# Patient Record
Sex: MALE | Race: WHITE | NOT HISPANIC OR LATINO | Employment: OTHER | URBAN - METROPOLITAN AREA
[De-identification: names, ages, dates, MRNs, and addresses within clinical notes are randomized per-mention and may not be internally consistent; named-entity substitution may affect disease eponyms.]

---

## 2017-01-03 ENCOUNTER — APPOINTMENT (OUTPATIENT)
Dept: PHYSICAL THERAPY | Age: 57
End: 2017-01-03
Payer: COMMERCIAL

## 2017-01-03 PROCEDURE — 97110 THERAPEUTIC EXERCISES: CPT

## 2017-01-03 PROCEDURE — 97140 MANUAL THERAPY 1/> REGIONS: CPT

## 2017-01-05 ENCOUNTER — APPOINTMENT (OUTPATIENT)
Dept: PHYSICAL THERAPY | Age: 57
End: 2017-01-05
Payer: COMMERCIAL

## 2017-01-06 ENCOUNTER — APPOINTMENT (OUTPATIENT)
Dept: PHYSICAL THERAPY | Age: 57
End: 2017-01-06
Payer: COMMERCIAL

## 2017-01-06 PROCEDURE — 97140 MANUAL THERAPY 1/> REGIONS: CPT

## 2017-01-06 PROCEDURE — 97110 THERAPEUTIC EXERCISES: CPT

## 2017-01-09 ENCOUNTER — APPOINTMENT (OUTPATIENT)
Dept: PHYSICAL THERAPY | Age: 57
End: 2017-01-09
Payer: COMMERCIAL

## 2017-01-10 ENCOUNTER — APPOINTMENT (OUTPATIENT)
Dept: PHYSICAL THERAPY | Age: 57
End: 2017-01-10
Payer: COMMERCIAL

## 2017-01-10 PROCEDURE — 97140 MANUAL THERAPY 1/> REGIONS: CPT

## 2017-01-10 PROCEDURE — 97110 THERAPEUTIC EXERCISES: CPT

## 2017-01-11 ENCOUNTER — APPOINTMENT (OUTPATIENT)
Dept: PHYSICAL THERAPY | Age: 57
End: 2017-01-11
Payer: COMMERCIAL

## 2017-01-13 ENCOUNTER — APPOINTMENT (OUTPATIENT)
Dept: PHYSICAL THERAPY | Age: 57
End: 2017-01-13
Payer: COMMERCIAL

## 2017-01-13 PROCEDURE — 97110 THERAPEUTIC EXERCISES: CPT

## 2017-01-13 PROCEDURE — 97140 MANUAL THERAPY 1/> REGIONS: CPT

## 2017-01-16 ENCOUNTER — APPOINTMENT (OUTPATIENT)
Dept: PHYSICAL THERAPY | Age: 57
End: 2017-01-16
Payer: COMMERCIAL

## 2017-01-17 ENCOUNTER — APPOINTMENT (OUTPATIENT)
Dept: PHYSICAL THERAPY | Age: 57
End: 2017-01-17
Payer: COMMERCIAL

## 2017-01-17 PROCEDURE — 97110 THERAPEUTIC EXERCISES: CPT

## 2017-01-17 PROCEDURE — 97140 MANUAL THERAPY 1/> REGIONS: CPT

## 2017-01-18 ENCOUNTER — APPOINTMENT (OUTPATIENT)
Dept: PHYSICAL THERAPY | Age: 57
End: 2017-01-18
Payer: COMMERCIAL

## 2017-01-20 ENCOUNTER — APPOINTMENT (OUTPATIENT)
Dept: PHYSICAL THERAPY | Age: 57
End: 2017-01-20
Payer: COMMERCIAL

## 2017-01-20 PROCEDURE — 97110 THERAPEUTIC EXERCISES: CPT

## 2017-01-20 PROCEDURE — 97140 MANUAL THERAPY 1/> REGIONS: CPT

## 2017-01-24 ENCOUNTER — GENERIC CONVERSION - ENCOUNTER (OUTPATIENT)
Dept: OTHER | Facility: OTHER | Age: 57
End: 2017-01-24

## 2017-01-24 ENCOUNTER — APPOINTMENT (OUTPATIENT)
Dept: PHYSICAL THERAPY | Age: 57
End: 2017-01-24
Payer: COMMERCIAL

## 2017-01-24 PROCEDURE — 97140 MANUAL THERAPY 1/> REGIONS: CPT

## 2017-01-24 PROCEDURE — 97110 THERAPEUTIC EXERCISES: CPT

## 2017-01-27 ENCOUNTER — APPOINTMENT (OUTPATIENT)
Dept: PHYSICAL THERAPY | Age: 57
End: 2017-01-27
Payer: COMMERCIAL

## 2017-01-27 PROCEDURE — 97110 THERAPEUTIC EXERCISES: CPT

## 2017-01-27 PROCEDURE — 97140 MANUAL THERAPY 1/> REGIONS: CPT

## 2017-01-31 ENCOUNTER — APPOINTMENT (OUTPATIENT)
Dept: PHYSICAL THERAPY | Age: 57
End: 2017-01-31
Payer: COMMERCIAL

## 2017-01-31 ENCOUNTER — ALLSCRIPTS OFFICE VISIT (OUTPATIENT)
Dept: OTHER | Facility: OTHER | Age: 57
End: 2017-01-31

## 2017-01-31 PROCEDURE — 97140 MANUAL THERAPY 1/> REGIONS: CPT

## 2017-01-31 PROCEDURE — 97110 THERAPEUTIC EXERCISES: CPT

## 2017-02-03 ENCOUNTER — APPOINTMENT (OUTPATIENT)
Dept: PHYSICAL THERAPY | Age: 57
End: 2017-02-03
Payer: COMMERCIAL

## 2017-02-03 PROCEDURE — 97110 THERAPEUTIC EXERCISES: CPT

## 2017-02-03 PROCEDURE — 97140 MANUAL THERAPY 1/> REGIONS: CPT

## 2017-02-06 ENCOUNTER — APPOINTMENT (OUTPATIENT)
Dept: PHYSICAL THERAPY | Age: 57
End: 2017-02-06
Payer: COMMERCIAL

## 2017-02-07 ENCOUNTER — APPOINTMENT (OUTPATIENT)
Dept: PHYSICAL THERAPY | Age: 57
End: 2017-02-07
Payer: COMMERCIAL

## 2017-02-10 ENCOUNTER — APPOINTMENT (OUTPATIENT)
Dept: PHYSICAL THERAPY | Age: 57
End: 2017-02-10
Payer: COMMERCIAL

## 2017-02-10 PROCEDURE — 97140 MANUAL THERAPY 1/> REGIONS: CPT

## 2017-02-10 PROCEDURE — 97110 THERAPEUTIC EXERCISES: CPT

## 2017-02-14 ENCOUNTER — APPOINTMENT (OUTPATIENT)
Dept: PHYSICAL THERAPY | Age: 57
End: 2017-02-14
Payer: COMMERCIAL

## 2017-02-16 ENCOUNTER — GENERIC CONVERSION - ENCOUNTER (OUTPATIENT)
Dept: OTHER | Facility: OTHER | Age: 57
End: 2017-02-16

## 2017-02-17 ENCOUNTER — APPOINTMENT (OUTPATIENT)
Dept: PHYSICAL THERAPY | Age: 57
End: 2017-02-17
Payer: COMMERCIAL

## 2017-02-17 PROCEDURE — 97140 MANUAL THERAPY 1/> REGIONS: CPT

## 2017-02-17 PROCEDURE — 97110 THERAPEUTIC EXERCISES: CPT

## 2017-02-21 ENCOUNTER — APPOINTMENT (OUTPATIENT)
Dept: PHYSICAL THERAPY | Age: 57
End: 2017-02-21
Payer: COMMERCIAL

## 2017-02-21 PROCEDURE — 97110 THERAPEUTIC EXERCISES: CPT

## 2017-02-21 PROCEDURE — 97140 MANUAL THERAPY 1/> REGIONS: CPT

## 2017-02-24 ENCOUNTER — APPOINTMENT (OUTPATIENT)
Dept: PHYSICAL THERAPY | Age: 57
End: 2017-02-24
Payer: COMMERCIAL

## 2017-02-24 PROCEDURE — 97140 MANUAL THERAPY 1/> REGIONS: CPT

## 2017-02-24 PROCEDURE — 97110 THERAPEUTIC EXERCISES: CPT

## 2017-02-27 ENCOUNTER — APPOINTMENT (OUTPATIENT)
Dept: PHYSICAL THERAPY | Age: 57
End: 2017-02-27
Payer: COMMERCIAL

## 2017-03-01 ENCOUNTER — GENERIC CONVERSION - ENCOUNTER (OUTPATIENT)
Dept: OTHER | Facility: OTHER | Age: 57
End: 2017-03-01

## 2017-03-01 ENCOUNTER — APPOINTMENT (OUTPATIENT)
Dept: PHYSICAL THERAPY | Age: 57
End: 2017-03-01
Payer: COMMERCIAL

## 2017-03-01 PROCEDURE — 97140 MANUAL THERAPY 1/> REGIONS: CPT

## 2017-03-01 PROCEDURE — 97110 THERAPEUTIC EXERCISES: CPT

## 2017-03-15 ENCOUNTER — HOSPITAL ENCOUNTER (OUTPATIENT)
Dept: RADIOLOGY | Facility: HOSPITAL | Age: 57
Discharge: HOME | End: 2017-03-15
Attending: ORTHOPAEDIC SURGERY
Payer: COMMERCIAL

## 2017-03-15 ENCOUNTER — ALLSCRIPTS OFFICE VISIT (OUTPATIENT)
Dept: OTHER | Facility: OTHER | Age: 57
End: 2017-03-15

## 2017-03-15 DIAGNOSIS — Z47.1 AFTERCARE FOLLOWING JOINT REPLACEMENT SURGERY: ICD-10-CM

## 2017-03-15 PROCEDURE — 73560 X-RAY EXAM OF KNEE 1 OR 2: CPT

## 2017-10-26 ENCOUNTER — HOSPITAL ENCOUNTER (EMERGENCY)
Facility: HOSPITAL | Age: 57
Discharge: HOME | End: 2017-10-26
Attending: EMERGENCY MEDICINE
Payer: COMMERCIAL

## 2017-10-26 ENCOUNTER — APPOINTMENT (EMERGENCY)
Dept: RADIOLOGY | Facility: HOSPITAL | Age: 57
End: 2017-10-26
Payer: COMMERCIAL

## 2017-10-26 VITALS
OXYGEN SATURATION: 98 % | WEIGHT: 249.3 LBS | SYSTOLIC BLOOD PRESSURE: 143 MMHG | HEART RATE: 69 BPM | DIASTOLIC BLOOD PRESSURE: 74 MMHG | BODY MASS INDEX: 33.81 KG/M2 | TEMPERATURE: 97.7 F | RESPIRATION RATE: 16 BRPM

## 2017-10-26 DIAGNOSIS — K21.9 GERD (GASTROESOPHAGEAL REFLUX DISEASE): Primary | ICD-10-CM

## 2017-10-26 LAB
ANION GAP SERPL CALCULATED.3IONS-SCNC: 10 MMOL/L (ref 4–13)
APTT PPP: 25 SECONDS (ref 23–35)
ATRIAL RATE: 66 BPM
BASOPHILS # BLD AUTO: 0.08 THOUSANDS/ΜL (ref 0–0.1)
BASOPHILS NFR BLD AUTO: 1 % (ref 0–1)
BUN SERPL-MCNC: 18 MG/DL (ref 5–25)
CALCIUM SERPL-MCNC: 8.8 MG/DL (ref 8.3–10.1)
CHLORIDE SERPL-SCNC: 102 MMOL/L (ref 100–108)
CO2 SERPL-SCNC: 26 MMOL/L (ref 21–32)
CREAT SERPL-MCNC: 0.83 MG/DL (ref 0.6–1.3)
EOSINOPHIL # BLD AUTO: 0.16 THOUSAND/ΜL (ref 0–0.61)
EOSINOPHIL NFR BLD AUTO: 2 % (ref 0–6)
ERYTHROCYTE [DISTWIDTH] IN BLOOD BY AUTOMATED COUNT: 13.2 % (ref 11.6–15.1)
GFR SERPL CREATININE-BSD FRML MDRD: 98 ML/MIN/1.73SQ M
GLUCOSE SERPL-MCNC: 118 MG/DL (ref 65–140)
HCT VFR BLD AUTO: 43.4 % (ref 36.5–49.3)
HGB BLD-MCNC: 15.4 G/DL (ref 12–17)
INR PPP: 0.9 (ref 0.86–1.16)
LYMPHOCYTES # BLD AUTO: 1.55 THOUSANDS/ΜL (ref 0.6–4.47)
LYMPHOCYTES NFR BLD AUTO: 21 % (ref 14–44)
MCH RBC QN AUTO: 31.3 PG (ref 26.8–34.3)
MCHC RBC AUTO-ENTMCNC: 35.5 G/DL (ref 31.4–37.4)
MCV RBC AUTO: 88 FL (ref 82–98)
MONOCYTES # BLD AUTO: 0.77 THOUSAND/ΜL (ref 0.17–1.22)
MONOCYTES NFR BLD AUTO: 10 % (ref 4–12)
NEUTROPHILS # BLD AUTO: 4.96 THOUSANDS/ΜL (ref 1.85–7.62)
NEUTS SEG NFR BLD AUTO: 66 % (ref 43–75)
P AXIS: 64 DEGREES
PLATELET # BLD AUTO: 214 THOUSANDS/UL (ref 149–390)
PMV BLD AUTO: 10.8 FL (ref 8.9–12.7)
POTASSIUM SERPL-SCNC: 3.5 MMOL/L (ref 3.5–5.3)
PR INTERVAL: 174 MS
PROTHROMBIN TIME: 12.4 SECONDS (ref 12.1–14.4)
QRS AXIS: 18 DEGREES
QRSD INTERVAL: 96 MS
QT INTERVAL: 402 MS
QTC INTERVAL: 421 MS
RBC # BLD AUTO: 4.92 MILLION/UL (ref 3.88–5.62)
SODIUM SERPL-SCNC: 138 MMOL/L (ref 136–145)
T WAVE AXIS: 57 DEGREES
TROPONIN I SERPL-MCNC: <0.02 NG/ML
VENTRICULAR RATE: 66 BPM
WBC # BLD AUTO: 7.52 THOUSAND/UL (ref 4.31–10.16)

## 2017-10-26 PROCEDURE — 85610 PROTHROMBIN TIME: CPT | Performed by: EMERGENCY MEDICINE

## 2017-10-26 PROCEDURE — 99285 EMERGENCY DEPT VISIT HI MDM: CPT

## 2017-10-26 PROCEDURE — 80048 BASIC METABOLIC PNL TOTAL CA: CPT | Performed by: EMERGENCY MEDICINE

## 2017-10-26 PROCEDURE — 71020 HB CHEST X-RAY 2VW FRONTAL&LATL: CPT

## 2017-10-26 PROCEDURE — 84484 ASSAY OF TROPONIN QUANT: CPT | Performed by: EMERGENCY MEDICINE

## 2017-10-26 PROCEDURE — 85730 THROMBOPLASTIN TIME PARTIAL: CPT | Performed by: EMERGENCY MEDICINE

## 2017-10-26 PROCEDURE — 85025 COMPLETE CBC W/AUTO DIFF WBC: CPT | Performed by: EMERGENCY MEDICINE

## 2017-10-26 PROCEDURE — 93005 ELECTROCARDIOGRAM TRACING: CPT | Performed by: EMERGENCY MEDICINE

## 2017-10-26 PROCEDURE — 93005 ELECTROCARDIOGRAM TRACING: CPT

## 2017-10-26 PROCEDURE — 36415 COLL VENOUS BLD VENIPUNCTURE: CPT | Performed by: EMERGENCY MEDICINE

## 2017-10-26 RX ORDER — MAGNESIUM HYDROXIDE/ALUMINUM HYDROXICE/SIMETHICONE 120; 1200; 1200 MG/30ML; MG/30ML; MG/30ML
30 SUSPENSION ORAL ONCE
Status: COMPLETED | OUTPATIENT
Start: 2017-10-26 | End: 2017-10-26

## 2017-10-26 RX ORDER — INDOMETHACIN 25 MG/1
25 CAPSULE ORAL AS NEEDED
COMMUNITY
Start: 2014-04-23 | End: 2019-10-03

## 2017-10-26 RX ADMIN — LIDOCAINE HYDROCHLORIDE 15 ML: 20 SOLUTION ORAL; TOPICAL at 10:52

## 2017-10-26 RX ADMIN — ALUMINUM HYDROXIDE, MAGNESIUM HYDROXIDE, AND SIMETHICONE 30 ML: 200; 200; 20 SUSPENSION ORAL at 10:52

## 2017-10-26 NOTE — ED PROVIDER NOTES
History  Chief Complaint   Patient presents with    Chest Pain     intermittent CP since monday evening, described as burning; does not radiate, not reproducible; not dizzy, no SOB; no other symptoms noted @this time     Patient referred to the emergency department by his primary care physician for evaluation of chest discomfort that he describes as a burning sensation consistent with his acid reflux for which she takes Prilosec  He states he has had this discomfort for the past 3 or 4 days  He denies sob jaw arm or neck pain  He denies back or belly pain  He denies fever chills cough nausea vomiting diarrhea  He denies rectal bleeding or melena  Prior to Admission Medications   Prescriptions Last Dose Informant Patient Reported? Taking? Multiple Vitamin (MULTIVITAMIN) capsule   Yes Yes   Sig: Take 1 capsule by mouth daily  acetaminophen (TYLENOL) 325 mg tablet   No Yes   Sig: Take 2 tablets by mouth every 6 (six) hours as needed for mild pain   amLODIPine (NORVASC) 5 mg tablet   Yes Yes   Sig: Take 5 mg by mouth daily  ascorbic acid (VITAMIN C) 500 mg tablet   Yes Yes   Sig: Take 500 mg by mouth 2 (two) times a day  b complex vitamins capsule   Yes Yes   Sig: Take 1 capsule by mouth daily  folic acid (FOLVITE) 1 mg tablet   Yes Yes   Sig: Take 1 mg by mouth daily  gabapentin (NEURONTIN) 100 mg capsule   Yes Yes   Sig: Take 100 mg by mouth daily in the early morning   indomethacin (INDOCIN) 25 mg capsule   Yes Yes   Sig: Take 25 mg by mouth as needed   omeprazole (PriLOSEC) 20 mg delayed release capsule   Yes Yes   Sig: Take 20 mg by mouth daily   pantoprazole (PROTONIX) 40 mg tablet   No No   Sig: Take 1 tablet by mouth daily in the early morning for 30 days   triamterene-hydrochlorothiazide (DYAZIDE) 37 5-25 mg per capsule   Yes Yes   Sig: Take 1 capsule by mouth every morning        Facility-Administered Medications: None       Past Medical History:   Diagnosis Date    Anxiety     Arthritis     Chronic GERD     Hypertension     Sleep apnea        Past Surgical History:   Procedure Laterality Date    BACK SURGERY      ELBOW SURGERY      JOINT REPLACEMENT      KNEE ARTHROSCOPY Left     [place] TOTAL HIP ARTHROPLASTY Right 1/12/2016    Procedure: ARTHROPLASTY HIP TOTAL ANTERIOR;  Surgeon: Lillie Benoit DO;  [facility]: [facility];  Service: Orthopedics    [place] TOTAL KNEE ARTHROPLASTY Left 12/12/2016    Procedure: ARTHROPLASTY KNEE TOTAL;  Surgeon: Darryl Graff MD;  Location: [facility];  Service: Orthopedics       Family History   Problem Relation Age of Onset    Hypertension Mother      I have reviewed and agree with the history as documented  Social History   Substance Use Topics    Smoking status: Light Tobacco Smoker    Smokeless tobacco: Never Used      Comment: social smoker    Alcohol use Yes      Comment: occasional        Review of Systems   Constitutional: Negative  Negative for activity change, appetite change, chills, diaphoresis, fatigue and fever  HENT: Negative  Negative for congestion, ear pain, rhinorrhea, sore throat, trouble swallowing and voice change  Eyes: Negative for photophobia and visual disturbance  Respiratory: Negative  Negative for cough, chest tightness, shortness of breath, wheezing and stridor  Cardiovascular: Positive for chest pain  Negative for palpitations and leg swelling  Gastrointestinal: Negative  Negative for abdominal pain, diarrhea, nausea and vomiting  Endocrine: Negative  Genitourinary: Negative  Negative for dysuria, hematuria and urgency  Musculoskeletal: Negative  Negative for back pain, neck pain and neck stiffness  Skin: Negative  Negative for rash and wound  Allergic/Immunologic: Negative  Neurological: Negative  Negative for dizziness, tremors, seizures, syncope, facial asymmetry, speech difficulty, weakness, light-headedness, numbness and headaches  Hematological: Negative    Does not bruise/bleed easily  Psychiatric/Behavioral: Negative  Physical Exam  ED Triage Vitals   Temperature Pulse Respirations Blood Pressure SpO2   10/26/17 0942 10/26/17 0930 10/26/17 1139 10/26/17 0930 10/26/17 0930   97 7 °F (36 5 °C) 69 16 163/85 96 %      Temp Source Heart Rate Source Patient Position - Orthostatic VS BP Location FiO2 (%)   10/26/17 0942 10/26/17 0930 10/26/17 0930 10/26/17 0930 --   Oral Monitor Lying Right arm       Pain Score       10/26/17 1139       No Pain           Orthostatic Vital Signs  Vitals:    10/26/17 0930 10/26/17 1130 10/26/17 1139   BP: 163/85 143/82 143/74   Pulse: 69 64 69   Patient Position - Orthostatic VS: Lying         Physical Exam   Constitutional: He is oriented to person, place, and time  He appears well-developed and well-nourished  HENT:   Head: Normocephalic and atraumatic  Right Ear: External ear normal    Left Ear: External ear normal    Mouth/Throat: Oropharynx is clear and moist    Eyes: Conjunctivae and EOM are normal  Pupils are equal, round, and reactive to light  Neck: Normal range of motion  Neck supple  Cardiovascular: Normal rate, regular rhythm, normal heart sounds and intact distal pulses  Pulmonary/Chest: Effort normal and breath sounds normal  No respiratory distress  He has no wheezes  He has no rales  He exhibits no tenderness  Abdominal: Soft  Bowel sounds are normal  He exhibits no distension and no mass  There is no tenderness  There is no rebound and no guarding  No hernia  No peritoneal signs   Musculoskeletal: Normal range of motion  He exhibits no edema, tenderness or deformity  Neurological: He is alert and oriented to person, place, and time  He has normal reflexes  He displays normal reflexes  No cranial nerve deficit or sensory deficit  He exhibits normal muscle tone  Coordination normal    Skin: Skin is warm and dry  No rash noted  No erythema  No pallor  Psychiatric: He has a normal mood and affect   His behavior is normal  Judgment and thought content normal    Nursing note and vitals reviewed  ED Medications  Medications   aluminum-magnesium hydroxide-simethicone (MYLANTA) 200-200-20 mg/5 mL oral suspension 30 mL (30 mL Oral Given 10/26/17 1052)   lidocaine viscous (XYLOCAINE) 2 % mucosal solution 15 mL (15 mL Swish & Spit Given 10/26/17 1052)       Diagnostic Studies  Results Reviewed     Procedure Component Value Units Date/Time    Troponin I [[id number]]  (Normal) Collected:  10/26/17 1034    Lab Status:  Final result Specimen:  Blood from Arm, Left Updated:  10/26/17 1106     Troponin I <0 02 ng/mL     Narrative:         [employer] Chemistry analyzer 99% cutoff is > 0 04 ng/mL in network labs    o cTnI 99% cutoff is useful only when applied to patients in the clinical setting of myocardial ischemia  o cTnI 99% cutoff should be interpreted in the context of clinical history, ECG findings and possibly cardiac imaging to establish correct diagnosis  o cTnI 99% cutoff may be suggestive but clearly not indicative of a coronary event without the clinical setting of myocardial ischemia  Protime-INR [[id number]]  (Normal) Collected:  10/26/17 0954    Lab Status:  Final result Specimen:  Blood from Arm, Left Updated:  10/26/17 1013     Protime 12 4 seconds      INR 0 90    APTT [[id number]]  (Normal) Collected:  10/26/17 0954    Lab Status:  Final result Specimen:  Blood from Arm, Left Updated:  10/26/17 1013     PTT 25 seconds     Narrative:          Therapeutic Heparin Range = 60-90 seconds    Basic metabolic panel [[id number]] Collected:  10/26/17 0954    Lab Status:  Final result Specimen:  Blood from Arm, Left Updated:  10/26/17 1011     Sodium 138 mmol/L      Potassium 3 5 mmol/L      Chloride 102 mmol/L      CO2 26 mmol/L      Anion Gap 10 mmol/L      BUN 18 mg/dL      Creatinine 0 83 mg/dL      Glucose 118 mg/dL      Calcium 8 8 mg/dL      eGFR 98 ml/min/1 73sq m     Narrative:         National Kidney Disease Education Program recommendations are as follows:  GFR calculation is accurate only with a steady state creatinine  Chronic Kidney disease less than 60 ml/min/1 73 sq  meters  Kidney failure less than 15 ml/min/1 73 sq  meters  CBC and differential [[id number]]  (Normal) Collected:  10/26/17 0954    Lab Status:  Final result Specimen:  Blood from Arm, Left Updated:  10/26/17 1002     WBC 7 52 Thousand/uL      RBC 4 92 Million/uL      Hemoglobin 15 4 g/dL      Hematocrit 43 4 %      MCV 88 fL      MCH 31 3 pg      MCHC 35 5 g/dL      RDW 13 2 %      MPV 10 8 fL      Platelets 510 Thousands/uL      Neutrophils Relative 66 %      Lymphocytes Relative 21 %      Monocytes Relative 10 %      Eosinophils Relative 2 %      Basophils Relative 1 %      Neutrophils Absolute 4 96 Thousands/µL      Lymphocytes Absolute 1 55 Thousands/µL      Monocytes Absolute 0 77 Thousand/µL      Eosinophils Absolute 0 16 Thousand/µL      Basophils Absolute 0 08 Thousands/µL                  XR chest 2 views   Final Result by Tyler Tamez DO (10/26 1002)      No active pulmonary disease  Workstation performed: [id number]                    Procedures  ECG 12 Lead Documentation  Date/Time: 10/26/2017 9:56 AM  Performed by: Tim Trivedi  Authorized by: Tim Trivedi     ECG reviewed by me, the ED Provider: yes    Patient location:  ED  Previous ECG:     Previous ECG:  Compared to current    Similarity:  No change  Interpretation:     Interpretation: abnormal    Rate:     ECG rate:  66    ECG rate assessment: normal    Rhythm:     Rhythm: sinus rhythm    Ectopy:     Ectopy: none    QRS:     QRS axis:  Normal    QRS intervals:  Normal  Conduction:     Conduction: normal    ST segments:     ST segments:  Non-specific  T waves:     T waves: non-specific               Phone Contacts  ED Phone Contact    ED Course  ED Course as of Oct 26 1147   u Oct 26, 2017   1146 Patient with goodResponse to GI cocktail    Suspect this to be a reflux presentation  Advised patient to continue Prilosec and at times Maalox Mylanta to his regimen  Discussed signs and symptoms that would require return to the emergency department  MDM  CritCare Time    Disposition  Final diagnoses:   GERD (gastroesophageal reflux disease)     Time reflects when diagnosis was documented in both MDM as applicable and the Disposition within this note     Time User Action Codes Description Comment    10/26/2017 11:47 AM Rodrigo Green 56 [Y92 39] Injury occurred in gymnasium     10/26/2017 11:47 AM Arvind Green 70 [Y92 39] Injury occurred in gymnasium     10/26/2017 11:47 AM Oneyda Green Pro Add [K21 9] GERD (gastroesophageal reflux disease)       ED Disposition     ED Disposition Condition Comment    Discharge  Pleas Dates discharge to home/self care  Condition at discharge: Stable        Follow-up Information     Follow up With Specialties Details Why [facility] physician  Schedule an appointment as soon as possible for a visit          Patient's Medications   Discharge Prescriptions    No medications on file     No discharge procedures on file      ED Provider  Electronically Signed by           Andrea Tolbert MD  10/26/17 2524

## 2017-10-26 NOTE — DISCHARGE INSTRUCTIONS
Gastroesophageal Reflux Disease   WHAT YOU NEED TO KNOW:   What is gastroesophageal reflux disease? Gastroesophageal reflux occurs when acid and food in the stomach back up into the esophagus  Gastroesophageal reflux disease (GERD) is reflux that occurs more than twice a week for a few weeks  It usually causes heartburn and other symptoms  GERD can cause other health problems over time if it is not treated  What causes GERD? GERD often occurs when the lower muscle (sphincter) of the esophagus does not close properly  The sphincter normally opens to let food into the stomach  It then closes to keep food and stomach acid in the stomach  If the sphincter does not close properly, stomach acid and food back up (reflux) into the esophagus  The following may increase your risk for GERD:  · Certain foods such as spicy foods, chocolate, foods that contain caffeine, peppermint, and fried foods    · Hiatal hernia    · Certain medicines such as calcium channel blockers (used to treat high blood pressure), allergy medicines, sedatives, or antidepressants    · Pregnancy or obesity    · Lying down after a meal    · Drinking alcohol or smoking  What are the signs and symptoms of GERD? Heartburn is the most common symptom of GERD  You may feel burning pain in your chest or below the breast bone  This usually occurs after meals and spreads to your neck, jaw, or shoulder  The pain gets better when you change positions  You may also have any of the following:  · Bitter or acid taste in your mouth    · Dry cough    · Trouble swallowing or pain with swallowing    · Hoarseness or sore throat    · Frequent burping or hiccups    · Feeling of fullness soon after you start eating  How is GERD diagnosed? Your healthcare provider will ask about your symptoms and when they started  Tell him or her about other medical conditions you have, your eating habits, and your activities   You may also need any of the following:  · Esophageal pH monitoring  is used to place a small probe inside your esophagus and stomach to check the amount of acid  · An endoscopy  is a procedure used to look at the inside of your esophagus and stomach  An endoscope is a bendable tube with a light and camera on the end  Your healthcare provider may remove a small sample of tissue and send it to a lab for tests  · Upper GI x-rays  are done to take pictures of your stomach and intestines (bowel)  You may be given a chalky liquid to drink before the pictures are taken  This liquid helps your stomach and intestines show up better on the x-rays  · Esophageal manometry  is a test that shows how your esophagus pushes food and fluid to your stomach  It also shows the pressures in your esophagus and stomach  It may show a hiatal hernia  How is GERD treated? · Medicines  are used to decrease stomach acid  Medicine may also be used to help your lower esophageal sphincter and stomach contract (tighten) more  · Surgery  is done to wrap the upper part of the stomach around the esophageal sphincter  This will strengthen the sphincter and prevent reflux  How can I help manage GERD? · Do not have foods or drinks that may increase heartburn  These include chocolate, peppermint, fried or fatty foods, drinks that contain caffeine, or carbonated drinks (soda)  Other foods include spicy foods, onions, tomatoes, and tomato-based foods  Do not have foods or drinks that can irritate your esophagus, such as citrus fruits, juices, and alcohol  · Do not eat large meals  When you eat a lot of food at one time, your stomach needs more acid to digest it  Eat 6 small meals each day instead of 3 large ones, and eat slowly  Do not eat meals 2 to 3 hours before bedtime  · Elevate the head of your bed  Place 6-inch blocks under the head of your bed frame  You may also use more than one pillow under your head and shoulders while you sleep  · Maintain a healthy weight    If you are overweight, weight loss may help relieve symptoms of GERD  · Do not smoke  Smoking weakens the lower esophageal sphincter and increases the risk of GERD  Ask your healthcare provider for information if you currently smoke and need help to quit  E-cigarettes or smokeless tobacco still contain nicotine  Talk to your healthcare provider before you use these products  · Do not wear clothing that is tight around your waist   Tight clothing can put pressure on your stomach and cause or worsen GERD symptoms  When should I seek immediate care? · You feel full and cannot burp or vomit  · You have severe chest pain and sudden trouble breathing  · Your bowel movements are black, bloody, or tarry-looking  · Your vomit looks like coffee grounds or has blood in it  When should I contact my healthcare provider? · You vomit large amounts, or you vomit often  · You have trouble breathing after you vomit  · You have trouble swallowing, or pain with swallowing  · You are losing weight without trying  · Your symptoms get worse or do not improve with treatment  · You have questions or concerns about your condition or care  CARE AGREEMENT:   You have the right to help plan your care  Learn about your health condition and how it may be treated  Discuss treatment options with your caregivers to decide what care you want to receive  You always have the right to refuse treatment  The above information is an  only  It is not intended as medical advice for individual conditions or treatments  Talk to your doctor, nurse or pharmacist before following any medical regimen to see if it is safe and effective for you  © 2017 [employer] Information is for End User's use only and may not be sold, redistributed or otherwise used for commercial purposes   All illustrations and images included in CareNotes® are the copyrighted property of [employer] M [employer]  or [employer]

## 2018-01-10 NOTE — PROGRESS NOTES
Assessment    1  Aftercare following surgery of the musculoskeletal system (V58 78) (Z64 48)    Plan  Aftercare following surgery of the musculoskeletal system    · Follow-up visit in 5 weeks Evaluation and Treatment  Follow-up  Status: Complete  Done:  [id number]    Discussion/Summary    Patient seen by Dr Abbott Friday as well as myself today  The area looks well  We'll see him back in 4-5 weeks for repeat exam, no need for x-rays at that time  Chief Complaint    1  Hip Pain    Post-Op  HPI: Hanh Burrows presents to the office today for his second postoperative visit after undergoing a right anterior total hip arthroplasty  He is here for staple removal  He reports that his pain symptoms are improving  He is now walking with a walker and attending outpatient therapy  Review of Systems    Constitutional: No fever or chills, feels well, no tiredness, no recent weight loss or weight gain  Eyes: No complaints of red eyes, no eyesight problems  ENT: no complaints of loss of hearing, no nosebleeds, no sore throat  Cardiovascular: No complaints of chest pain, no palpitations, no leg claudication or lower extremity edema  Respiratory: No complaints of shortness of breath, no wheezing, no cough  Gastrointestinal: No complaints of abdominal pain, no constipation, no nausea or vomiting, no diarrhea or bloody stools  Genitourinary: No complaints of dysuria or incontinence, no hesitancy, no nocturia  Musculoskeletal: as noted in HPI  Integumentary: No complaints of skin rash or lesion, no itching or dry skin, no skin wounds  Neurological: No complaints of headache, no confusion, no numbness or tingling, no dizziness  Psychiatric: No suicidal thoughts, no anxiety, no depression  Endocrine: No muscle weakness, no frequent urination, no excessive thirst, no feelings of weakness  ROS reviewed  Active Problems    1  Achilles tendinitis of left lower extremity (219 36) (T73 58)   2   Aftercare following surgery of the musculoskeletal system (V58 78) (Z47 89)   3  Arthralgia Of The Left Pelvis/Hip/Femur (719 45)   4  Foraminal stenosis of lumbar region (724 02) (M99 83)   5  Foraminal stenosis of lumbosacral region (724 02) (M99 83)   6  Greater trochanteric bursitis of right hip (726 5) (M70 61)   7  Hip arthritis (716 95) (M19 90)   8  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)   9  Limb pain (729 5) (M79 609)   10  Lumbar disc herniation with radiculopathy (722 10) (M51 16)   11  Lumbar spondylosis (721 3) (M47 816)   12  Primary osteoarthritis of left knee (715 16) (M17 12)   13  SI (sacroiliac) joint dysfunction (724 6) (M53 3)    Social History    · Never A Smoker  The social history was reviewed and updated today  Current Meds   1  ALPRAZolam 0 25 MG Oral Tablet; Therapy: 69FNB7577 to (Evaluate:22Nov2014) Recorded   2  Clotrimazole-Betamethasone 1-0 05 % External Cream;   Therapy: 92YND1765 to (Evaluate:13Nov2014) Recorded   3  Cyclobenzaprine HCl - 10 MG Oral Tablet; Therapy: 96SDU1555 to (Evaluate:21Nov2014) Recorded   4  Gabapentin 100 MG Oral Capsule; TAKE 1 CAPSULE 3 TIMES DAILY; Therapy: 74VSQ7017 to ([id number])  Requested for: 04FSG1539; Last   Rx:03Nov2015 Ordered   5  Gabapentin 300 MG Oral Capsule; TAKE 1 CAPSULE 3 TIMES DAILY; Therapy: 04QXM6460 to (Evaluate:18Jan2014); Last Rx:98Hiy2339 Ordered   6  Indomethacin 25 MG Oral Capsule; take 1 capsule 2 to 3 times daily with food; Therapy: 62CJA9509 to (Evaluate:18Feb2016)  Requested for: 28DUH9406; Last   Rx:19Jan2016 Ordered   7  Levofloxacin 500 MG Oral Tablet; Therapy: 39RCQ6242 to (Evaluate:53Ktd6007) Recorded   8  Meloxicam 15 MG Oral Tablet; TAKE 1 TABLET DAILY WITH FOOD; Therapy: 27RYO7031 to (Tess Laird)  Requested for: 92Xel2380; Last   Rx:36Tjs4303 Ordered   9  Meloxicam 15 MG Oral Tablet; Take 1 tablet daily; Therapy: 58ROP8028 to (Evaluate:19Jan2016)  Requested for: 05WPG2389; Last   Rx:21Oct2015 Ordered   10  Methocarbamol 500 MG Oral Tablet; TAKE 1 TABLET Every 8 hours PRN muscle    spasms; Therapy: 24PTK8892 to (Jennifer Swain)  Requested for: 02ENK6418; Last    Rx:10Eks4528 Ordered   11  Naproxen Sodium 550 MG Oral Tablet; Therapy: 11MTM8914 to (Luis Bathrupal) Recorded   12  Norvasc TABS Recorded   13  Omeprazole 40 MG Oral Capsule Delayed Release; TAKE 1 CAPSULE DAILY; Therapy: 44NZP0805 to (Evaluate:27Pza1076)  Requested for: 81XPV5811; Last    Rx:03Nov2015 Ordered   14  Penicillin V Potassium 500 MG Oral Tablet; Therapy: 16YYV1180 to (Evaluate:07Nov2014) Recorded   15  PredniSONE 10 MG Oral Tablet; Therapy: 45YBG5316 to (Evaluate:16Nov2014) Recorded   16  Tobramycin-Dexamethasone 0 3-0 1 % Ophthalmic Suspension; Therapy: 08QOD0958 to (Evaluate:21Nov2014) Recorded   17  TraMADol HCl - 50 MG Oral Tablet; TAKE 1 TABLET EVERY 6 HOURS AS NEEDED FOR    PAIN;    Therapy: 63HMN7459 to (Evaluate:16Nov2014); Last Rx:06Nov2014 Ordered   18  TraMADol HCl - 50 MG Oral Tablet; TAKE 1 TO 2 TABLETS EVERY 6 HOURS AS    NEEDED FOR PAIN;    Therapy: 83SMN1559 to (Evaluate:03Txx6284)  Requested for: 46MGD5795; Last    Rx:97Scb5184 Ordered   19  TraMADol HCl - 50 MG Oral Tablet; TAKE 1 TO 2 TABLETS EVERY 6 HOURS AS    NEEDED FOR PAIN;    Therapy: 42FZQ9759 to (Evaluate:21Oct2015)  Requested for: 96GHZ2423; Last    Rx:09Oct2015 Ordered   20  TraMADol HCl - 50 MG Oral Tablet; TAKE 1 TO 2 TABLETS EVERY 6 HOURS AS    NEEDED FOR PAIN;    Therapy: 57CGF1825 to (Evaluate:08Jan2016); Last Rx:28Rln8516 Ordered   21  Triamterene-HCTZ 37 5-25 MG Oral Tablet; Therapy: 82Pxv0391 to (Evaluate:98Ppu1545) Recorded    The medication list was reviewed and updated today  Allergies    1   No Known Food Allergies    Vitals   Recorded: 68BKG8002 09:34AM   Heart Rate 96   Systolic 274   Diastolic 84   Weight 848 lb      Physical Exam   Upon examination of the right hip does demonstrate surgical incision is well-healing, the patient does have some swelling but the compartments are soft  No pain with range of motion        Future Appointments    Date/Time Provider Specialty Site   02/29/2016 08:30 AM Shannon Meyers DO Orthopedic Surgery [facility]     Signatures   Electronically signed by : Rissa Zhang, [facility]; Jan 25 2016 10:43AM EST                       (Author)    Electronically signed by : Chico Rivera DO; Jan 25 2016 10:54AM EST                       (Author)

## 2018-01-13 VITALS — DIASTOLIC BLOOD PRESSURE: 91 MMHG | HEART RATE: 86 BPM | SYSTOLIC BLOOD PRESSURE: 129 MMHG

## 2018-01-13 VITALS
WEIGHT: 250 LBS | SYSTOLIC BLOOD PRESSURE: 138 MMHG | BODY MASS INDEX: 33.91 KG/M2 | HEART RATE: 90 BPM | DIASTOLIC BLOOD PRESSURE: 80 MMHG

## 2018-01-25 ENCOUNTER — TELEPHONE (OUTPATIENT)
Dept: OBGYN CLINIC | Facility: HOSPITAL | Age: 58
End: 2018-01-25

## 2018-01-25 ENCOUNTER — DOCUMENTATION (OUTPATIENT)
Dept: OBGYN CLINIC | Facility: HOSPITAL | Age: 58
End: 2018-01-25

## 2018-01-25 NOTE — TELEPHONE ENCOUNTER
Pt  Called wanting to know if he has any restrictions, he would like to know if he can Ski  Please advise

## 2018-01-25 NOTE — TELEPHONE ENCOUNTER
He may ski,      He except the same risk is anybody a gets on the slope    You may inform the patient

## 2019-05-08 RX ORDER — PREDNISONE 10 MG/1
TABLET ORAL
COMMUNITY
Start: 2014-11-01 | End: 2019-10-03

## 2019-05-08 RX ORDER — PENICILLIN V POTASSIUM 500 MG/1
TABLET ORAL
COMMUNITY
Start: 2014-07-15 | End: 2019-10-03

## 2019-05-08 RX ORDER — TOBRAMYCIN AND DEXAMETHASONE 3; 1 MG/ML; MG/ML
SUSPENSION/ DROPS OPHTHALMIC
COMMUNITY
Start: 2014-10-10 | End: 2019-10-03

## 2019-05-08 RX ORDER — OMEPRAZOLE 40 MG/1
1 CAPSULE, DELAYED RELEASE ORAL
COMMUNITY
Start: 2014-10-10

## 2019-05-08 RX ORDER — CYCLOBENZAPRINE HCL 10 MG
TABLET ORAL
COMMUNITY
Start: 2014-10-31 | End: 2019-10-03

## 2019-05-08 RX ORDER — MULTIVIT-MIN/IRON/FOLIC ACID/K 18-600-40
CAPSULE ORAL
COMMUNITY
End: 2019-10-03

## 2019-05-08 RX ORDER — CLOTRIMAZOLE AND BETAMETHASONE DIPROPIONATE 10; .64 MG/G; MG/G
CREAM TOPICAL AS NEEDED
Status: ON HOLD | COMMUNITY
Start: 2014-10-01 | End: 2020-06-01 | Stop reason: ALTCHOICE

## 2019-05-08 RX ORDER — FERROUS SULFATE 325(65) MG
TABLET ORAL
COMMUNITY
End: 2019-10-03

## 2019-05-08 RX ORDER — ACETAMINOPHEN 325 MG/1
TABLET ORAL
COMMUNITY
End: 2019-05-09

## 2019-05-08 RX ORDER — GABAPENTIN 300 MG/1
1 CAPSULE ORAL 3 TIMES DAILY
COMMUNITY
Start: 2013-12-04 | End: 2019-10-03

## 2019-05-08 RX ORDER — DIAZEPAM 5 MG/1
1 TABLET ORAL EVERY 8 HOURS PRN
COMMUNITY
Start: 2016-02-15 | End: 2019-10-03

## 2019-05-08 RX ORDER — LEVOFLOXACIN 500 MG/1
TABLET, FILM COATED ORAL
COMMUNITY
Start: 2014-08-06 | End: 2019-10-03

## 2019-05-08 RX ORDER — ALPRAZOLAM 0.25 MG/1
TABLET ORAL AS NEEDED
COMMUNITY
Start: 2014-05-08

## 2019-05-09 ENCOUNTER — OFFICE VISIT (OUTPATIENT)
Dept: OBGYN CLINIC | Facility: HOSPITAL | Age: 59
End: 2019-05-09
Payer: COMMERCIAL

## 2019-05-09 ENCOUNTER — HOSPITAL ENCOUNTER (OUTPATIENT)
Dept: RADIOLOGY | Facility: HOSPITAL | Age: 59
Discharge: HOME | End: 2019-05-09
Attending: ORTHOPAEDIC SURGERY
Payer: COMMERCIAL

## 2019-05-09 VITALS
SYSTOLIC BLOOD PRESSURE: 165 MMHG | BODY MASS INDEX: 34.27 KG/M2 | HEART RATE: 89 BPM | WEIGHT: 253 LBS | HEIGHT: 72 IN | DIASTOLIC BLOOD PRESSURE: 87 MMHG

## 2019-05-09 DIAGNOSIS — M79.604 LEG PAIN, ANTERIOR, RIGHT: ICD-10-CM

## 2019-05-09 DIAGNOSIS — M70.61 TROCHANTERIC BURSITIS OF RIGHT HIP: ICD-10-CM

## 2019-05-09 DIAGNOSIS — M70.62 TROCHANTERIC BURSITIS OF LEFT HIP: ICD-10-CM

## 2019-05-09 DIAGNOSIS — Z98.890 HISTORY OF LUMBAR SURGERY: ICD-10-CM

## 2019-05-09 DIAGNOSIS — M25.561 RIGHT KNEE PAIN, UNSPECIFIED CHRONICITY: ICD-10-CM

## 2019-05-09 DIAGNOSIS — M25.561 RIGHT KNEE PAIN, UNSPECIFIED CHRONICITY: Primary | ICD-10-CM

## 2019-05-09 PROCEDURE — 20610 DRAIN/INJ JOINT/BURSA W/O US: CPT | Performed by: PHYSICIAN ASSISTANT

## 2019-05-09 PROCEDURE — 73562 X-RAY EXAM OF KNEE 3: CPT

## 2019-05-09 PROCEDURE — 99213 OFFICE O/P EST LOW 20 MIN: CPT | Performed by: ORTHOPAEDIC SURGERY

## 2019-05-09 RX ORDER — BETAMETHASONE SODIUM PHOSPHATE AND BETAMETHASONE ACETATE 3; 3 MG/ML; MG/ML
6 INJECTION, SUSPENSION INTRA-ARTICULAR; INTRALESIONAL; INTRAMUSCULAR; SOFT TISSUE
Status: COMPLETED | OUTPATIENT
Start: 2019-05-09 | End: 2019-05-09

## 2019-05-09 RX ORDER — LIDOCAINE HYDROCHLORIDE 10 MG/ML
2 INJECTION, SOLUTION INFILTRATION; PERINEURAL
Status: COMPLETED | OUTPATIENT
Start: 2019-05-09 | End: 2019-05-09

## 2019-05-09 RX ORDER — BETAMETHASONE SODIUM PHOSPHATE AND BETAMETHASONE ACETATE 3; 3 MG/ML; MG/ML
12 INJECTION, SUSPENSION INTRA-ARTICULAR; INTRALESIONAL; INTRAMUSCULAR; SOFT TISSUE
Status: COMPLETED | OUTPATIENT
Start: 2019-05-09 | End: 2019-05-09

## 2019-05-09 RX ORDER — BUPIVACAINE HYDROCHLORIDE 2.5 MG/ML
2 INJECTION, SOLUTION INFILTRATION; PERINEURAL
Status: COMPLETED | OUTPATIENT
Start: 2019-05-09 | End: 2019-05-09

## 2019-05-09 RX ADMIN — LIDOCAINE HYDROCHLORIDE 2 ML: 10 INJECTION, SOLUTION INFILTRATION; PERINEURAL at 15:04

## 2019-05-09 RX ADMIN — LIDOCAINE HYDROCHLORIDE 2 ML: 10 INJECTION, SOLUTION INFILTRATION; PERINEURAL at 15:03

## 2019-05-09 RX ADMIN — BETAMETHASONE SODIUM PHOSPHATE AND BETAMETHASONE ACETATE 6 MG: 3; 3 INJECTION, SUSPENSION INTRA-ARTICULAR; INTRALESIONAL; INTRAMUSCULAR; SOFT TISSUE at 15:03

## 2019-05-09 RX ADMIN — BETAMETHASONE SODIUM PHOSPHATE AND BETAMETHASONE ACETATE 6 MG: 3; 3 INJECTION, SUSPENSION INTRA-ARTICULAR; INTRALESIONAL; INTRAMUSCULAR; SOFT TISSUE at 15:04

## 2019-05-09 RX ADMIN — BUPIVACAINE HYDROCHLORIDE 2 ML: 2.5 INJECTION, SOLUTION INFILTRATION; PERINEURAL at 15:04

## 2019-05-09 RX ADMIN — BETAMETHASONE SODIUM PHOSPHATE AND BETAMETHASONE ACETATE 12 MG: 3; 3 INJECTION, SUSPENSION INTRA-ARTICULAR; INTRALESIONAL; INTRAMUSCULAR; SOFT TISSUE at 15:04

## 2019-10-03 ENCOUNTER — APPOINTMENT (OUTPATIENT)
Dept: RADIOLOGY | Facility: OTHER | Age: 59
End: 2019-10-03
Payer: COMMERCIAL

## 2019-10-03 ENCOUNTER — OFFICE VISIT (OUTPATIENT)
Dept: OBGYN CLINIC | Facility: OTHER | Age: 59
End: 2019-10-03
Payer: COMMERCIAL

## 2019-10-03 VITALS
HEIGHT: 72 IN | DIASTOLIC BLOOD PRESSURE: 72 MMHG | HEART RATE: 83 BPM | BODY MASS INDEX: 34.54 KG/M2 | WEIGHT: 255 LBS | SYSTOLIC BLOOD PRESSURE: 130 MMHG

## 2019-10-03 DIAGNOSIS — M25.522 PAIN IN LEFT ELBOW: ICD-10-CM

## 2019-10-03 DIAGNOSIS — M24.9 INTERNAL DERANGEMENT OF ELBOW: Primary | ICD-10-CM

## 2019-10-03 PROCEDURE — 99214 OFFICE O/P EST MOD 30 MIN: CPT | Performed by: ORTHOPAEDIC SURGERY

## 2019-10-03 PROCEDURE — 73080 X-RAY EXAM OF ELBOW: CPT

## 2019-10-03 NOTE — PROGRESS NOTES
Assessment  Diagnoses and all orders for this visit:    Internal derangement of elbow    Pain in left elbow        Discussion and Plan:    · Explained to the patient that he has a mechanism of injury and physical examination consistent with a possible distal biceps tendon rupture  Will need to further evaluate this diagnosis with an MRI of his left elbow for possible surgical planning  He was in agreement and wished to proceed  · May perform activities as tolerated  Avoid painful maneuvers  · He will follow up after MRI for discussion of results and further treatment options based on these results  Subjective:   Patient ID: Crispin Jean Baptiste is a 62 y o  male      61 y/o male who presents today for an initial evaluation for his left elbow  Patient states that on 09/19/2019 he was trimming hedges in his yard and felt a "pop" in his left elbow followed by immediate pain  Patient denies swelling or ecchymosis about the elbow  Today, he points to the cubital fossa as the main source of his pain  He describes the pain as a mild, intermittent "dull, aching" sensation  He states that his symptoms are worse with pulling or lifting objects  He has had no prior treatment for his left elbow  Denies numbness and tingling, fevers or chills  The following portions of the patient's history were reviewed and updated as appropriate: allergies, current medications, past family history, past medical history, past social history, past surgical history and problem list     Review of Systems   Constitutional: Negative for chills, fatigue, fever and unexpected weight change  HENT: Negative for hearing loss, nosebleeds and sore throat  Eyes: Negative for pain, redness and visual disturbance  Respiratory: Negative for cough, shortness of breath and wheezing  Cardiovascular: Negative for chest pain, palpitations and leg swelling  Gastrointestinal: Negative for abdominal pain, nausea and vomiting     Endocrine: Negative for polydipsia and polyuria  Genitourinary: Negative for frequency and urgency  Skin: Negative for color change, rash and wound  Neurological: Negative for dizziness, weakness, numbness and headaches  Psychiatric/Behavioral: Negative for behavioral problems, self-injury and suicidal ideas  Objective:  Left Elbow Exam     Tenderness   Left elbow tenderness location: cubital fossa  Range of Motion   The patient has normal left elbow ROM  Muscle Strength   Supination:  4/5     Tests   Varus: negative  Valgus: negative    Other   Erythema: absent  Sensation: normal  Pulse: present    Comments:  Equivocal Biceps Active Test              Physical Exam   Constitutional: He is oriented to person, place, and time  He appears well-developed and well-nourished  No distress  Eyes: Pupils are equal, round, and reactive to light  Conjunctivae are normal    Neck: Normal range of motion  Neck supple  Cardiovascular: Normal rate, regular rhythm and intact distal pulses  Pulmonary/Chest: Effort normal and breath sounds normal    Abdominal: Soft  Bowel sounds are normal    Neurological: He is alert and oriented to person, place, and time  He has normal reflexes  Skin: Skin is warm and dry  No rash noted  No erythema  Psychiatric: He has a normal mood and affect  His behavior is normal          I have personally reviewed pertinent films in PACS and my interpretation is as follows      X Ray Left Elbow: No acute osseous abonormality    Scribe Attestation    I,:   Juma Bates am acting as a scribe while in the presence of the attending physician :        I,:   Abimbola Awad MD personally performed the services described in this documentation    as scribed in my presence :

## 2019-10-16 ENCOUNTER — HOSPITAL ENCOUNTER (OUTPATIENT)
Dept: MRI IMAGING | Facility: HOSPITAL | Age: 59
Discharge: HOME | End: 2019-10-16
Attending: ORTHOPAEDIC SURGERY
Payer: COMMERCIAL

## 2019-10-16 DIAGNOSIS — M25.522 PAIN IN LEFT ELBOW: ICD-10-CM

## 2019-10-16 DIAGNOSIS — M24.9 INTERNAL DERANGEMENT OF ELBOW: ICD-10-CM

## 2019-10-16 PROCEDURE — 73221 MRI JOINT UPR EXTREM W/O DYE: CPT

## 2019-10-22 ENCOUNTER — OFFICE VISIT (OUTPATIENT)
Dept: OBGYN CLINIC | Facility: OTHER | Age: 59
End: 2019-10-22
Payer: COMMERCIAL

## 2019-10-22 VITALS
DIASTOLIC BLOOD PRESSURE: 70 MMHG | SYSTOLIC BLOOD PRESSURE: 160 MMHG | BODY MASS INDEX: 34.54 KG/M2 | HEIGHT: 72 IN | WEIGHT: 255 LBS | HEART RATE: 62 BPM

## 2019-10-22 DIAGNOSIS — S46.212A RUPTURE OF LEFT DISTAL BICEPS TENDON, INITIAL ENCOUNTER: Primary | ICD-10-CM

## 2019-10-22 PROCEDURE — 99214 OFFICE O/P EST MOD 30 MIN: CPT | Performed by: ORTHOPAEDIC SURGERY

## 2019-10-22 NOTE — PROGRESS NOTES
Assessment  Diagnoses and all orders for this visit:    Rupture of left distal biceps tendon, initial encounter        Discussion and Plan:    - The MRI of the left elbow shows a left distal biceps tear without significant retraction and an intact muscle belly  Is likely the lacertus fibrosus is tethering this and clearly it has not retracted over the last month since the injury  - operative and nonoperative options were discussed in detail and given the patient's requirement of full elbow flexion and supination strength it is recommended he consider distal biceps repair, he does wish to consider this and has been placed on the schedule for this but needs to arrange his work schedule and possibly time off to recover from the procedure  Informed consent was obtained after thorough discussion risks and benefits the procedure as well as alternatives to procedure and will be scheduled for left distal biceps repair      Subjective:   Patient ID: Zoie Moore is a 62 y o  male    Patient is here to discuss an MRI of the Left elbow to evaluate for a possible distal biceps tendon rupture  Patient states that on 09/19/2019 he was trimming hedges in his yard and felt a "pop" in his left elbow followed by immediate pain  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  The following portions of the patient's history were reviewed and updated as appropriate: allergies, current medications, past family history, past medical history, past social history, past surgical history and problem list     Review of Systems    Objective:  /70   Pulse 62   Ht 6' (1 829 m)   Wt 116 kg (255 lb)   BMI 34 58 kg/m²       Left Elbow Exam     Tenderness   Left elbow tenderness location: distal biceps       Muscle Strength   Pronation:  5/5   Supination:  4/5     Other   Erythema: absent  Sensation: normal  Pulse: present          Physical Exam   Constitutional: He is oriented to person, place, and time  He appears well-developed and well-nourished  HENT:   Head: Normocephalic and atraumatic  Neck: Normal range of motion  Neck supple  Cardiovascular: Normal rate, regular rhythm and normal heart sounds  Pulmonary/Chest: Effort normal and breath sounds normal  No respiratory distress  Abdominal: Soft  He exhibits no distension  Neurological: He is alert and oriented to person, place, and time  Skin: Skin is warm and dry  Psychiatric: He has a normal mood and affect  His behavior is normal    Nursing note and vitals reviewed  I have personally reviewed pertinent films in PACS and my interpretation is as follows  MRI of the LEFT elbow that shows: Complete rupture of the distal biceps tendon with 2 5cm retraction      Scribe Attestation    I,:   Russ Clement am acting as a scribe while in the presence of the attending physician :        I,:   Carmelo Medrano MD personally performed the services described in this documentation    as scribed in my presence :

## 2019-11-19 NOTE — PRE-PROCEDURE INSTRUCTIONS
Pre-Surgery Instructions:   Medication Instructions    ALPRAZolam (XANAX) 0 25 mg tablet Instructed patient per Anesthesia Guidelines   amLODIPine (NORVASC) 5 mg tablet Instructed patient per Anesthesia Guidelines   b complex vitamins capsule Instructed patient per Anesthesia Guidelines   clotrimazole-betamethasone (LOTRISONE) 1-0 05 % cream Instructed patient per Anesthesia Guidelines   Multiple Vitamin (MULTIVITAMIN) capsule Instructed patient per Anesthesia Guidelines   omeprazole (PriLOSEC) 40 MG capsule Instructed patient per Anesthesia Guidelines   triamterene-hydrochlorothiazide (DYAZIDE) 37 5-25 mg per capsule Instructed patient per Anesthesia Guidelines  Pre op, medications and showering instructions reviewed  Patient getting hibiclens-Patient does not have a sling

## 2019-11-25 ENCOUNTER — ANESTHESIA EVENT (OUTPATIENT)
Dept: PERIOP | Facility: AMBULARY SURGERY CENTER | Age: 59
End: 2019-11-25
Payer: COMMERCIAL

## 2019-12-04 ENCOUNTER — APPOINTMENT (OUTPATIENT)
Dept: RADIOLOGY | Facility: AMBULARY SURGERY CENTER | Age: 59
End: 2019-12-04
Payer: COMMERCIAL

## 2019-12-04 ENCOUNTER — ANESTHESIA (OUTPATIENT)
Dept: PERIOP | Facility: AMBULARY SURGERY CENTER | Age: 59
End: 2019-12-04
Payer: COMMERCIAL

## 2019-12-04 ENCOUNTER — HOSPITAL ENCOUNTER (OUTPATIENT)
Facility: AMBULARY SURGERY CENTER | Age: 59
Setting detail: OUTPATIENT SURGERY
Discharge: HOME | End: 2019-12-04
Attending: ORTHOPAEDIC SURGERY | Admitting: ORTHOPAEDIC SURGERY
Payer: COMMERCIAL

## 2019-12-04 VITALS
HEIGHT: 72 IN | WEIGHT: 250 LBS | HEART RATE: 76 BPM | DIASTOLIC BLOOD PRESSURE: 60 MMHG | OXYGEN SATURATION: 95 % | TEMPERATURE: 98 F | SYSTOLIC BLOOD PRESSURE: 122 MMHG | RESPIRATION RATE: 16 BRPM | BODY MASS INDEX: 33.86 KG/M2

## 2019-12-04 DIAGNOSIS — S46.212D RUPTURE OF LEFT DISTAL BICEPS TENDON, SUBSEQUENT ENCOUNTER: Primary | ICD-10-CM

## 2019-12-04 PROCEDURE — 99024 POSTOP FOLLOW-UP VISIT: CPT | Performed by: ORTHOPAEDIC SURGERY

## 2019-12-04 PROCEDURE — 73070 X-RAY EXAM OF ELBOW: CPT

## 2019-12-04 PROCEDURE — C9290 INJ, BUPIVACAINE LIPOSOME: HCPCS | Performed by: STUDENT IN AN ORGANIZED HEALTH CARE EDUCATION/TRAINING PROGRAM

## 2019-12-04 PROCEDURE — C1713 ANCHOR/SCREW BN/BN,TIS/BN: HCPCS | Performed by: ORTHOPAEDIC SURGERY

## 2019-12-04 PROCEDURE — 24342 REPAIR OF RUPTURED TENDON: CPT | Performed by: PHYSICIAN ASSISTANT

## 2019-12-04 PROCEDURE — 24342 REPAIR OF RUPTURED TENDON: CPT | Performed by: ORTHOPAEDIC SURGERY

## 2019-12-04 DEVICE — SYSTEM IMPLANT DSTL BICEP REPAIR: Type: IMPLANTABLE DEVICE | Site: ELBOW | Status: FUNCTIONAL

## 2019-12-04 RX ORDER — PROPOFOL 10 MG/ML
INJECTION, EMULSION INTRAVENOUS AS NEEDED
Status: DISCONTINUED | OUTPATIENT
Start: 2019-12-04 | End: 2019-12-04 | Stop reason: SURG

## 2019-12-04 RX ORDER — KETOROLAC TROMETHAMINE 30 MG/ML
INJECTION, SOLUTION INTRAMUSCULAR; INTRAVENOUS AS NEEDED
Status: DISCONTINUED | OUTPATIENT
Start: 2019-12-04 | End: 2019-12-04 | Stop reason: SURG

## 2019-12-04 RX ORDER — CEFAZOLIN SODIUM 2 G/50ML
2000 SOLUTION INTRAVENOUS ONCE
Status: COMPLETED | OUTPATIENT
Start: 2019-12-04 | End: 2019-12-04

## 2019-12-04 RX ORDER — HYDROCODONE BITARTRATE AND ACETAMINOPHEN 5; 325 MG/1; MG/1
1 TABLET ORAL EVERY 6 HOURS PRN
Status: DISCONTINUED | OUTPATIENT
Start: 2019-12-04 | End: 2019-12-04 | Stop reason: HOSPADM

## 2019-12-04 RX ORDER — ACETAMINOPHEN 325 MG/1
650 TABLET ORAL EVERY 6 HOURS PRN
Status: DISCONTINUED | OUTPATIENT
Start: 2019-12-04 | End: 2019-12-04 | Stop reason: HOSPADM

## 2019-12-04 RX ORDER — HYDROCODONE BITARTRATE AND ACETAMINOPHEN 5; 325 MG/1; MG/1
1 TABLET ORAL EVERY 6 HOURS PRN
Qty: 12 TABLET | Refills: 0 | Status: SHIPPED | OUTPATIENT
Start: 2019-12-04 | End: 2019-12-14

## 2019-12-04 RX ORDER — BUPIVACAINE HYDROCHLORIDE 5 MG/ML
INJECTION, SOLUTION PERINEURAL
Status: COMPLETED | OUTPATIENT
Start: 2019-12-04 | End: 2019-12-04

## 2019-12-04 RX ORDER — FENTANYL CITRATE 50 UG/ML
INJECTION, SOLUTION INTRAMUSCULAR; INTRAVENOUS
Status: COMPLETED | OUTPATIENT
Start: 2019-12-04 | End: 2019-12-04

## 2019-12-04 RX ORDER — HYDROMORPHONE HCL/PF 1 MG/ML
0.5 SYRINGE (ML) INJECTION
Status: DISCONTINUED | OUTPATIENT
Start: 2019-12-04 | End: 2019-12-04 | Stop reason: HOSPADM

## 2019-12-04 RX ORDER — ONDANSETRON 2 MG/ML
4 INJECTION INTRAMUSCULAR; INTRAVENOUS EVERY 6 HOURS PRN
Status: DISCONTINUED | OUTPATIENT
Start: 2019-12-04 | End: 2019-12-04 | Stop reason: HOSPADM

## 2019-12-04 RX ORDER — ONDANSETRON 2 MG/ML
4 INJECTION INTRAMUSCULAR; INTRAVENOUS ONCE AS NEEDED
Status: DISCONTINUED | OUTPATIENT
Start: 2019-12-04 | End: 2019-12-04 | Stop reason: HOSPADM

## 2019-12-04 RX ORDER — LIDOCAINE HYDROCHLORIDE 10 MG/ML
INJECTION, SOLUTION INFILTRATION; PERINEURAL AS NEEDED
Status: DISCONTINUED | OUTPATIENT
Start: 2019-12-04 | End: 2019-12-04 | Stop reason: SURG

## 2019-12-04 RX ORDER — GABAPENTIN 100 MG/1
100 CAPSULE ORAL 3 TIMES DAILY
COMMUNITY
End: 2020-08-26

## 2019-12-04 RX ORDER — SODIUM CHLORIDE, SODIUM LACTATE, POTASSIUM CHLORIDE, CALCIUM CHLORIDE 600; 310; 30; 20 MG/100ML; MG/100ML; MG/100ML; MG/100ML
125 INJECTION, SOLUTION INTRAVENOUS CONTINUOUS
Status: DISCONTINUED | OUTPATIENT
Start: 2019-12-04 | End: 2019-12-04 | Stop reason: HOSPADM

## 2019-12-04 RX ORDER — FENTANYL CITRATE/PF 50 MCG/ML
25 SYRINGE (ML) INJECTION
Status: COMPLETED | OUTPATIENT
Start: 2019-12-04 | End: 2019-12-04

## 2019-12-04 RX ORDER — MIDAZOLAM HYDROCHLORIDE 2 MG/2ML
INJECTION, SOLUTION INTRAMUSCULAR; INTRAVENOUS
Status: COMPLETED | OUTPATIENT
Start: 2019-12-04 | End: 2019-12-04

## 2019-12-04 RX ORDER — ONDANSETRON 2 MG/ML
INJECTION INTRAMUSCULAR; INTRAVENOUS AS NEEDED
Status: DISCONTINUED | OUTPATIENT
Start: 2019-12-04 | End: 2019-12-04 | Stop reason: SURG

## 2019-12-04 RX ORDER — DEXAMETHASONE SODIUM PHOSPHATE 10 MG/ML
INJECTION, SOLUTION INTRAMUSCULAR; INTRAVENOUS AS NEEDED
Status: DISCONTINUED | OUTPATIENT
Start: 2019-12-04 | End: 2019-12-04 | Stop reason: SURG

## 2019-12-04 RX ORDER — LIDOCAINE HYDROCHLORIDE 10 MG/ML
0.5 INJECTION, SOLUTION EPIDURAL; INFILTRATION; INTRACAUDAL; PERINEURAL ONCE AS NEEDED
Status: DISCONTINUED | OUTPATIENT
Start: 2019-12-04 | End: 2019-12-04 | Stop reason: HOSPADM

## 2019-12-04 RX ADMIN — FENTANYL CITRATE 25 MCG: 50 INJECTION, SOLUTION INTRAMUSCULAR; INTRAVENOUS at 09:55

## 2019-12-04 RX ADMIN — FENTANYL CITRATE 25 MCG: 50 INJECTION, SOLUTION INTRAMUSCULAR; INTRAVENOUS at 09:42

## 2019-12-04 RX ADMIN — FENTANYL CITRATE 25 MCG: 50 INJECTION, SOLUTION INTRAMUSCULAR; INTRAVENOUS at 10:59

## 2019-12-04 RX ADMIN — FENTANYL CITRATE 50 MCG: 50 INJECTION, SOLUTION INTRAMUSCULAR; INTRAVENOUS at 08:55

## 2019-12-04 RX ADMIN — MIDAZOLAM HYDROCHLORIDE 2 MG: 1 INJECTION, SOLUTION INTRAMUSCULAR; INTRAVENOUS at 08:55

## 2019-12-04 RX ADMIN — FENTANYL CITRATE 25 MCG: 50 INJECTION, SOLUTION INTRAMUSCULAR; INTRAVENOUS at 11:17

## 2019-12-04 RX ADMIN — FENTANYL CITRATE 25 MCG: 50 INJECTION, SOLUTION INTRAMUSCULAR; INTRAVENOUS at 10:16

## 2019-12-04 RX ADMIN — DEXAMETHASONE SODIUM PHOSPHATE 4 MG: 10 INJECTION, SOLUTION INTRAMUSCULAR; INTRAVENOUS at 09:22

## 2019-12-04 RX ADMIN — FENTANYL CITRATE 25 MCG: 50 INJECTION, SOLUTION INTRAMUSCULAR; INTRAVENOUS at 11:04

## 2019-12-04 RX ADMIN — KETOROLAC TROMETHAMINE 15 MG: 30 INJECTION, SOLUTION INTRAMUSCULAR at 11:39

## 2019-12-04 RX ADMIN — FENTANYL CITRATE 25 MCG: 50 INJECTION, SOLUTION INTRAMUSCULAR; INTRAVENOUS at 09:23

## 2019-12-04 RX ADMIN — FENTANYL CITRATE 25 MCG: 50 INJECTION, SOLUTION INTRAMUSCULAR; INTRAVENOUS at 11:12

## 2019-12-04 RX ADMIN — PROPOFOL 200 MG: 10 INJECTION, EMULSION INTRAVENOUS at 09:18

## 2019-12-04 RX ADMIN — CEFAZOLIN SODIUM 2000 MG: 2 SOLUTION INTRAVENOUS at 09:15

## 2019-12-04 RX ADMIN — SODIUM CHLORIDE, SODIUM LACTATE, POTASSIUM CHLORIDE, AND CALCIUM CHLORIDE: .6; .31; .03; .02 INJECTION, SOLUTION INTRAVENOUS at 09:15

## 2019-12-04 RX ADMIN — LIDOCAINE HYDROCHLORIDE 50 MG: 10 INJECTION, SOLUTION INFILTRATION; PERINEURAL at 09:18

## 2019-12-04 RX ADMIN — BUPIVACAINE HYDROCHLORIDE 5 ML: 5 INJECTION, SOLUTION PERINEURAL at 08:55

## 2019-12-04 RX ADMIN — ONDANSETRON 4 MG: 2 INJECTION INTRAMUSCULAR; INTRAVENOUS at 10:18

## 2019-12-04 RX ADMIN — BUPIVACAINE 20 ML: 13.3 INJECTION, SUSPENSION, LIPOSOMAL INFILTRATION at 08:54

## 2019-12-04 NOTE — OP NOTE
OPERATIVE REPORT  PATIENT NAME: Neel Selby    :  1960  MRN: [id number]  Pt Location: [facility] OR ROOM 06    SURGERY DATE: 2019     SURGEON: Keo Epstein MD     ASSISTANT: Kerri Garrison PA-C     NOTE: Kerri Garrison PA-C was present throughout the entire procedure and performed essential assistance with patient prepping, draping, positioning, biceps reduction, wound closure, sterile dressing application and sling application, all under my direct supervision  NOTE: No qualified resident physician was available for assistance    PREOPERATIVE DIAGNOSIS:  Left Distal Biceps Rupture    POSTOPERATIVE DIAGNOSIS:  Same    PROCEDURES:  Left Distal Biceps Repair    ANESTHESIA STAFF: Zaira Andersen MD     ANESTHESIA TYPE: General with LMA with ultrasound guided interscalene block (Exparel)  The interscalene block was provided by the anesthesia staff per my request for postoperative pain control and to decrease the use of postoperative narcotic medication for pain control  COMPLICATIONS: None    FINDINGS:  Complete distal biceps rupture with significant degeneration of distal biceps tendon    SPECIMEN(S):  None    ESTIMATED BLOOD LOSS: Minimal  (Tourniquet Time 43 mins)    INDICATIONS FOR PROCEDURE:  The patient is a 61 y o  male presenting with pain and lack of function secondary to a distal biceps rupture of the left elbow  After a thorough discussion of the risks and benefits of operative and nonoperative care the patient elected for left distal biceps repair  Informed consent was obtained in the office  OPERATIVE TECHNIQUE:  The day of surgery I identified the patient's left elbow and marked it with my initials  The patient was taken back to the operating room where general anesthesia  was induced by the anesthesia staff without complication  Regional block was preformed in the pre-op holding area by anesthesia under ultrasound guidance for post-op pain control    The patient was placed in the supine position with the left arm on a hand table  The left elbow was prepped and draped in routine sterile fashion and after a time-out for safety and confirming 2 grams of IV Cefazolin were given, a tourniquet was inflated to 225 mmHg with total time of 43 minutes  A transverse incision distal to the flexion crease was made  Blunt dissection was carried proximally and the distal end of the biceps tendon was identified and found to be scarred into the lacertus fibrosus and to the brachialis muscle  Careful dissection allowed me to free up the distal biceps tendon and retrieve it through the incision  There was severe tenosynovitis and tendon degeneration, the tendon was debrided to a stable border  A whipstitch with an Arthrex fiber loop was then placed in the distal biceps tendon  Attention was then turned to the biceps insertion on the radial tuberosity, following along the anatomic tract of the biceps tendon the tuberosity was identified and gentle retraction was performed to expose the bicipital tuberosity for an anatomic positioning of the repair  Care was taken to avoid injury to the neurovascular structures during the approach and retraction, the chronicity of the injury did make this slightly more challenging but we were able to identified the anatomic tract and the bicipital tuberosity  After identifying the bicipital tuberosity a guidewire was placed bicortically  A 6 5 mm reamer from the Arthrex system was then used to slightly decorticate the proximal cortex to allow for healing, the whipstitch was then placed in the Arthrex biceps button and the button was deployed past the distal cortex and flipped taking care not to entrap any soft tissue on the far side of the cortex  This was confirmed under fluoroscopy  The biceps tendon was then brought into contact with the proximal cortex and tied off with a non-sliding knot     The elbow was brought through a range of motion with appropriate tension to the repair and no gapping  The area was then irrigated and the subdermal layer with 3-0 Vicryl with 4-0 Monocryl for skin  Sterile dressings and a sling was placed  The patient was awoken  The patient was transported to the recovery room in good condition and will be discharged to home and physical therapy will be initiated following the standard distal biceps repair protocol      SIGNATURE: Finn Holder MD  DATE: December 4, 2019  TIME: 10:26 AM

## 2019-12-04 NOTE — ANESTHESIA PROCEDURE NOTES
Peripheral Block    Patient location during procedure: holding area  Start time: 12/4/2019 8:54 AM  Reason for block: at surgeon's request and post-op pain management  Staffing  Anesthesiologist: Jennifer Calderón MD  Performed: anesthesiologist   Preanesthetic Checklist  Completed: patient identified, site marked, surgical consent, pre-op evaluation, timeout performed, IV checked, risks and benefits discussed and monitors and equipment checked  Peripheral Block  Patient position: sitting  Prep: ChloraPrep  Patient monitoring: cardiac monitor, continuous pulse ox, frequent blood pressure checks and heart rate  Block type: interscalene  Laterality: left  Injection technique: single-shot  Procedures: ultrasound guided, Ultrasound guidance required for the procedure to increase accuracy and safety of medication placement and decrease risk of complications  Ultrasound permanent image savedbupivacaine (MARCAINE) 0 5 % perineural infiltration, 5 mL  fentaNYL 50 mcg/mL IV, 50 mcg  midazolam (VERSED) 2 mg/2 mL IV, 2 mg  Needle  Needle type: Stimuplex   Needle gauge: 22 G  Needle length: 5 cm  Needle localization: ultrasound guidance  Assessment  Injection assessment: incremental injection, local visualized surrounding nerve on ultrasound, negative aspiration for heme and no paresthesia on injection  Paresthesia pain: none  Heart rate change: no  Slow fractionated injection: yes  Post-procedure:  site cleaned  patient tolerated the procedure well with no immediate complications  Additional Notes  Picture uploaded via [employer] virgil  Exparel bracelet appkamar

## 2019-12-04 NOTE — DISCHARGE INSTRUCTIONS
Discharge Instructions - Orthopedics  Geronimo Dania 61 y o  male MRN: [id number]  Unit/Bed#: OPERATING ROOM     Weight Bearing Status:                                           No bearing weight on the left arm  Left arm in sling  No active bicep flexion    Pain:  Continue analgesics as directed  · Tylenol or Motrin preferred for pain - refer to bottle for directions  · Hydrocodone for severe pain only  No refills will be provided    Dressing Instructions:   Keep dressing clean, dry and intact until follow up appointment  You may shower but cover dressing/incision with waterproof bag    PT/OT:  Continue PT/OT on outpatient basis as directed    Appt Instructions:    If you do not have your appointment, please call the clinic at [phone] to f/u with Mert Boyd in 5-10 days  Otherwise followup as scheduled     Contact the office sooner if you experience any increased numbness/tingling in the extremities, uncontrolled pain or bleeding

## 2019-12-04 NOTE — ANESTHESIA POSTPROCEDURE EVALUATION
Post-Op Assessment Note    CV Status:  Stable  Pain Score: 0    Pain management: adequate     Mental Status:  Alert and awake   Hydration Status:  Euvolemic   PONV Controlled:  Controlled   Airway Patency:  Patent   Post Op Vitals Reviewed: Yes      Staff: AnesthesiologistSAMANTA           BP (P) 148/93 (12/04/19 1048)    Temp (P) 98 6 °F (37 °C) (12/04/19 1048)    Pulse (P) 90 (12/04/19 1048)   Resp (P) 16 (12/04/19 1048)    SpO2 (P) 96 % (12/04/19 1048)

## 2019-12-04 NOTE — ANESTHESIA PREPROCEDURE EVALUATION
Review of Systems/Medical History  Patient summary reviewed  Chart reviewed      Cardiovascular  Hypertension ,    Pulmonary  Smoker ,        GI/Hepatic    GERD ,        Negative  ROS        Endo/Other    Obesity (BMI 29)    GYN       Hematology  Negative hematology ROS      Musculoskeletal    Arthritis     Neurology  Negative neurology ROS      Psychology   Anxiety,            Physical Exam    Airway    Mallampati score: II  TM Distance: >3 FB  Neck ROM: full     Dental   No notable dental hx     Cardiovascular      Pulmonary      Other Findings    No recent labs    Anesthesia Plan  ASA Score- 2     Anesthesia Type- regional and general with ASA Monitors  Additional Monitors:   Airway Plan: LMA  Plan Factors-    Induction- intravenous  Postoperative Plan-     Informed Consent- Anesthetic plan and risks discussed with patient and spouse  I personally reviewed this patient with the CRNA  Discussed and agreed on the Anesthesia Plan with the CRNA  Maryam Ramirez

## 2019-12-04 NOTE — H&P
I identified and marked the patient in the pre-op holding area after confirming the surgical consent  No changes to medical health since the H&P was preformed  The [facility] ensure compliance with applicable state laws  His History and Physical has been updated below  Assessment  Diagnoses and all orders for this visit:     Rupture of left distal biceps tendon, initial encounter           Discussion and Plan:     - The MRI of the left elbow shows a left distal biceps tear without significant retraction and an intact muscle belly  Is likely the lacertus fibrosus is tethering this and clearly it has not retracted over the last month since the injury  - operative and nonoperative options were discussed in detail and given the patient's requirement of full elbow flexion and supination strength it is recommended he consider distal biceps repair, he does wish to consider this and has been placed on the schedule for this but needs to arrange his work schedule and possibly time off to recover from the procedure  Informed consent was obtained after thorough discussion risks and benefits the procedure as well as alternatives to procedure and will be scheduled for left distal biceps repair        Subjective:   Patient ID: Erin Canchola is a 62 y o  male     Patient is here to discuss an MRI of the Left elbow to evaluate for a possible distal biceps tendon rupture  Patient states that on 09/19/2019 he was trimming hedges in his yard and felt a "pop" in his left elbow followed by immediate pain       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          The following portions of the patient's history were reviewed and updated as appropriate: allergies, current medications, past family history, past medical history, past social history, past surgical history and problem list      Review of Systems     Objective:  /74   Pulse 83   Temp 98 4 °F (36 9 °C) (Temporal)   Resp 18   Ht 6' (1 829 m)   Wt 113 kg (250 lb)   SpO2 96%   BMI 33 91 kg/m²           Left Elbow Exam      Tenderness   Left elbow tenderness location: distal biceps       Muscle Strength   Pronation:  5/5   Supination:  4/5      Other   Erythema: absent  Sensation: normal  Pulse: present              Physical Exam   Constitutional: He is oriented to person, place, and time  He appears well-developed and well-nourished  HENT:   Head: Normocephalic and atraumatic  Neck: Normal range of motion  Neck supple  Cardiovascular: Normal rate, regular rhythm and normal heart sounds  Pulmonary/Chest: Effort normal and breath sounds normal  No respiratory distress  Abdominal: Soft  He exhibits no distension  Neurological: He is alert and oriented to person, place, and time  Skin: Skin is warm and dry  Psychiatric: He has a normal mood and affect   His behavior is normal    Nursing note and vitals reviewed            I have personally reviewed pertinent films in PACS and my interpretation is as follows      MRI of the LEFT elbow that shows: Complete rupture of the distal biceps tendon with 2 5cm retraction

## 2019-12-09 ENCOUNTER — OFFICE VISIT (OUTPATIENT)
Dept: OBGYN CLINIC | Facility: HOSPITAL | Age: 59
End: 2019-12-09

## 2019-12-09 VITALS
BODY MASS INDEX: 34.54 KG/M2 | DIASTOLIC BLOOD PRESSURE: 86 MMHG | HEART RATE: 90 BPM | WEIGHT: 255 LBS | SYSTOLIC BLOOD PRESSURE: 137 MMHG | HEIGHT: 72 IN

## 2019-12-09 DIAGNOSIS — Z47.89 AFTERCARE FOLLOWING SURGERY OF THE MUSCULOSKELETAL SYSTEM: Primary | ICD-10-CM

## 2019-12-09 PROCEDURE — 99024 POSTOP FOLLOW-UP VISIT: CPT | Performed by: PHYSICIAN ASSISTANT

## 2019-12-09 NOTE — PROGRESS NOTES
Assessment:       1  Aftercare following surgery of the musculoskeletal system          Plan:          Patient is doing well postoperatively  Operative note, images, and rehab protocol were discussed  All questions were addressed to the patient's satisfaction  Had a lengthy discussion with patient's to differentiate between passive range of motion versus active range of motion  I stressed the importance of following the rehab protocol to allow his biceps tendon repair to heal properly  I also stressed the importance of wearing his sling for 2 weeks from the date of the surgery  Patient should start PT this week  Follow-up will be in 6 weeks to assess patient's progress  Subjective:     Patient ID: Marcie Santo is a 61 y o  male  Chief Complaint:    HPI    This patient presents to the office 5 days status post distal biceps tendon repair  His procedure was performed on 12/04/2019  He states his pain is controlled  He states he feels pain whenever he is bending his elbow  He denies any numbness or tingling  Social History     Occupational History    Not on file   Tobacco Use    Smoking status: Light Tobacco Smoker    Smokeless tobacco: Never Used    Tobacco comment: social smoker   Substance and Sexual Activity    Alcohol use: Yes     Frequency: 2-4 times a month     Drinks per session: 1 or 2     Binge frequency: Never     Comment: socially    Drug use: No    Sexual activity: Not on file      Review of Systems   Constitutional: Negative  Respiratory: Negative  Musculoskeletal: Positive for joint swelling and myalgias  Negative for arthralgias  Skin: Positive for wound  Neurological: Positive for weakness  Negative for numbness  Psychiatric/Behavioral: Negative  Objective:     Ortho ExamPhysical Exam   Constitutional: He is oriented to person, place, and time  He appears well-developed and well-nourished  HENT:   Head: Atraumatic     Cardiovascular: Intact distal pulses  Musculoskeletal:   Arm in sling  Range of motion and strength not tested  Intact distal biceps palpable  Neurological: He is alert and oriented to person, place, and time  Skin: Skin is warm and dry  Capillary refill takes less than 2 seconds  Incision dry and clean  Psychiatric: He has a normal mood and affect   His behavior is normal

## 2019-12-11 ENCOUNTER — EVALUATION (OUTPATIENT)
Dept: PHYSICAL THERAPY | Facility: REHABILITATION | Age: 59
End: 2019-12-11
Payer: COMMERCIAL

## 2019-12-11 DIAGNOSIS — S46.212D BICEPS RUPTURE, PROXIMAL, LEFT, SUBSEQUENT ENCOUNTER: Primary | ICD-10-CM

## 2019-12-11 DIAGNOSIS — S46.212A RUPTURE OF LEFT DISTAL BICEPS TENDON, INITIAL ENCOUNTER: ICD-10-CM

## 2019-12-11 PROCEDURE — 97161 PT EVAL LOW COMPLEX 20 MIN: CPT | Performed by: PHYSICAL THERAPIST

## 2019-12-11 NOTE — PROGRESS NOTES
PT Evaluation     Today's date: 2019  Patient name: Geronimo Najera  : 1960  MRN: [id number]  Referring provider: Zari Rendon MD  Dx:   Encounter Diagnosis     ICD-10-CM    1  Biceps rupture, proximal, left, subsequent encounter S46 212D    2  Rupture of left distal biceps tendon, initial encounter S46 212A Ambulatory referral to Physical Therapy                  Assessment  Assessment details: Patient is a 61 y o  male referred to PT by Dr Cortney Olivia with a dx of s/p L distal bicep repair  Patient underwent surgical intervention on 2019  He presents to PT today utilizing a sling for immobilization and no motion control brace  Inspection of the incision site finds the steri-strips intact and without evidence of infection  No other referral appears necessary at this time  Impairments: abnormal or restricted ROM, activity intolerance, impaired physical strength, lacks appropriate home exercise program and pain with function  Functional limitations: IADLs; OOW; Recreational activities  Symptom irritability: lowBarriers to therapy: None  Understanding of Dx/Px/POC: good   Prognosis: good    Goals  Short Term goals - 6 weeks  1  Patient will be independent and compliant with a HEP  2   Patient will report a 50% decrease in pain complaints  Long Term goals - 12 weeks  1  Patient will report elimination of pain complaints  2   Patient will return to all work related activities without restriction  3   Patient will return to all recreational activities without restriction        Plan  Patient would benefit from: skilled physical therapy  Planned modality interventions: cryotherapy  Planned therapy interventions: joint mobilization, manual therapy, neuromuscular re-education, patient education, therapeutic exercise and home exercise program  Frequency: 2x week  Duration in visits: 12  Duration in weeks: 6  Treatment plan discussed with: patient        Subjective Evaluation    History of Present Illness  Date of surgery: 2019  Mechanism of injury: surgery          Not a recurrent problem   Quality of life: good    Pain  Current pain ratin  At best pain ratin  At worst pain ratin  Location: L elbow  Quality: discomfort and dull ache  Relieving factors: rest  Exacerbated by: Quick movement    Progression: improved    Social Support    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)  Hand dominance: right    Treatments  Previous treatment: immobilization  Current treatment: physical therapy  Patient Goals  Patient goals for therapy: decreased pain, increased motion, increased strength, independence with ADLs/IADLs, return to sport/leisure activities and return to work          Objective     Observations     Additional Observation Details  Steri strips over incision intact - no evidence of infection    Active Range of Motion     Right Elbow   Normal active range of motion    Additional Active Range of Motion Details  L elbow AROM not assessed because of physician protocol    Passive Range of Motion     Left Elbow   Flexion: 105 degrees   Extension: 30 degrees with pain  Forearm supination: 45 degrees   Forearm pronation: 10 degrees with pain    Right Elbow   Normal passive range of motion    Strength/Myotome Testing     Right Elbow   Normal strength    Additional Strength Details  L elbow forearm strength not assessed due to physician protocol             Precautions: SEE PROTOCOL      Manual              L elbow/forearm PROM                                                                     Exercise Diary                                                                                                                                                                                                                                                                                      Modalities

## 2019-12-26 ENCOUNTER — OFFICE VISIT (OUTPATIENT)
Dept: PHYSICAL THERAPY | Facility: REHABILITATION | Age: 59
End: 2019-12-26
Payer: COMMERCIAL

## 2019-12-26 DIAGNOSIS — S46.212D BICEPS RUPTURE, PROXIMAL, LEFT, SUBSEQUENT ENCOUNTER: Primary | ICD-10-CM

## 2019-12-26 PROCEDURE — 97140 MANUAL THERAPY 1/> REGIONS: CPT | Performed by: PHYSICAL THERAPIST

## 2019-12-26 PROCEDURE — 97110 THERAPEUTIC EXERCISES: CPT | Performed by: PHYSICAL THERAPIST

## 2019-12-26 NOTE — PROGRESS NOTES
PT Evaluation     Today's date: 2019  Patient name: Zion Chris  : 1960  MRN: [id number]  Referring provider: Alexandra Nieves MD  Dx:   Encounter Diagnosis     ICD-10-CM    1  Biceps rupture, proximal, left, subsequent encounter S46 212D                   Assessment  Assessment details: Patient is a 61 y o  male referred to PT by Dr Jan Cowart with a dx of s/p L distal bicep repair  Patient underwent surgical intervention on 2019  He presents to PT today utilizing a sling for immobilization and no motion control brace  Inspection of the incision site finds the steri-strips intact and without evidence of infection  No other referral appears necessary at this time  Impairments: abnormal or restricted ROM, activity intolerance, impaired physical strength, lacks appropriate home exercise program and pain with function  Functional limitations: IADLs; OOW; Recreational activities  Symptom irritability: lowBarriers to therapy: None  Understanding of Dx/Px/POC: good   Prognosis: good    Goals  Short Term goals - 6 weeks  1  Patient will be independent and compliant with a HEP  2   Patient will report a 50% decrease in pain complaints  Long Term goals - 12 weeks  1  Patient will report elimination of pain complaints  2   Patient will return to all work related activities without restriction  3   Patient will return to all recreational activities without restriction        Plan  Patient would benefit from: skilled physical therapy  Planned modality interventions: cryotherapy  Planned therapy interventions: joint mobilization, manual therapy, neuromuscular re-education, patient education, therapeutic exercise and home exercise program  Frequency: 2x week  Duration in visits: 12  Duration in weeks: 6  Treatment plan discussed with: patient        Subjective Evaluation    History of Present Illness  Date of surgery: 2019  Mechanism of injury: surgery          Not a recurrent problem Quality of life: good    Pain  Current pain ratin  At best pain ratin  At worst pain ratin  Location: L elbow  Quality: discomfort and dull ache  Relieving factors: rest  Exacerbated by: Quick movement    Progression: improved    Social Support    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)  Hand dominance: right    Treatments  Previous treatment: immobilization  Current treatment: physical therapy  Patient Goals  Patient goals for therapy: decreased pain, increased motion, increased strength, independence with ADLs/IADLs, return to sport/leisure activities and return to work          Objective     Observations     Additional Observation Details  Steri strips over incision intact - no evidence of infection    Active Range of Motion     Right Elbow   Normal active range of motion    Additional Active Range of Motion Details  L elbow AROM not assessed because of physician protocol    Passive Range of Motion     Left Elbow   Flexion: 105 degrees   Extension: 30 degrees with pain  Forearm supination: 45 degrees   Forearm pronation: 10 degrees with pain    Right Elbow   Normal passive range of motion    Strength/Myotome Testing     Right Elbow   Normal strength    Additional Strength Details  L elbow forearm strength not assessed due to physician protocol             Precautions: SEE PROTOCOL      Manual              L elbow/forearm PROM                                                                     Exercise Diary                                                                                                                                                                                                                                                                                      Modalities

## 2019-12-26 NOTE — PROGRESS NOTES
Daily Note     Today's date: 2019  Patient name: Kitty Kovacs  : 1960  MRN: [id number]  Referring provider: Vicente Lambert MD  Dx:   Encounter Diagnosis     ICD-10-CM    1  Biceps rupture, proximal, left, subsequent encounter S46 579X                   Subjective: Reports compliance with sling and not performing arom        Objective: See treatment diary below      Assessment: Able to progress to no sling and arom in all planes of elbow and forearm  Near full PROM in all planes  Plan: Continue per plan of care        Precautions: SEE PROTOCOL      Manual              L elbow/forearm PROM 20'                                                                    Exercise Diary              Elbow flex/ext arom 20            Forearm pro/sup arom elbow straight/flexed 90 20                                                                                                                                                                                                                                                          Modalities

## 2020-01-13 ENCOUNTER — OFFICE VISIT (OUTPATIENT)
Dept: OBGYN CLINIC | Facility: HOSPITAL | Age: 60
End: 2020-01-13

## 2020-01-13 ENCOUNTER — TELEPHONE (OUTPATIENT)
Dept: OBGYN CLINIC | Facility: HOSPITAL | Age: 60
End: 2020-01-13

## 2020-01-13 VITALS
BODY MASS INDEX: 34.67 KG/M2 | HEART RATE: 97 BPM | HEIGHT: 72 IN | DIASTOLIC BLOOD PRESSURE: 91 MMHG | SYSTOLIC BLOOD PRESSURE: 146 MMHG | WEIGHT: 256 LBS

## 2020-01-13 DIAGNOSIS — Z47.89 AFTERCARE FOLLOWING SURGERY OF THE MUSCULOSKELETAL SYSTEM: Primary | ICD-10-CM

## 2020-01-13 PROCEDURE — 99024 POSTOP FOLLOW-UP VISIT: CPT | Performed by: PHYSICIAN ASSISTANT

## 2020-01-13 NOTE — PROGRESS NOTES
Assessment:       1  Aftercare following surgery of the musculoskeletal system          Plan:        Patient is making progress postoperatively  He is complaining of diffuse aches and pains in his upper biceps and shoulder, which could be related to atrophy and lack of use of his left shoulder  We will continue to monitor these symptoms as he begins the early strengthening phase of rehab  All questions were addressed to the patient's satisfaction  Patient will continue PT  Follow-up will be in 6 weeks to assess patient's progress  Subjective:     Patient ID: Yakelin Beavers is a 61 y o  male  Chief Complaint:    HPI  Patient presents to the office for follow-up status post left distal biceps tendon repair  Surgery was 12/04/2019  He states he is making progress but he is concerned about the aches and pains he experiences daily  He describes pain to be dull achy and constant  He denies any new trauma  He states his pain includes his upper arm muscles and the front and the back of his shoulders  He has some mild residual paresthesia in the is a volar aspect of his forearm just below the antecubital incision  Social History     Occupational History    Not on file   Tobacco Use    Smoking status: Light Tobacco Smoker    Smokeless tobacco: Never Used    Tobacco comment: social smoker   Substance and Sexual Activity    Alcohol use: Yes     Frequency: 2-4 times a month     Drinks per session: 1 or 2     Binge frequency: Never     Comment: socially    Drug use: No    Sexual activity: Not on file      Review of Systems   Constitutional: Negative  Respiratory: Negative  Musculoskeletal: Positive for arthralgias and myalgias  Skin: Negative for wound  Neurological: Positive for weakness and numbness  Psychiatric/Behavioral: Negative  Objective:     Ortho ExamPhysical Exam   Constitutional: He is oriented to person, place, and time  He appears well-nourished     HENT:   Head: Atraumatic  Cardiovascular: Intact distal pulses  Pulmonary/Chest: Effort normal    Musculoskeletal:   Full flexion and full extension of left elbow  Left distal Biceps tendon repair palpably intact  Neurological: He is alert and oriented to person, place, and time  A sensory deficit is present  Skin: Skin is warm and dry  Capillary refill takes less than 2 seconds  Incision closed, dry and clean  Psychiatric: He has a normal mood and affect   His behavior is normal

## 2020-01-13 NOTE — TELEPHONE ENCOUNTER
Dr Perea   #: [phone]         Patient is calling In requesting a call back  He would like to know if office received disability paperwork?  Please advise, thank you

## 2020-01-13 NOTE — PATIENT INSTRUCTIONS
1  Continue PT per protocol, early strengthening phase  2  No lifting, pulling, or pushing left arm  3   Follow-up in 6 weeks

## 2020-01-14 NOTE — TELEPHONE ENCOUNTER
Patient called in wanting to know if we received his paperwork from [place]  He is going to come in and hand deliver those to our office later today just wanted us to be aware

## 2020-01-14 NOTE — TELEPHONE ENCOUNTER
Called patient in regards to his disability paperwork  I could not leave a message because the mailbox was full   I was calling to advise the patient we did received this form on 1/13/2020

## 2020-01-15 ENCOUNTER — OFFICE VISIT (OUTPATIENT)
Dept: PHYSICAL THERAPY | Facility: REHABILITATION | Age: 60
End: 2020-01-15
Payer: COMMERCIAL

## 2020-01-15 DIAGNOSIS — S46.212D BICEPS RUPTURE, PROXIMAL, LEFT, SUBSEQUENT ENCOUNTER: Primary | ICD-10-CM

## 2020-01-15 PROCEDURE — 97140 MANUAL THERAPY 1/> REGIONS: CPT | Performed by: PHYSICAL THERAPIST

## 2020-01-15 PROCEDURE — 97110 THERAPEUTIC EXERCISES: CPT | Performed by: PHYSICAL THERAPIST

## 2020-01-15 PROCEDURE — 97112 NEUROMUSCULAR REEDUCATION: CPT | Performed by: PHYSICAL THERAPIST

## 2020-01-15 NOTE — PROGRESS NOTES
Daily Note     Today's date: 1/15/2020  Patient name: Rosalino Kenny  : 1960  MRN: [id number]  Referring provider: Mando Vergara MD  Dx:   Encounter Diagnosis     ICD-10-CM    1  Biceps rupture, proximal, left, subsequent encounter S46 138T                   Subjective: Reporting some increased L shoulder pain over the past 2 days  None upon entering PT today       Objective: See treatment diary below      Assessment: Appears to be having some impingement related symptoms and will address the shoulder appropriately  Plan: Continue per plan of care        Precautions: SEE PROTOCOL      Manual  12/26 1/15           L elbow/forearm PROM 20' 10'           L shld PROM  5'                                                      Exercise Diary  12/26 1/15           Elbow flex/ext arom 20 30           Forearm pro/sup arom elbow straight/flexed 90 20 Elbow 90 cane 2x15           Elbow flexion MRE  2x15           S/L ER   2x10                                                                                                                                                                                                                               Modalities

## 2020-01-20 ENCOUNTER — TELEPHONE (OUTPATIENT)
Dept: OBGYN CLINIC | Facility: HOSPITAL | Age: 60
End: 2020-01-20

## 2020-01-20 NOTE — TELEPHONE ENCOUNTER
Called patient back in regards to disability paperwork  I confirmed that I have received the paperwork and completed it   I advised the patient I did fax it over to [facility]

## 2020-01-20 NOTE — TELEPHONE ENCOUNTER
Patient states his insurance company faxed over paperwork last week for extended disability relief  Please verify this paperwork is received      Callback #[phone]

## 2020-01-22 ENCOUNTER — OFFICE VISIT (OUTPATIENT)
Dept: PHYSICAL THERAPY | Facility: REHABILITATION | Age: 60
End: 2020-01-22
Payer: COMMERCIAL

## 2020-01-22 DIAGNOSIS — S46.212D BICEPS RUPTURE, PROXIMAL, LEFT, SUBSEQUENT ENCOUNTER: Primary | ICD-10-CM

## 2020-01-22 PROCEDURE — 97110 THERAPEUTIC EXERCISES: CPT | Performed by: PHYSICAL THERAPIST

## 2020-01-22 PROCEDURE — 97140 MANUAL THERAPY 1/> REGIONS: CPT | Performed by: PHYSICAL THERAPIST

## 2020-01-22 PROCEDURE — 97112 NEUROMUSCULAR REEDUCATION: CPT | Performed by: PHYSICAL THERAPIST

## 2020-01-22 NOTE — PROGRESS NOTES
Daily Note     Today's date: 2020  Patient name: Radhika Cooper  : 1960  MRN: [id number]  Referring provider: Lia Brito MD  Dx:   Encounter Diagnosis     ICD-10-CM    1  Biceps rupture, proximal, left, subsequent encounter S46 870D                   Subjective: No bicep or shoulder pain      Objective: See treatment diary below      Assessment: Strength progressing well      Plan: Continue per plan of care        Precautions: SEE PROTOCOL      Manual  12/26 1/15 1/22          L elbow/forearm PROM 20' 10' 5          L shld PROM  5' 5'                                                     Exercise Diary  12/26 1/15 1/22          Elbow flex/ext arom 20 30 NP          Forearm pro/sup arom elbow straight/flexed 90 20 Elbow 90 cane 2x15 2x15          Elbow flexion MRE  2x15 2x15          S/L ER&FLEX  2x10 3# 2x15          Supine tricep ext   3# 2x15                                                                                                                                                                                                                 Modalities

## 2020-01-23 ENCOUNTER — HOSPITAL ENCOUNTER (OUTPATIENT)
Dept: RADIOLOGY | Facility: HOSPITAL | Age: 60
Discharge: HOME | End: 2020-01-23
Payer: COMMERCIAL

## 2020-01-23 ENCOUNTER — OFFICE VISIT (OUTPATIENT)
Dept: OBGYN CLINIC | Facility: HOSPITAL | Age: 60
End: 2020-01-23
Payer: COMMERCIAL

## 2020-01-23 VITALS
BODY MASS INDEX: 33.86 KG/M2 | HEIGHT: 72 IN | HEART RATE: 80 BPM | DIASTOLIC BLOOD PRESSURE: 88 MMHG | WEIGHT: 250 LBS | SYSTOLIC BLOOD PRESSURE: 139 MMHG

## 2020-01-23 DIAGNOSIS — M25.561 RIGHT KNEE PAIN, UNSPECIFIED CHRONICITY: Primary | ICD-10-CM

## 2020-01-23 DIAGNOSIS — M25.561 RIGHT KNEE PAIN, UNSPECIFIED CHRONICITY: ICD-10-CM

## 2020-01-23 DIAGNOSIS — M17.11 PRIMARY OSTEOARTHRITIS OF RIGHT KNEE: ICD-10-CM

## 2020-01-23 PROCEDURE — 20610 DRAIN/INJ JOINT/BURSA W/O US: CPT | Performed by: PHYSICIAN ASSISTANT

## 2020-01-23 PROCEDURE — 73562 X-RAY EXAM OF KNEE 3: CPT

## 2020-01-23 PROCEDURE — 99213 OFFICE O/P EST LOW 20 MIN: CPT | Performed by: PHYSICIAN ASSISTANT

## 2020-01-23 RX ORDER — BUPIVACAINE HYDROCHLORIDE 5 MG/ML
2 INJECTION, SOLUTION EPIDURAL; INTRACAUDAL
Status: COMPLETED | OUTPATIENT
Start: 2020-01-23 | End: 2020-01-23

## 2020-01-23 RX ORDER — LIDOCAINE HYDROCHLORIDE 10 MG/ML
2 INJECTION, SOLUTION INFILTRATION; PERINEURAL
Status: COMPLETED | OUTPATIENT
Start: 2020-01-23 | End: 2020-01-23

## 2020-01-23 RX ORDER — TRIAMCINOLONE ACETONIDE 40 MG/ML
80 INJECTION, SUSPENSION INTRA-ARTICULAR; INTRAMUSCULAR
Status: COMPLETED | OUTPATIENT
Start: 2020-01-23 | End: 2020-01-23

## 2020-01-23 RX ADMIN — BUPIVACAINE HYDROCHLORIDE 2 ML: 5 INJECTION, SOLUTION EPIDURAL; INTRACAUDAL at 08:44

## 2020-01-23 RX ADMIN — LIDOCAINE HYDROCHLORIDE 2 ML: 10 INJECTION, SOLUTION INFILTRATION; PERINEURAL at 08:44

## 2020-01-23 RX ADMIN — TRIAMCINOLONE ACETONIDE 80 MG: 40 INJECTION, SUSPENSION INTRA-ARTICULAR; INTRAMUSCULAR at 08:44

## 2020-01-23 NOTE — PROGRESS NOTES
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  The pain is in the medial knee  The pain is dull in character, mild in severity, pain does not radiate and is not associated with numbness          The following portions of the patient's history were reviewed and updated as appropriate: allergies, current medications, past family history, past medical history, past social history, past surgical history and problem list     Review of Systems  Ortho Exam  Past Medical History:   Diagnosis Date    Anxiety     Arthritis     Chronic GERD     Hypertension     Peripheral neuropathy      Past Surgical History:   Procedure Laterality Date    BACK SURGERY      lumbar 4-5    ELBOW SURGERY Right     Removal chips    JOINT REPLACEMENT Bilateral     Hip    JOINT REPLACEMENT Left     Knee    KNEE ARTHROSCOPY Left     CT REINSERT BI/TRICEPS TENDON,DISTAL Left 12/4/2019    Procedure: DISTAL BICEPS TENDON REPAIR;  Surgeon: Satnam Alvarado MD;  Location: [facility];  Service: Orthopedics    CT TOTAL HIP ARTHROPLASTY Right 1/12/2016    Procedure: ARTHROPLASTY HIP TOTAL ANTERIOR;  Surgeon: Shari High DO;  [facility];  Service: Orthopedics    CT TOTAL KNEE ARTHROPLASTY Left 12/12/2016    Procedure: ARTHROPLASTY KNEE TOTAL;  Surgeon: Debra Painter MD;  [facility];  Service: Orthopedics     Family History   Problem Relation Age of Onset    Hypertension Mother      Social History     Occupational History    Not on file   Tobacco Use    Smoking status: Light Tobacco Smoker    Smokeless tobacco: Never Used    Tobacco comment: social smoker   Substance and Sexual Activity    Alcohol use: Yes     Frequency: 2-4 times a month     Drinks per session: 1 or 2     Binge frequency: Never     Comment: socially    Drug use: No    Sexual activity: Not on file       Review of Systems   Constitutional: Negative  HENT: Negative  Eyes: Negative  Respiratory: Negative  Cardiovascular: Negative  Gastrointestinal: Negative  Endocrine: Negative  Genitourinary: Negative  Musculoskeletal: As below      Allergic/Immunologic: Negative  Neurological: Negative  Hematological: Negative  Psychiatric/Behavioral: Negative  Objective   Physical Exam      · Constitutional: Awake, Alert, Oriented  · Eyes: EOMI  · Psych: Mood and affect appropriate  · Heart: regular rate and rhythm  · Lungs: No audible wheezing  · Abdomen: soft  · Lymph: no lymphedema   right Knee:  - Appearance   No swelling, discoloration, deformity, or ecchymosis  - Effusion   moderate  - Palpation   + Tenderness medial joint line  No tenderness of the patella, patellar tendon, pes anserine, lateral joint line, MCL, LCL, medial / lateral plateau  - ROM   Extension: 0 and Flexion: 130  - Special Tests   Ry's Test negative, Lachman's Test negative, Anterior Drawer Test negative, Posterior Drawer Test negative, Valgus Stress Test negative, Varus Stress Test negative and Patellar apprehension negative  - Motor   normal 5/5 in all planes  - NVI distally    I have personally reviewed pertinent films in PACS and my interpretation is medial joint space narrowing  patellar spurring       Large joint arthrocentesis: R knee  Date/Time: 1/23/2020 8:44 AM  Consent given by: patient  Site marked: site marked  Timeout: Immediately prior to procedure a time out was called to verify the correct patient, procedure, equipment, support staff and site/side marked as required   Supporting Documentation  Indications: pain   Procedure Details  Location: knee - R knee  Needle size: 22 G  Ultrasound guidance: no  Approach: lateral  Medications administered: 2 mL bupivacaine (PF) 0 5 %; 2 mL lidocaine 1 %; 80 mg triamcinolone acetonide 40 mg/mL    Aspirate amount: 30 mL  Aspirate: clear and yellow    Patient tolerance: patient tolerated the procedure well with no immediate complications  Dressing:  Sterile dressing applied

## 2020-01-27 ENCOUNTER — TELEPHONE (OUTPATIENT)
Dept: OBGYN CLINIC | Facility: HOSPITAL | Age: 60
End: 2020-01-27

## 2020-01-27 NOTE — TELEPHONE ENCOUNTER
I spoke to patient and advised that it will take another week or so for steroid to get into joint and decrease inflammation  In the meantime tylenol alternating with NSAIDS (If Dr Jin Wilder will allow, patient is checking), RICE method  He will call in a week if no improvement for a sooner appt with Dr Julián Menjivar

## 2020-01-27 NOTE — TELEPHONE ENCOUNTER
Dr Pena  #: [phone]         Patient is calling in requesting a call back  He was seen on 1/23 and received a cortisone Injection with Juwan Mesa  He states he is experiencing a lot of pain and discomfort  Patient will like to know what can he do?  Please advise, thank you

## 2020-01-29 ENCOUNTER — OFFICE VISIT (OUTPATIENT)
Dept: PHYSICAL THERAPY | Facility: REHABILITATION | Age: 60
End: 2020-01-29
Payer: COMMERCIAL

## 2020-01-29 DIAGNOSIS — S46.212D BICEPS RUPTURE, PROXIMAL, LEFT, SUBSEQUENT ENCOUNTER: Primary | ICD-10-CM

## 2020-01-29 PROCEDURE — 97110 THERAPEUTIC EXERCISES: CPT | Performed by: PHYSICAL THERAPIST

## 2020-01-29 PROCEDURE — 97140 MANUAL THERAPY 1/> REGIONS: CPT | Performed by: PHYSICAL THERAPIST

## 2020-01-29 PROCEDURE — 97112 NEUROMUSCULAR REEDUCATION: CPT | Performed by: PHYSICAL THERAPIST

## 2020-01-29 NOTE — PROGRESS NOTES
Daily Note     Today's date: 2020  Patient name: Chelsey Sotelo  : 1960  MRN: [id number]  Referring provider: Jovany Estrada MD  Dx:   Encounter Diagnosis     ICD-10-CM    1  Biceps rupture, proximal, left, subsequent encounter S46 626D                   Subjective: No new complaints  Feels he is getting stronger/       Objective: See treatment diary below      Assessment: Tolerated treatment well  Patient demonstrated fatigue post treatment      Plan: Continue per plan of care        Precautions: SEE PROTOCOL      Manual  12/26 1/15 1/22 1/29         L elbow/forearm PROM 20' 10' 5 5'         L shld PROM  5' 5' 5'                                                    Exercise Diary  12/26 1/15 1/22 1          Elbow flex/ext arom 20 30 NP          Forearm pro/sup arom elbow straight/flexed 90 20 Elbow 90 cane 2x15 2x15 Cane 1# 2x15         Elbow flexion MRE  2x15 2x15 3x15         S/L ER&FLEX  2x10 3# 2x15 3# 2x15         Supine tricep ext   3# 2x15 3# 3x15                                                                                                                                                                                                                Modalities

## 2020-02-05 ENCOUNTER — APPOINTMENT (OUTPATIENT)
Dept: PHYSICAL THERAPY | Facility: REHABILITATION | Age: 60
End: 2020-02-05
Payer: COMMERCIAL

## 2020-02-10 RX ORDER — TRIAMTERENE AND HYDROCHLOROTHIAZIDE 37.5; 25 MG/1; MG/1
1 TABLET ORAL DAILY
COMMUNITY
Start: 2020-01-14

## 2020-02-10 RX ORDER — AMLODIPINE BESYLATE 10 MG/1
10 TABLET ORAL DAILY
COMMUNITY
Start: 2020-01-14

## 2020-02-11 ENCOUNTER — OFFICE VISIT (OUTPATIENT)
Dept: OBGYN CLINIC | Facility: HOSPITAL | Age: 60
End: 2020-02-11
Payer: COMMERCIAL

## 2020-02-11 VITALS
HEART RATE: 89 BPM | HEIGHT: 72 IN | DIASTOLIC BLOOD PRESSURE: 72 MMHG | SYSTOLIC BLOOD PRESSURE: 125 MMHG | WEIGHT: 252.6 LBS | BODY MASS INDEX: 34.21 KG/M2

## 2020-02-11 DIAGNOSIS — G89.29 CHRONIC PAIN OF RIGHT KNEE: Primary | ICD-10-CM

## 2020-02-11 DIAGNOSIS — M70.51 PES ANSERINUS BURSITIS OF RIGHT KNEE: ICD-10-CM

## 2020-02-11 DIAGNOSIS — M17.11 PRIMARY OSTEOARTHRITIS OF RIGHT KNEE: ICD-10-CM

## 2020-02-11 DIAGNOSIS — M25.561 CHRONIC PAIN OF RIGHT KNEE: Primary | ICD-10-CM

## 2020-02-11 PROCEDURE — 99213 OFFICE O/P EST LOW 20 MIN: CPT | Performed by: ORTHOPAEDIC SURGERY

## 2020-02-12 ENCOUNTER — OFFICE VISIT (OUTPATIENT)
Dept: PHYSICAL THERAPY | Facility: REHABILITATION | Age: 60
End: 2020-02-12
Payer: COMMERCIAL

## 2020-02-12 DIAGNOSIS — S46.212D BICEPS RUPTURE, PROXIMAL, LEFT, SUBSEQUENT ENCOUNTER: Primary | ICD-10-CM

## 2020-02-12 PROCEDURE — 97110 THERAPEUTIC EXERCISES: CPT | Performed by: PHYSICAL THERAPIST

## 2020-02-12 PROCEDURE — 97140 MANUAL THERAPY 1/> REGIONS: CPT | Performed by: PHYSICAL THERAPIST

## 2020-02-12 NOTE — PROGRESS NOTES
Daily Note     Today's date: 2020  Patient name: Nevaeh Johns  : 1960  MRN: [id number]  Referring provider: Nicanor Brooks MD  Dx:   Encounter Diagnosis     ICD-10-CM    1  Biceps rupture, proximal, left, subsequent encounter S46 382U                   Subjective: Reports only tightness - to MD in 2 weeks for possible release  Objective: See treatment diary below      Assessment: Able to perform 18# box lifts without difficulty    Plan: Continue per plan of care        Precautions: SEE PROTOCOL      Manual  12/26 1/15 1/22 1/29 2/12        L elbow/forearm PROM 20' 10' 5 5' 5'        L shld PROM  5' 5' 5' 5'                                                   Exercise Diary  12/26 1/15 1/22 1 29 2/12        Elbow flex/ext arom 20 30 NP          Forearm pro/sup arom elbow straight/flexed 90 20 Elbow 90 cane 2x15 2x15 Cane 1# 2x15 Cane 1# 2x15        Elbow flexion MRE  2x15 2x15 3x15 3x15        S/L ER&FLEX  2x10 3# 2x15 3# 2x15 3# 2x15        Supine tricep ext   3# 2x15 3# 3x15 5# 2x15        Box lifts     18# 2x15                                                                                                                                                                                                  Modalities

## 2020-02-13 NOTE — PROGRESS NOTES
PT Re-Evaluation     Today's date: 2020  Patient name: Yakelin Beavers  : 1960  MRN: [id number]  Referring provider: Maverick Meyers MD  Dx:   Encounter Diagnosis     ICD-10-CM    1  Biceps rupture, proximal, left, subsequent encounter S46 212D        Start Time: 1040  Stop Time: 1105  Total time in clinic (min): 25 minutes    Assessment  Assessment details: Patient progressing well towards established goals  Will benefit from skilled PT 2 additional visits then likely d/c to HEP  Patient likely to resume unrestricted work function in 2-3 weeks  Impairments: activity intolerance, impaired physical strength and pain with function  Functional limitations: OOW; Restricted IADL function  Symptom irritability: lowUnderstanding of Dx/Px/POC: good   Prognosis: good    Goals  Short Term goals - 4 weeks  1  Patient will be independent and compliant with a HEP  MET  2  Patient will report a 50% decrease in pain complaints  MET    Long Term goals - 8 weeks  1  Patient will report elimination of pain complaints  PARTIally meT  2  Patient will return to all work related activities without restriction  NOT MET  3  Patient will return to all recreational activities without restriction    NOT MET    Plan  Patient would benefit from: skilled physical therapy  Planned modality interventions: cryotherapy  Planned therapy interventions: manual therapy, joint mobilization, neuromuscular re-education, patient education, postural training, therapeutic exercise and home exercise program  Frequency: 1x week  Duration in visits: 2  Duration in weeks: 2  Treatment plan discussed with: patient        Subjective Evaluation    History of Present Illness  Mechanism of injury: Mild stiffness persists          Not a recurrent problem   Pain  Current pain ratin  At best pain ratin  At worst pain ratin  Location: L elbow  Quality: discomfort and tight  Aggravating factors: lifting  Progression: improved    Social Support    Employment status: not working  Treatments  Current treatment: physical therapy  Patient Goals  Patient goals for therapy: decreased pain, increased motion, increased strength, independence with ADLs/IADLs, return to sport/leisure activities and return to work          Objective     Active Range of Motion     Left Elbow   Normal active range of motion    Right Elbow   Normal active range of motion    Strength/Myotome Testing     Left Shoulder     Planes of Motion   Flexion: 5   Abduction: 5   External rotation at 0°: 4   Internal rotation at 0°: 4+     Left Elbow   Flexion: 4+  Extension: 5  Forearm supination: 4  Forearm pronation: 5    Right Elbow   Normal strength

## 2020-02-19 ENCOUNTER — OFFICE VISIT (OUTPATIENT)
Dept: PHYSICAL THERAPY | Facility: REHABILITATION | Age: 60
End: 2020-02-19
Payer: COMMERCIAL

## 2020-02-19 DIAGNOSIS — S46.212D BICEPS RUPTURE, PROXIMAL, LEFT, SUBSEQUENT ENCOUNTER: Primary | ICD-10-CM

## 2020-02-19 PROCEDURE — 97110 THERAPEUTIC EXERCISES: CPT | Performed by: PHYSICAL THERAPIST

## 2020-02-19 PROCEDURE — 97140 MANUAL THERAPY 1/> REGIONS: CPT | Performed by: PHYSICAL THERAPIST

## 2020-02-19 NOTE — PROGRESS NOTES
PT Discharge    Today's date: 2020  Patient name: Devin Lloyd  : 1960  MRN: [id number]  Referring provider: Yin Malave MD  Dx:   Encounter Diagnosis     ICD-10-CM    1  Biceps rupture, proximal, left, subsequent encounter S46 556J                   Assessment  Assessment details: All formal PT goals have been achieved  Patient capable of return to full unrestricted activity  Functional limitations: NoneUnderstanding of Dx/Px/POC: good   Prognosis: good    Goals  Short Term goals - 4 weeks  1  Patient will be independent and compliant with a HEP  MET  2  Patient will report a 50% decrease in pain complaints  MET    Long Term goals - 8 weeks  1  Patient will report elimination of pain complaints  MET  2  Patient will return to all work related activities without restriction  MET  3  Patient will return to all recreational activities without restriction    MET      Plan  Planned therapy interventions: home exercise program  Treatment plan discussed with: patient        Subjective Evaluation    Pain  Current pain ratin  At best pain ratin  At worst pain ratin  Progression: improved    Treatments  Current treatment: physical therapy  Patient Goals  Patient goals for therapy: decreased pain, increased motion, increased strength, independence with ADLs/IADLs, return to sport/leisure activities and return to work          Objective     Active Range of Motion     Left Elbow   Normal active range of motion    Right Elbow   Normal active range of motion    Strength/Myotome Testing     Left Elbow   Normal strength    Right Elbow   Normal strength      Flowsheet Rows      Most Recent Value   PT/OT G-Codes   Current Score  69   Projected Score  65             Manual  12/26 1/15 1/22 1/29 2/19            L elbow/forearm PROM 20' 10' 5 5' 5'             L shld PROM   5' 5' 5' 5'                                                                                           Exercise Diary   1/15 1/22 1 29 2/12             Elbow flex/ext arom 20 30 NP                 Forearm pro/sup arom elbow straight/flexed 90 20 Elbow 90 cane 2x15 2x15 Cane 1# 2x15 Cane 1# 2x15             Elbow flexion MRE   2x15 2x15 3x15 3x15             S/L ER&FLEX   2x10 3# 2x15 3# 2x15 3# 2x15             Supine tricep ext     3# 2x15 3# 3x15 5# 2x15             Box lifts         18# 2x15                                                                                                                                                                                                                                                                                                                                                                   Modalities

## 2020-02-20 ENCOUNTER — TELEPHONE (OUTPATIENT)
Dept: OBGYN CLINIC | Facility: HOSPITAL | Age: 60
End: 2020-02-20

## 2020-02-20 NOTE — TELEPHONE ENCOUNTER
Patient sees Dr Nathalia Rogers  Patient is calling because at his last appointment he stated he spoke with Dr Nathalia Rogers about possible surgery and now he wants to move forward with it  He wanted to know since he was briefly discussed can he set this up or does he want to bring him back in for another appointment to discuss further?     CB: [phone]

## 2020-02-24 ENCOUNTER — OFFICE VISIT (OUTPATIENT)
Dept: OBGYN CLINIC | Facility: HOSPITAL | Age: 60
End: 2020-02-24

## 2020-02-24 VITALS
HEIGHT: 72 IN | SYSTOLIC BLOOD PRESSURE: 138 MMHG | WEIGHT: 254 LBS | DIASTOLIC BLOOD PRESSURE: 86 MMHG | HEART RATE: 71 BPM | BODY MASS INDEX: 34.4 KG/M2

## 2020-02-24 DIAGNOSIS — Z47.89 AFTERCARE FOLLOWING SURGERY OF THE MUSCULOSKELETAL SYSTEM: Primary | ICD-10-CM

## 2020-02-24 PROCEDURE — 99024 POSTOP FOLLOW-UP VISIT: CPT | Performed by: ORTHOPAEDIC SURGERY

## 2020-02-24 NOTE — PROGRESS NOTES
Assessment  Diagnoses and all orders for this visit:    Aftercare following surgery of the musculoskeletal system    S/P Left Distal Biceps Repair    Discussion and Plan:  62 y/o male status post left distal biceps repair 12/4/19  Virgilio Stubbs continues to make progress postoperatively  From an orthopedics standpoint he may return to work full duty  I did advise him to use caution as he does lift repetitive weight for work  Patient states he will have a helper when he returns to help him lift the weight  Patient was also counseled that if he starts to experience biceps pain on the right side he should come into the office for a referral to physical therapy  He may follow up with me as-needed basis    Subjective:   Patient ID: Kitty Kovacs is a 61 y o  male      HPI    Patient presents the office today for his 3rd postoperative visit status post left distal biceps tendon repair  Date of surgery 12/04/2019  Patient states he is making progress  He continues with physical therapy  Patient states he is lifting weight in therapy is overall feeling well today  He  denies any pain about the shoulder or elbow  He denies any new injuries  He does note numbness to the incision area in the antecubital fossa, however this is improving  He denies any distal paresthesias  He is inquiring about return to work  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  He has not returned to work at this time  The following portions of the patient's history were reviewed and updated as appropriate: allergies, current medications, past family history, past medical history, past social history, past surgical history and problem list     Review of Systems   Constitutional: Negative for chills, fever and unexpected weight change  HENT: Negative for hearing loss, nosebleeds and sore throat  Eyes: Negative for pain, redness and visual disturbance     Respiratory: Negative for cough, shortness of breath and wheezing  Cardiovascular: Negative for chest pain, palpitations and leg swelling  Gastrointestinal: Negative for abdominal pain, nausea and vomiting  Endocrine: Negative for polydipsia and polyuria  Genitourinary: Negative for dysuria and hematuria  Musculoskeletal: Negative for arthralgias, joint swelling and myalgias  Skin: Negative for rash and wound  Neurological: Negative for dizziness, numbness and headaches  Psychiatric/Behavioral: Negative for agitation, decreased concentration and suicidal ideas  Objective:  Ht 6' (1 829 m)   BMI 34 26 kg/m²       Left Elbow Exam     Tenderness   The patient is experiencing no tenderness  Range of Motion   The patient has normal left elbow ROM  Other   Erythema: absent  Scars: present  Sensation: normal  Pulse: present    Comments:  Incision well healed   Palpable biceps tendon  Full range of motion of the elbow and shoulder noted            Physical Exam   Constitutional: He is oriented to person, place, and time  He appears well-developed  HENT:   Head: Normocephalic and atraumatic  Eyes: Conjunctivae are normal    Neck: Neck supple  Cardiovascular: Intact distal pulses  Pulmonary/Chest: Effort normal    Neurological: He is alert and oriented to person, place, and time  Skin: Skin is warm and dry  Psychiatric: He has a normal mood and affect  His behavior is normal    Vitals reviewed  I have personally reviewed pertinent films in PACS and my interpretation is as follows    No new images to review     Scribe Attestation    I,:   Angie Butt MA am acting as a scribe while in the presence of the attending physician :        I,:   Finn Holder MD personally performed the services described in this documentation    as scribed in my presence :

## 2020-03-10 ENCOUNTER — OFFICE VISIT (OUTPATIENT)
Dept: OBGYN CLINIC | Facility: HOSPITAL | Age: 60
End: 2020-03-10
Payer: COMMERCIAL

## 2020-03-10 VITALS
DIASTOLIC BLOOD PRESSURE: 75 MMHG | BODY MASS INDEX: 34 KG/M2 | SYSTOLIC BLOOD PRESSURE: 137 MMHG | HEART RATE: 91 BPM | WEIGHT: 251 LBS | HEIGHT: 72 IN

## 2020-03-10 DIAGNOSIS — M17.11 PRIMARY OSTEOARTHRITIS OF RIGHT KNEE: ICD-10-CM

## 2020-03-10 DIAGNOSIS — G89.29 CHRONIC PAIN OF RIGHT KNEE: Primary | ICD-10-CM

## 2020-03-10 DIAGNOSIS — M25.561 CHRONIC PAIN OF RIGHT KNEE: Primary | ICD-10-CM

## 2020-03-10 PROCEDURE — 99214 OFFICE O/P EST MOD 30 MIN: CPT | Performed by: ORTHOPAEDIC SURGERY

## 2020-03-10 RX ORDER — FOLIC ACID 1 MG/1
1 TABLET ORAL DAILY
Qty: 30 TABLET | Refills: 0 | Status: SHIPPED | OUTPATIENT
Start: 2020-03-10 | End: 2020-04-17

## 2020-03-10 RX ORDER — FERROUS SULFATE TAB EC 324 MG (65 MG FE EQUIVALENT) 324 (65 FE) MG
324 TABLET DELAYED RESPONSE ORAL
Qty: 30 TABLET | Refills: 0 | Status: ON HOLD | OUTPATIENT
Start: 2020-03-10 | End: 2020-06-01 | Stop reason: ALTCHOICE

## 2020-03-10 RX ORDER — ASCORBIC ACID 500 MG
500 TABLET ORAL DAILY
Qty: 30 TABLET | Refills: 0 | Status: ON HOLD | OUTPATIENT
Start: 2020-03-10 | End: 2020-06-01 | Stop reason: ALTCHOICE

## 2020-03-10 RX ORDER — GABAPENTIN 300 MG/1
300 CAPSULE ORAL ONCE
Status: CANCELLED | OUTPATIENT
Start: 2020-03-10 | End: 2020-03-10

## 2020-03-10 RX ORDER — CHLORHEXIDINE GLUCONATE 0.12 MG/ML
15 RINSE ORAL ONCE
Status: CANCELLED | OUTPATIENT
Start: 2020-03-10 | End: 2020-03-10

## 2020-03-10 RX ORDER — ACETAMINOPHEN 325 MG/1
975 TABLET ORAL ONCE
Status: CANCELLED | OUTPATIENT
Start: 2020-03-10 | End: 2020-03-10

## 2020-03-10 NOTE — PROGRESS NOTES
Subjective;   14-year-old male patient with history of osteoarthritis of the right knee  He also has a history of a successful left total knee done previously  He is finding that periodic injections are providing him no help  He is interested in discussing elective right total knee replacement arthroplasty  Past Medical History:   Diagnosis Date    Anxiety     Arthritis     Chronic GERD     Hypertension     Peripheral neuropathy        Past Surgical History:   Procedure Laterality Date    BACK SURGERY      lumbar 4-5    ELBOW SURGERY Right     Removal chips    JOINT REPLACEMENT Bilateral     Hip    JOINT REPLACEMENT Left     Knee    KNEE ARTHROSCOPY Left     FL REINSERT BI/TRICEPS TENDON,DISTAL Left 12/4/2019    Procedure: DISTAL BICEPS TENDON REPAIR;  Surgeon: Kayla Da Silva MD;  [facility];  Service: Orthopedics    FL TOTAL HIP ARTHROPLASTY Right 1/12/2016    Procedure: ARTHROPLASTY HIP TOTAL ANTERIOR;  Surgeon: Spencer Vines DO;  [facility];  Service: Orthopedics    FL TOTAL KNEE ARTHROPLASTY Left 12/12/2016    Procedure: ARTHROPLASTY KNEE TOTAL;  Surgeon: Emre Parnell MD;  [facility];  Service: Orthopedics       Family History   Problem Relation Age of Onset    Hypertension Mother        Social History     Tobacco Use    Smoking status: Light Tobacco Smoker    Smokeless tobacco: Never Used    Tobacco comment: social smoker   Substance Use Topics    Alcohol use: Yes     Frequency: 2-4 times a month     Drinks per session: 1 or 2     Binge frequency: Never     Comment: socially    Drug use: No     Exam;    Adult male in no acute distress  HEENT; NC/AT  Neck; no bruits  Chest; CTA  CVS; RRR  Abdomen; soft nontender,    Musculoskeletal;   has a healed vertical incision overlying the left knee  He has the absence of knee pain since undergoing left total knee replacement    His right knee has proliferation of the subcutaneous tissues of the suprapatellar pouch  He has predictable medial greater than lateral joint line pain and genu varum by inspection  X-rays;   right knee series, shows tricompartmental osteoarthritic changes most severe patellofemoral to a lesser extent medial compartment and then lateral compartment    Impression; History of left total knee replacement  Right knee osteoarthritis  Escalating right knee pain    Plan; He was offered and considered an appropriate candidate for right total knee replacement by the attending surgeon    The perioperative period was discussed in detail despite him previously undergoing left total knee replacement  Pending approval by the surgical optimization committee, he will next be seen in the office as a postoperative patient

## 2020-03-11 ENCOUNTER — TELEPHONE (OUTPATIENT)
Dept: OBGYN CLINIC | Facility: CLINIC | Age: 60
End: 2020-03-11

## 2020-03-20 ENCOUNTER — TELEPHONE (OUTPATIENT)
Dept: OBGYN CLINIC | Facility: HOSPITAL | Age: 60
End: 2020-03-20

## 2020-03-20 NOTE — TELEPHONE ENCOUNTER
Your message reviewed    Clinically he has surpassed the benefit of Hyluronic Acid,    Said procedure is still the recommended treatment of choice    You may inform the patient

## 2020-03-20 NOTE — TELEPHONE ENCOUNTER
I left patient a detailed msg on his personal voicemail  Asked him to call back with any further questions

## 2020-03-20 NOTE — TELEPHONE ENCOUNTER
Paresh Palatine  [phone]    Dr López Chester    Patient is asking if injection of Hydrochloric Acid would be a better option than surgery  Please advise patient

## 2020-03-24 DIAGNOSIS — M17.11 PRIMARY OSTEOARTHRITIS OF RIGHT KNEE: ICD-10-CM

## 2020-03-24 RX ORDER — LORATADINE 10 MG
TABLET ORAL
Qty: 90 TABLET | Refills: 1 | OUTPATIENT
Start: 2020-03-24

## 2020-03-24 RX ORDER — FOLIC ACID 1 MG/1
1 TABLET ORAL DAILY
Qty: 90 TABLET | Refills: 1 | OUTPATIENT
Start: 2020-03-24 | End: 2020-04-23

## 2020-03-24 RX ORDER — FERROUS SULFATE TAB EC 324 MG (65 MG FE EQUIVALENT) 324 (65 FE) MG
324 TABLET DELAYED RESPONSE ORAL
Qty: 90 TABLET | Refills: 1 | OUTPATIENT
Start: 2020-03-24 | End: 2020-04-23

## 2020-03-25 DIAGNOSIS — M17.11 PRIMARY OSTEOARTHRITIS OF RIGHT KNEE: ICD-10-CM

## 2020-03-26 RX ORDER — FOLIC ACID 1 MG/1
1 TABLET ORAL DAILY
Qty: 90 TABLET | Refills: 1 | OUTPATIENT
Start: 2020-03-26 | End: 2020-04-25

## 2020-03-26 RX ORDER — FERROUS SULFATE TAB EC 324 MG (65 MG FE EQUIVALENT) 324 (65 FE) MG
324 TABLET DELAYED RESPONSE ORAL
Qty: 90 TABLET | Refills: 1 | OUTPATIENT
Start: 2020-03-26 | End: 2020-04-25

## 2020-03-26 RX ORDER — LORATADINE 10 MG
TABLET ORAL
Qty: 90 TABLET | Refills: 1 | OUTPATIENT
Start: 2020-03-26

## 2020-03-30 ENCOUNTER — DOCUMENTATION (OUTPATIENT)
Dept: OBGYN CLINIC | Facility: HOSPITAL | Age: 60
End: 2020-03-30

## 2020-03-30 NOTE — PROGRESS NOTES
Called and spoke with patient 3/27/20- informed him that insurance policy needs patient to be smoke free for 60 days prior to scheduled elective surgery  Patient informed me that they just recently quit smoking (2 of weeks) ago  Quit date :3/6/2020    I did let patient know that as of now the surgery date of (05/11/2020) is going to be postponed to give adequate time of being smoke free  The new surgery date is ___6/01/2020_and is scheduled with Dr Elizabeth Found  I will follow up with patient in about 2 months to follow up on this  At the same time we discussed his current BMI of 34 04 and we implemented various daily activities to lower his BMI before surgery

## 2020-04-17 DIAGNOSIS — M17.11 PRIMARY OSTEOARTHRITIS OF RIGHT KNEE: ICD-10-CM

## 2020-04-17 RX ORDER — FOLIC ACID 1 MG/1
1 TABLET ORAL DAILY
Qty: 90 TABLET | Refills: 1 | Status: ON HOLD | OUTPATIENT
Start: 2020-04-17 | End: 2020-06-01 | Stop reason: ALTCHOICE

## 2020-04-17 RX ORDER — FOLIC ACID 1 MG/1
1 TABLET ORAL DAILY
Qty: 30 TABLET | Refills: 0 | Status: SHIPPED | OUTPATIENT
Start: 2020-04-17 | End: 2020-04-17

## 2020-04-27 ENCOUNTER — TELEPHONE (OUTPATIENT)
Dept: OBGYN CLINIC | Facility: HOSPITAL | Age: 60
End: 2020-04-27

## 2020-04-28 ENCOUNTER — TELEPHONE (OUTPATIENT)
Dept: OBGYN CLINIC | Facility: CLINIC | Age: 60
End: 2020-04-28

## 2020-04-29 ENCOUNTER — APPOINTMENT (OUTPATIENT)
Dept: LAB | Age: 60
End: 2020-04-29
Payer: COMMERCIAL

## 2020-04-29 ENCOUNTER — TRANSCRIBE ORDERS (OUTPATIENT)
Dept: ADMINISTRATIVE | Age: 60
End: 2020-04-29

## 2020-04-29 DIAGNOSIS — Z01.818 ENCOUNTER FOR PREADMISSION TESTING: Primary | ICD-10-CM

## 2020-04-29 DIAGNOSIS — M17.11 PRIMARY OSTEOARTHRITIS OF RIGHT KNEE: ICD-10-CM

## 2020-04-29 LAB
ABO GROUP BLD: NORMAL
ALBUMIN SERPL BCP-MCNC: 4.2 G/DL (ref 3.5–5)
ALP SERPL-CCNC: 70 U/L (ref 46–116)
ALT SERPL W P-5'-P-CCNC: 49 U/L (ref 12–78)
ANION GAP SERPL CALCULATED.3IONS-SCNC: 6 MMOL/L (ref 4–13)
APTT PPP: 28 SECONDS (ref 23–37)
AST SERPL W P-5'-P-CCNC: 22 U/L (ref 5–45)
BASOPHILS # BLD AUTO: 0.12 THOUSANDS/ΜL (ref 0–0.1)
BASOPHILS NFR BLD AUTO: 2 % (ref 0–1)
BILIRUB SERPL-MCNC: 0.88 MG/DL (ref 0.2–1)
BLD GP AB SCN SERPL QL: NEGATIVE
BUN SERPL-MCNC: 18 MG/DL (ref 5–25)
CALCIUM SERPL-MCNC: 9.7 MG/DL (ref 8.3–10.1)
CHLORIDE SERPL-SCNC: 109 MMOL/L (ref 100–108)
CO2 SERPL-SCNC: 25 MMOL/L (ref 21–32)
CREAT SERPL-MCNC: 0.77 MG/DL (ref 0.6–1.3)
CRP SERPL QL: 7.2 MG/L
EOSINOPHIL # BLD AUTO: 0.2 THOUSAND/ΜL (ref 0–0.61)
EOSINOPHIL NFR BLD AUTO: 3 % (ref 0–6)
ERYTHROCYTE [DISTWIDTH] IN BLOOD BY AUTOMATED COUNT: 12.6 % (ref 11.6–15.1)
EST. AVERAGE GLUCOSE BLD GHB EST-MCNC: 120 MG/DL
FERRITIN SERPL-MCNC: 50 NG/ML (ref 8–388)
GFR SERPL CREATININE-BSD FRML MDRD: 99 ML/MIN/1.73SQ M
GLUCOSE P FAST SERPL-MCNC: 120 MG/DL (ref 65–99)
HBA1C MFR BLD: 5.8 %
HCT VFR BLD AUTO: 43 % (ref 36.5–49.3)
HGB BLD-MCNC: 14.9 G/DL (ref 12–17)
IMM GRANULOCYTES # BLD AUTO: 0.03 THOUSAND/UL (ref 0–0.2)
IMM GRANULOCYTES NFR BLD AUTO: 1 % (ref 0–2)
INR PPP: 0.99 (ref 0.84–1.19)
IRON SATN MFR SERPL: 19 %
IRON SERPL-MCNC: 90 UG/DL (ref 65–175)
LYMPHOCYTES # BLD AUTO: 1.86 THOUSANDS/ΜL (ref 0.6–4.47)
LYMPHOCYTES NFR BLD AUTO: 29 % (ref 14–44)
MCH RBC QN AUTO: 30 PG (ref 26.8–34.3)
MCHC RBC AUTO-ENTMCNC: 34.7 G/DL (ref 31.4–37.4)
MCV RBC AUTO: 87 FL (ref 82–98)
MONOCYTES # BLD AUTO: 0.72 THOUSAND/ΜL (ref 0.17–1.22)
MONOCYTES NFR BLD AUTO: 11 % (ref 4–12)
NEUTROPHILS # BLD AUTO: 3.44 THOUSANDS/ΜL (ref 1.85–7.62)
NEUTS SEG NFR BLD AUTO: 54 % (ref 43–75)
NRBC BLD AUTO-RTO: 0 /100 WBCS
PLATELET # BLD AUTO: 292 THOUSANDS/UL (ref 149–390)
PMV BLD AUTO: 10.7 FL (ref 8.9–12.7)
POTASSIUM SERPL-SCNC: 3.7 MMOL/L (ref 3.5–5.3)
PROT SERPL-MCNC: 7.5 G/DL (ref 6.4–8.2)
PROTHROMBIN TIME: 12.7 SECONDS (ref 11.6–14.5)
RBC # BLD AUTO: 4.96 MILLION/UL (ref 3.88–5.62)
RH BLD: POSITIVE
SODIUM SERPL-SCNC: 140 MMOL/L (ref 136–145)
SPECIMEN EXPIRATION DATE: NORMAL
TIBC SERPL-MCNC: 469 UG/DL (ref 250–450)
WBC # BLD AUTO: 6.37 THOUSAND/UL (ref 4.31–10.16)

## 2020-04-29 PROCEDURE — 86900 BLOOD TYPING SEROLOGIC ABO: CPT

## 2020-04-29 PROCEDURE — 86850 RBC ANTIBODY SCREEN: CPT

## 2020-04-29 PROCEDURE — 83550 IRON BINDING TEST: CPT

## 2020-04-29 PROCEDURE — 80053 COMPREHEN METABOLIC PANEL: CPT

## 2020-04-29 PROCEDURE — 85610 PROTHROMBIN TIME: CPT

## 2020-04-29 PROCEDURE — 36415 COLL VENOUS BLD VENIPUNCTURE: CPT | Performed by: PHYSICIAN ASSISTANT

## 2020-04-29 PROCEDURE — 86901 BLOOD TYPING SEROLOGIC RH(D): CPT

## 2020-04-29 PROCEDURE — 85025 COMPLETE CBC W/AUTO DIFF WBC: CPT

## 2020-04-29 PROCEDURE — 86140 C-REACTIVE PROTEIN: CPT

## 2020-04-29 PROCEDURE — 83036 HEMOGLOBIN GLYCOSYLATED A1C: CPT

## 2020-04-29 PROCEDURE — 83540 ASSAY OF IRON: CPT

## 2020-04-29 PROCEDURE — 85730 THROMBOPLASTIN TIME PARTIAL: CPT

## 2020-04-29 PROCEDURE — 82728 ASSAY OF FERRITIN: CPT

## 2020-05-06 ENCOUNTER — TELEPHONE (OUTPATIENT)
Dept: OBGYN CLINIC | Facility: HOSPITAL | Age: 60
End: 2020-05-06

## 2020-05-12 ENCOUNTER — PREP FOR PROCEDURE (OUTPATIENT)
Dept: OBGYN CLINIC | Facility: HOSPITAL | Age: 60
End: 2020-05-12

## 2020-05-12 ENCOUNTER — TELEPHONE (OUTPATIENT)
Dept: OBGYN CLINIC | Facility: HOSPITAL | Age: 60
End: 2020-05-12

## 2020-05-12 DIAGNOSIS — Z11.59 SCREENING FOR VIRAL DISEASE: Primary | ICD-10-CM

## 2020-05-12 DIAGNOSIS — M17.11 PRIMARY OSTEOARTHRITIS OF RIGHT KNEE: ICD-10-CM

## 2020-05-20 ENCOUNTER — TELEPHONE (OUTPATIENT)
Dept: OBGYN CLINIC | Facility: CLINIC | Age: 60
End: 2020-05-20

## 2020-05-21 DIAGNOSIS — Z91.89 AT RISK FOR OBSTRUCTIVE SLEEP APNEA: Primary | ICD-10-CM

## 2020-05-21 RX ORDER — AMOXICILLIN 500 MG
1 CAPSULE ORAL DAILY
COMMUNITY

## 2020-05-26 ENCOUNTER — TELEPHONE (OUTPATIENT)
Dept: OBGYN CLINIC | Facility: HOSPITAL | Age: 60
End: 2020-05-26

## 2020-05-26 DIAGNOSIS — Z11.59 SCREENING FOR VIRAL DISEASE: ICD-10-CM

## 2020-05-26 PROCEDURE — U0003 INFECTIOUS AGENT DETECTION BY NUCLEIC ACID (DNA OR RNA); SEVERE ACUTE RESPIRATORY SYNDROME CORONAVIRUS 2 (SARS-COV-2) (CORONAVIRUS DISEASE [COVID-19]), AMPLIFIED PROBE TECHNIQUE, MAKING USE OF HIGH THROUGHPUT TECHNOLOGIES AS DESCRIBED BY CMS-2020-01-R: HCPCS

## 2020-05-27 LAB — SARS-COV-2 RNA SPEC QL NAA+PROBE: NOT DETECTED

## 2020-06-01 ENCOUNTER — HOSPITAL ENCOUNTER (OUTPATIENT)
Facility: HOSPITAL | Age: 60
Setting detail: OUTPATIENT SURGERY
Discharge: HOME | End: 2020-06-02
Attending: ORTHOPAEDIC SURGERY | Admitting: ORTHOPAEDIC SURGERY
Payer: COMMERCIAL

## 2020-06-01 ENCOUNTER — ANESTHESIA EVENT (OUTPATIENT)
Dept: PERIOP | Facility: HOSPITAL | Age: 60
End: 2020-06-01
Payer: COMMERCIAL

## 2020-06-01 ENCOUNTER — ANESTHESIA (OUTPATIENT)
Dept: PERIOP | Facility: HOSPITAL | Age: 60
End: 2020-06-01
Payer: COMMERCIAL

## 2020-06-01 DIAGNOSIS — K21.9 GASTROESOPHAGEAL REFLUX DISEASE, ESOPHAGITIS PRESENCE NOT SPECIFIED: ICD-10-CM

## 2020-06-01 DIAGNOSIS — M17.11 PRIMARY OSTEOARTHRITIS OF RIGHT KNEE: ICD-10-CM

## 2020-06-01 DIAGNOSIS — Z96.651 S/P TKR (TOTAL KNEE REPLACEMENT), RIGHT: Primary | ICD-10-CM

## 2020-06-01 DIAGNOSIS — I10 HYPERTENSION, UNSPECIFIED TYPE: ICD-10-CM

## 2020-06-01 LAB
ABO GROUP BLD: NORMAL
BLD GP AB SCN SERPL QL: NEGATIVE
GLUCOSE SERPL-MCNC: 127 MG/DL (ref 65–140)
RH BLD: POSITIVE
SPECIMEN EXPIRATION DATE: NORMAL

## 2020-06-01 PROCEDURE — 97163 PT EVAL HIGH COMPLEX 45 MIN: CPT

## 2020-06-01 PROCEDURE — 27447 TOTAL KNEE ARTHROPLASTY: CPT | Performed by: ORTHOPAEDIC SURGERY

## 2020-06-01 PROCEDURE — C1776 JOINT DEVICE (IMPLANTABLE): HCPCS | Performed by: ORTHOPAEDIC SURGERY

## 2020-06-01 PROCEDURE — C1713 ANCHOR/SCREW BN/BN,TIS/BN: HCPCS | Performed by: ORTHOPAEDIC SURGERY

## 2020-06-01 PROCEDURE — 86850 RBC ANTIBODY SCREEN: CPT | Performed by: ORTHOPAEDIC SURGERY

## 2020-06-01 PROCEDURE — 86900 BLOOD TYPING SEROLOGIC ABO: CPT | Performed by: ORTHOPAEDIC SURGERY

## 2020-06-01 PROCEDURE — C9290 INJ, BUPIVACAINE LIPOSOME: HCPCS | Performed by: STUDENT IN AN ORGANIZED HEALTH CARE EDUCATION/TRAINING PROGRAM

## 2020-06-01 PROCEDURE — 99024 POSTOP FOLLOW-UP VISIT: CPT | Performed by: ORTHOPAEDIC SURGERY

## 2020-06-01 PROCEDURE — 82948 REAGENT STRIP/BLOOD GLUCOSE: CPT

## 2020-06-01 PROCEDURE — 86901 BLOOD TYPING SEROLOGIC RH(D): CPT | Performed by: ORTHOPAEDIC SURGERY

## 2020-06-01 DEVICE — ATTUNE KNEE SYSTEM TIBIAL INSERT FIXED BEARING POSTERIOR STABILIZED 7 7MM AOX
Type: IMPLANTABLE DEVICE | Site: KNEE | Status: FUNCTIONAL
Brand: ATTUNE

## 2020-06-01 DEVICE — SMARTSET HV HIGH VISCOSITY BONE CEMENT 40G
Type: IMPLANTABLE DEVICE | Site: KNEE | Status: FUNCTIONAL
Brand: SMARTSET

## 2020-06-01 DEVICE — ATTUNE KNEE SYSTEM FEMORAL POSTERIOR STABILIZED SIZE 7 RIGHT CEMENTED
Type: IMPLANTABLE DEVICE | Site: KNEE | Status: FUNCTIONAL
Brand: ATTUNE

## 2020-06-01 DEVICE — ATTUNE PATELLA MEDIALIZED DOME 41MM CEMENTED AOX
Type: IMPLANTABLE DEVICE | Site: KNEE | Status: FUNCTIONAL
Brand: ATTUNE

## 2020-06-01 DEVICE — ATTUNE KNEE SYSTEM TIBIAL BASE FIXED BEARING SIZE 7 CEMENTED
Type: IMPLANTABLE DEVICE | Site: KNEE | Status: FUNCTIONAL
Brand: ATTUNE

## 2020-06-01 RX ORDER — ROCURONIUM BROMIDE 10 MG/ML
INJECTION, SOLUTION INTRAVENOUS AS NEEDED
Status: DISCONTINUED | OUTPATIENT
Start: 2020-06-01 | End: 2020-06-01 | Stop reason: SURG

## 2020-06-01 RX ORDER — OXYCODONE HYDROCHLORIDE 5 MG/1
TABLET ORAL
Qty: 30 TABLET | Refills: 0 | Status: SHIPPED | OUTPATIENT
Start: 2020-06-01 | End: 2020-07-15 | Stop reason: HOSPADM

## 2020-06-01 RX ORDER — FOLIC ACID 1 MG/1
1 TABLET ORAL DAILY
Status: DISCONTINUED | OUTPATIENT
Start: 2020-06-01 | End: 2020-06-02 | Stop reason: HOSPADM

## 2020-06-01 RX ORDER — HYDROMORPHONE HCL/PF 1 MG/ML
1 SYRINGE (ML) INJECTION EVERY 2 HOUR PRN
Status: DISCONTINUED | OUTPATIENT
Start: 2020-06-01 | End: 2020-06-02 | Stop reason: HOSPADM

## 2020-06-01 RX ORDER — METHOCARBAMOL 500 MG/1
500 TABLET, FILM COATED ORAL EVERY 6 HOURS SCHEDULED
Status: DISCONTINUED | OUTPATIENT
Start: 2020-06-01 | End: 2020-06-02 | Stop reason: HOSPADM

## 2020-06-01 RX ORDER — HYDRALAZINE HYDROCHLORIDE 25 MG/1
25 TABLET, FILM COATED ORAL EVERY 8 HOURS PRN
Status: DISCONTINUED | OUTPATIENT
Start: 2020-06-01 | End: 2020-06-02 | Stop reason: HOSPADM

## 2020-06-01 RX ORDER — FENTANYL CITRATE/PF 50 MCG/ML
50 SYRINGE (ML) INJECTION
Status: COMPLETED | OUTPATIENT
Start: 2020-06-01 | End: 2020-06-01

## 2020-06-01 RX ORDER — CHLORHEXIDINE GLUCONATE 0.12 MG/ML
15 RINSE ORAL ONCE
Status: COMPLETED | OUTPATIENT
Start: 2020-06-01 | End: 2020-06-01

## 2020-06-01 RX ORDER — SODIUM CHLORIDE, SODIUM LACTATE, POTASSIUM CHLORIDE, CALCIUM CHLORIDE 600; 310; 30; 20 MG/100ML; MG/100ML; MG/100ML; MG/100ML
125 INJECTION, SOLUTION INTRAVENOUS CONTINUOUS
Status: DISCONTINUED | OUTPATIENT
Start: 2020-06-01 | End: 2020-06-02 | Stop reason: HOSPADM

## 2020-06-01 RX ORDER — OXYCODONE HCL 5 MG/5 ML
10 SOLUTION, ORAL ORAL EVERY 4 HOURS PRN
Status: DISCONTINUED | OUTPATIENT
Start: 2020-06-01 | End: 2020-06-01

## 2020-06-01 RX ORDER — GLYCOPYRROLATE 0.2 MG/ML
INJECTION INTRAMUSCULAR; INTRAVENOUS AS NEEDED
Status: DISCONTINUED | OUTPATIENT
Start: 2020-06-01 | End: 2020-06-01 | Stop reason: SURG

## 2020-06-01 RX ORDER — FENTANYL CITRATE 50 UG/ML
INJECTION, SOLUTION INTRAMUSCULAR; INTRAVENOUS AS NEEDED
Status: DISCONTINUED | OUTPATIENT
Start: 2020-06-01 | End: 2020-06-01 | Stop reason: SURG

## 2020-06-01 RX ORDER — CEFAZOLIN SODIUM 2 G/50ML
2000 SOLUTION INTRAVENOUS ONCE
Status: DISCONTINUED | OUTPATIENT
Start: 2020-06-01 | End: 2020-06-01 | Stop reason: HOSPADM

## 2020-06-01 RX ORDER — OXYCODONE HYDROCHLORIDE 10 MG/1
10 TABLET ORAL EVERY 4 HOURS PRN
Status: DISCONTINUED | OUTPATIENT
Start: 2020-06-01 | End: 2020-06-02 | Stop reason: HOSPADM

## 2020-06-01 RX ORDER — ASCORBIC ACID 500 MG
500 TABLET ORAL DAILY
Status: DISCONTINUED | OUTPATIENT
Start: 2020-06-01 | End: 2020-06-02 | Stop reason: HOSPADM

## 2020-06-01 RX ORDER — DOCUSATE SODIUM 100 MG/1
100 CAPSULE, LIQUID FILLED ORAL 2 TIMES DAILY
Status: DISCONTINUED | OUTPATIENT
Start: 2020-06-01 | End: 2020-06-02 | Stop reason: HOSPADM

## 2020-06-01 RX ORDER — CALCIUM CARBONATE 200(500)MG
1000 TABLET,CHEWABLE ORAL DAILY PRN
Status: DISCONTINUED | OUTPATIENT
Start: 2020-06-01 | End: 2020-06-02 | Stop reason: HOSPADM

## 2020-06-01 RX ORDER — ACETAMINOPHEN 325 MG/1
650 TABLET ORAL EVERY 4 HOURS PRN
Status: DISCONTINUED | OUTPATIENT
Start: 2020-06-01 | End: 2020-06-02 | Stop reason: HOSPADM

## 2020-06-01 RX ORDER — ACETAMINOPHEN 325 MG/1
975 TABLET ORAL ONCE
Status: COMPLETED | OUTPATIENT
Start: 2020-06-01 | End: 2020-06-01

## 2020-06-01 RX ORDER — SUCCINYLCHOLINE/SOD CL,ISO/PF 100 MG/5ML
SYRINGE (ML) INTRAVENOUS AS NEEDED
Status: DISCONTINUED | OUTPATIENT
Start: 2020-06-01 | End: 2020-06-01 | Stop reason: SURG

## 2020-06-01 RX ORDER — CEFAZOLIN SODIUM 1 G/3ML
INJECTION, POWDER, FOR SOLUTION INTRAMUSCULAR; INTRAVENOUS AS NEEDED
Status: DISCONTINUED | OUTPATIENT
Start: 2020-06-01 | End: 2020-06-01 | Stop reason: SURG

## 2020-06-01 RX ORDER — CEFAZOLIN SODIUM 2 G/50ML
2000 SOLUTION INTRAVENOUS EVERY 8 HOURS
Status: COMPLETED | OUTPATIENT
Start: 2020-06-01 | End: 2020-06-02

## 2020-06-01 RX ORDER — GABAPENTIN 300 MG/1
300 CAPSULE ORAL ONCE
Status: COMPLETED | OUTPATIENT
Start: 2020-06-01 | End: 2020-06-01

## 2020-06-01 RX ORDER — ALPRAZOLAM 0.5 MG/1
0.5 TABLET ORAL
Status: DISCONTINUED | OUTPATIENT
Start: 2020-06-01 | End: 2020-06-02 | Stop reason: HOSPADM

## 2020-06-01 RX ORDER — AMLODIPINE BESYLATE 10 MG/1
10 TABLET ORAL DAILY
Status: DISCONTINUED | OUTPATIENT
Start: 2020-06-02 | End: 2020-06-02 | Stop reason: HOSPADM

## 2020-06-01 RX ORDER — DEXAMETHASONE SODIUM PHOSPHATE 10 MG/ML
INJECTION, SOLUTION INTRAMUSCULAR; INTRAVENOUS AS NEEDED
Status: DISCONTINUED | OUTPATIENT
Start: 2020-06-01 | End: 2020-06-01 | Stop reason: SURG

## 2020-06-01 RX ORDER — LIDOCAINE HYDROCHLORIDE 10 MG/ML
INJECTION, SOLUTION EPIDURAL; INFILTRATION; INTRACAUDAL; PERINEURAL AS NEEDED
Status: DISCONTINUED | OUTPATIENT
Start: 2020-06-01 | End: 2020-06-01 | Stop reason: SURG

## 2020-06-01 RX ORDER — ACETAMINOPHEN 325 MG/1
975 TABLET ORAL EVERY 8 HOURS SCHEDULED
Status: DISCONTINUED | OUTPATIENT
Start: 2020-06-01 | End: 2020-06-02 | Stop reason: HOSPADM

## 2020-06-01 RX ORDER — ONDANSETRON 2 MG/ML
4 INJECTION INTRAMUSCULAR; INTRAVENOUS ONCE AS NEEDED
Status: DISCONTINUED | OUTPATIENT
Start: 2020-06-01 | End: 2020-06-01 | Stop reason: HOSPADM

## 2020-06-01 RX ORDER — NEOSTIGMINE METHYLSULFATE 1 MG/ML
INJECTION INTRAVENOUS AS NEEDED
Status: DISCONTINUED | OUTPATIENT
Start: 2020-06-01 | End: 2020-06-01 | Stop reason: SURG

## 2020-06-01 RX ORDER — ALBUTEROL SULFATE 2.5 MG/3ML
2.5 SOLUTION RESPIRATORY (INHALATION) ONCE AS NEEDED
Status: DISCONTINUED | OUTPATIENT
Start: 2020-06-01 | End: 2020-06-01 | Stop reason: HOSPADM

## 2020-06-01 RX ORDER — MAGNESIUM HYDROXIDE 1200 MG/15ML
LIQUID ORAL AS NEEDED
Status: DISCONTINUED | OUTPATIENT
Start: 2020-06-01 | End: 2020-06-01 | Stop reason: HOSPADM

## 2020-06-01 RX ORDER — OXYCODONE HYDROCHLORIDE 5 MG/1
5 TABLET ORAL EVERY 4 HOURS PRN
Status: DISCONTINUED | OUTPATIENT
Start: 2020-06-01 | End: 2020-06-02 | Stop reason: HOSPADM

## 2020-06-01 RX ORDER — GABAPENTIN 100 MG/1
100 CAPSULE ORAL 3 TIMES DAILY
Status: DISCONTINUED | OUTPATIENT
Start: 2020-06-01 | End: 2020-06-02 | Stop reason: HOSPADM

## 2020-06-01 RX ORDER — GABAPENTIN 100 MG/1
100 CAPSULE ORAL 3 TIMES DAILY
Status: DISCONTINUED | OUTPATIENT
Start: 2020-06-01 | End: 2020-06-01

## 2020-06-01 RX ORDER — NICOTINE POLACRILEX 2 MG
1 GUM BUCCAL DAILY
COMMUNITY

## 2020-06-01 RX ORDER — FERROUS SULFATE 325(65) MG
325 TABLET ORAL
Status: DISCONTINUED | OUTPATIENT
Start: 2020-06-02 | End: 2020-06-02 | Stop reason: HOSPADM

## 2020-06-01 RX ORDER — AMLODIPINE BESYLATE 10 MG/1
10 TABLET ORAL DAILY
Status: DISCONTINUED | OUTPATIENT
Start: 2020-06-01 | End: 2020-06-01

## 2020-06-01 RX ORDER — ONDANSETRON 2 MG/ML
INJECTION INTRAMUSCULAR; INTRAVENOUS AS NEEDED
Status: DISCONTINUED | OUTPATIENT
Start: 2020-06-01 | End: 2020-06-01 | Stop reason: SURG

## 2020-06-01 RX ORDER — BUPIVACAINE HYDROCHLORIDE 2.5 MG/ML
INJECTION, SOLUTION EPIDURAL; INFILTRATION; INTRACAUDAL AS NEEDED
Status: DISCONTINUED | OUTPATIENT
Start: 2020-06-01 | End: 2020-06-01 | Stop reason: SURG

## 2020-06-01 RX ORDER — TRAMADOL HYDROCHLORIDE 50 MG/1
50 TABLET ORAL EVERY 6 HOURS SCHEDULED
Status: DISCONTINUED | OUTPATIENT
Start: 2020-06-01 | End: 2020-06-02 | Stop reason: HOSPADM

## 2020-06-01 RX ORDER — MIDAZOLAM HYDROCHLORIDE 2 MG/2ML
INJECTION, SOLUTION INTRAMUSCULAR; INTRAVENOUS AS NEEDED
Status: DISCONTINUED | OUTPATIENT
Start: 2020-06-01 | End: 2020-06-01 | Stop reason: SURG

## 2020-06-01 RX ORDER — PROPOFOL 10 MG/ML
INJECTION, EMULSION INTRAVENOUS AS NEEDED
Status: DISCONTINUED | OUTPATIENT
Start: 2020-06-01 | End: 2020-06-01 | Stop reason: SURG

## 2020-06-01 RX ORDER — HYDROMORPHONE HCL/PF 1 MG/ML
0.5 SYRINGE (ML) INJECTION
Status: COMPLETED | OUTPATIENT
Start: 2020-06-01 | End: 2020-06-01

## 2020-06-01 RX ORDER — PANTOPRAZOLE SODIUM 40 MG/1
40 TABLET, DELAYED RELEASE ORAL
Status: DISCONTINUED | OUTPATIENT
Start: 2020-06-02 | End: 2020-06-02 | Stop reason: HOSPADM

## 2020-06-01 RX ADMIN — SODIUM CHLORIDE, SODIUM LACTATE, POTASSIUM CHLORIDE, AND CALCIUM CHLORIDE 125 ML/HR: .6; .31; .03; .02 INJECTION, SOLUTION INTRAVENOUS at 16:56

## 2020-06-01 RX ADMIN — FENTANYL CITRATE 50 MCG: 50 INJECTION INTRAMUSCULAR; INTRAVENOUS at 15:20

## 2020-06-01 RX ADMIN — LIDOCAINE HYDROCHLORIDE 50 MG: 10 INJECTION, SOLUTION EPIDURAL; INFILTRATION; INTRACAUDAL; PERINEURAL at 13:12

## 2020-06-01 RX ADMIN — GABAPENTIN 300 MG: 300 CAPSULE ORAL at 11:14

## 2020-06-01 RX ADMIN — FENTANYL CITRATE 50 MCG: 50 INJECTION INTRAMUSCULAR; INTRAVENOUS at 15:29

## 2020-06-01 RX ADMIN — TRAMADOL HYDROCHLORIDE 50 MG: 50 TABLET, FILM COATED ORAL at 18:03

## 2020-06-01 RX ADMIN — FENTANYL CITRATE 100 MCG: 50 INJECTION, SOLUTION INTRAMUSCULAR; INTRAVENOUS at 13:12

## 2020-06-01 RX ADMIN — MIDAZOLAM 2 MG: 1 INJECTION INTRAMUSCULAR; INTRAVENOUS at 13:06

## 2020-06-01 RX ADMIN — BUPIVACAINE 20 ML: 13.3 INJECTION, SUSPENSION, LIPOSOMAL INFILTRATION at 12:40

## 2020-06-01 RX ADMIN — HYDROMORPHONE HYDROCHLORIDE 0.5 MG: 1 INJECTION, SOLUTION INTRAMUSCULAR; INTRAVENOUS; SUBCUTANEOUS at 15:56

## 2020-06-01 RX ADMIN — GABAPENTIN 100 MG: 100 CAPSULE ORAL at 18:03

## 2020-06-01 RX ADMIN — OXYCODONE HYDROCHLORIDE 10 MG: 10 TABLET ORAL at 18:03

## 2020-06-01 RX ADMIN — GABAPENTIN 100 MG: 100 CAPSULE ORAL at 21:12

## 2020-06-01 RX ADMIN — BUPIVACAINE HYDROCHLORIDE 5 ML: 2.5 INJECTION, SOLUTION EPIDURAL; INFILTRATION; INTRACAUDAL at 12:40

## 2020-06-01 RX ADMIN — TRAMADOL HYDROCHLORIDE 50 MG: 50 TABLET, FILM COATED ORAL at 23:15

## 2020-06-01 RX ADMIN — HYDROMORPHONE HYDROCHLORIDE 0.5 MG: 1 INJECTION, SOLUTION INTRAMUSCULAR; INTRAVENOUS; SUBCUTANEOUS at 16:20

## 2020-06-01 RX ADMIN — Medication 120 MG: at 13:12

## 2020-06-01 RX ADMIN — CEFAZOLIN SODIUM 2000 MG: 2 SOLUTION INTRAVENOUS at 21:13

## 2020-06-01 RX ADMIN — NEOSTIGMINE METHYLSULFATE 3.5 MG: 1 INJECTION, SOLUTION INTRAVENOUS at 14:36

## 2020-06-01 RX ADMIN — CEFAZOLIN 2000 MG: 1 INJECTION, POWDER, FOR SOLUTION INTRAVENOUS at 13:10

## 2020-06-01 RX ADMIN — FENTANYL CITRATE 50 MCG: 50 INJECTION INTRAMUSCULAR; INTRAVENOUS at 15:15

## 2020-06-01 RX ADMIN — CHLORHEXIDINE GLUCONATE 0.12% ORAL RINSE 15 ML: 1.2 LIQUID ORAL at 11:15

## 2020-06-01 RX ADMIN — ONDANSETRON 4 MG: 2 INJECTION INTRAMUSCULAR; INTRAVENOUS at 13:12

## 2020-06-01 RX ADMIN — SODIUM CHLORIDE, SODIUM LACTATE, POTASSIUM CHLORIDE, AND CALCIUM CHLORIDE 125 ML/HR: .6; .31; .03; .02 INJECTION, SOLUTION INTRAVENOUS at 12:00

## 2020-06-01 RX ADMIN — HYDROMORPHONE HYDROCHLORIDE 1 MG: 1 INJECTION, SOLUTION INTRAMUSCULAR; INTRAVENOUS; SUBCUTANEOUS at 19:28

## 2020-06-01 RX ADMIN — DOCUSATE SODIUM 100 MG: 100 CAPSULE, LIQUID FILLED ORAL at 18:03

## 2020-06-01 RX ADMIN — METHOCARBAMOL TABLETS 500 MG: 500 TABLET, COATED ORAL at 23:15

## 2020-06-01 RX ADMIN — FENTANYL CITRATE 100 MCG: 50 INJECTION, SOLUTION INTRAMUSCULAR; INTRAVENOUS at 14:50

## 2020-06-01 RX ADMIN — FENTANYL CITRATE 50 MCG: 50 INJECTION, SOLUTION INTRAMUSCULAR; INTRAVENOUS at 14:40

## 2020-06-01 RX ADMIN — ROCURONIUM BROMIDE 50 MG: 10 INJECTION, SOLUTION INTRAVENOUS at 13:20

## 2020-06-01 RX ADMIN — TRANEXAMIC ACID 1000 MG: 1 INJECTION, SOLUTION INTRAVENOUS at 13:25

## 2020-06-01 RX ADMIN — BUPIVACAINE HYDROCHLORIDE 15 ML: 2.5 INJECTION, SOLUTION EPIDURAL; INFILTRATION; INTRACAUDAL at 12:45

## 2020-06-01 RX ADMIN — PROPOFOL 200 MG: 10 INJECTION, EMULSION INTRAVENOUS at 13:12

## 2020-06-01 RX ADMIN — ACETAMINOPHEN 975 MG: 325 TABLET ORAL at 11:14

## 2020-06-01 RX ADMIN — ACETAMINOPHEN 975 MG: 325 TABLET ORAL at 19:29

## 2020-06-01 RX ADMIN — SODIUM CHLORIDE, SODIUM LACTATE, POTASSIUM CHLORIDE, AND CALCIUM CHLORIDE: .6; .31; .03; .02 INJECTION, SOLUTION INTRAVENOUS at 13:06

## 2020-06-01 RX ADMIN — FENTANYL CITRATE 50 MCG: 50 INJECTION, SOLUTION INTRAMUSCULAR; INTRAVENOUS at 14:55

## 2020-06-01 RX ADMIN — HYDROMORPHONE HYDROCHLORIDE 0.5 MG: 1 INJECTION, SOLUTION INTRAMUSCULAR; INTRAVENOUS; SUBCUTANEOUS at 16:45

## 2020-06-01 RX ADMIN — FOLIC ACID 1 MG: 1 TABLET ORAL at 18:03

## 2020-06-01 RX ADMIN — DEXAMETHASONE SODIUM PHOSPHATE 10 MG: 10 INJECTION, SOLUTION INTRAMUSCULAR; INTRAVENOUS at 13:12

## 2020-06-01 RX ADMIN — FENTANYL CITRATE 50 MCG: 50 INJECTION INTRAMUSCULAR; INTRAVENOUS at 15:35

## 2020-06-01 RX ADMIN — HYDROMORPHONE HYDROCHLORIDE 0.5 MG: 1 INJECTION, SOLUTION INTRAMUSCULAR; INTRAVENOUS; SUBCUTANEOUS at 15:44

## 2020-06-01 RX ADMIN — GLYCOPYRROLATE 0.2 MG: 0.2 INJECTION, SOLUTION INTRAMUSCULAR; INTRAVENOUS at 14:36

## 2020-06-02 VITALS
OXYGEN SATURATION: 94 % | SYSTOLIC BLOOD PRESSURE: 125 MMHG | DIASTOLIC BLOOD PRESSURE: 69 MMHG | TEMPERATURE: 98.5 F | RESPIRATION RATE: 18 BRPM | HEIGHT: 72 IN | BODY MASS INDEX: 34 KG/M2 | HEART RATE: 85 BPM | WEIGHT: 251 LBS

## 2020-06-02 PROBLEM — M25.561 CHRONIC PAIN OF RIGHT KNEE: Status: RESOLVED | Noted: 2020-02-11 | Resolved: 2020-06-02

## 2020-06-02 PROBLEM — Z96.651 STATUS POST RIGHT KNEE REPLACEMENT: Status: ACTIVE | Noted: 2020-06-02

## 2020-06-02 PROBLEM — Z96.652 STATUS POST LEFT KNEE REPLACEMENT: Status: ACTIVE | Noted: 2020-06-02

## 2020-06-02 PROBLEM — M17.11 PRIMARY OSTEOARTHRITIS OF RIGHT KNEE: Status: RESOLVED | Noted: 2020-02-11 | Resolved: 2020-06-02

## 2020-06-02 PROBLEM — M70.51 PES ANSERINUS BURSITIS OF RIGHT KNEE: Status: RESOLVED | Noted: 2020-02-11 | Resolved: 2020-06-02

## 2020-06-02 PROBLEM — G89.29 CHRONIC PAIN OF RIGHT KNEE: Status: RESOLVED | Noted: 2020-02-11 | Resolved: 2020-06-02

## 2020-06-02 LAB
ANION GAP SERPL CALCULATED.3IONS-SCNC: 7 MMOL/L (ref 4–13)
BUN SERPL-MCNC: 12 MG/DL (ref 5–25)
CALCIUM SERPL-MCNC: 8.9 MG/DL (ref 8.3–10.1)
CHLORIDE SERPL-SCNC: 105 MMOL/L (ref 100–108)
CO2 SERPL-SCNC: 24 MMOL/L (ref 21–32)
CREAT SERPL-MCNC: 0.88 MG/DL (ref 0.6–1.3)
ERYTHROCYTE [DISTWIDTH] IN BLOOD BY AUTOMATED COUNT: 12.5 % (ref 11.6–15.1)
GFR SERPL CREATININE-BSD FRML MDRD: 94 ML/MIN/1.73SQ M
GLUCOSE SERPL-MCNC: 186 MG/DL (ref 65–140)
GLUCOSE SERPL-MCNC: 186 MG/DL (ref 65–140)
GLUCOSE SERPL-MCNC: 228 MG/DL (ref 65–140)
HCT VFR BLD AUTO: 35.9 % (ref 36.5–49.3)
HGB BLD-MCNC: 12.2 G/DL (ref 12–17)
MCH RBC QN AUTO: 29.8 PG (ref 26.8–34.3)
MCHC RBC AUTO-ENTMCNC: 34 G/DL (ref 31.4–37.4)
MCV RBC AUTO: 88 FL (ref 82–98)
PLATELET # BLD AUTO: 275 THOUSANDS/UL (ref 149–390)
PMV BLD AUTO: 10.9 FL (ref 8.9–12.7)
POTASSIUM SERPL-SCNC: 3.7 MMOL/L (ref 3.5–5.3)
RBC # BLD AUTO: 4.09 MILLION/UL (ref 3.88–5.62)
SODIUM SERPL-SCNC: 136 MMOL/L (ref 136–145)
WBC # BLD AUTO: 14.59 THOUSAND/UL (ref 4.31–10.16)

## 2020-06-02 PROCEDURE — NC001 PR NO CHARGE: Performed by: ORTHOPAEDIC SURGERY

## 2020-06-02 PROCEDURE — 97116 GAIT TRAINING THERAPY: CPT

## 2020-06-02 PROCEDURE — 85027 COMPLETE CBC AUTOMATED: CPT | Performed by: NURSE PRACTITIONER

## 2020-06-02 PROCEDURE — 80048 BASIC METABOLIC PNL TOTAL CA: CPT | Performed by: PHYSICIAN ASSISTANT

## 2020-06-02 PROCEDURE — 82948 REAGENT STRIP/BLOOD GLUCOSE: CPT

## 2020-06-02 PROCEDURE — 97166 OT EVAL MOD COMPLEX 45 MIN: CPT

## 2020-06-02 RX ORDER — ACETAMINOPHEN 325 MG/1
650 TABLET ORAL EVERY 6 HOURS SCHEDULED
Qty: 30 TABLET | Refills: 0 | Status: SHIPPED | OUTPATIENT
Start: 2020-06-02

## 2020-06-02 RX ORDER — METHOCARBAMOL 500 MG/1
500 TABLET, FILM COATED ORAL EVERY 6 HOURS SCHEDULED
Qty: 30 TABLET | Refills: 0 | Status: SHIPPED | OUTPATIENT
Start: 2020-06-02 | End: 2022-06-08

## 2020-06-02 RX ORDER — METHOCARBAMOL 500 MG/1
500 TABLET, FILM COATED ORAL EVERY 6 HOURS SCHEDULED
Qty: 30 TABLET | Refills: 0 | Status: SHIPPED | OUTPATIENT
Start: 2020-06-02 | End: 2020-06-02

## 2020-06-02 RX ORDER — ACETAMINOPHEN 325 MG/1
650 TABLET ORAL EVERY 6 HOURS SCHEDULED
Qty: 30 TABLET | Refills: 0 | Status: SHIPPED | OUTPATIENT
Start: 2020-06-02 | End: 2020-06-02

## 2020-06-02 RX ADMIN — OXYCODONE HYDROCHLORIDE AND ACETAMINOPHEN 500 MG: 500 TABLET ORAL at 08:21

## 2020-06-02 RX ADMIN — METHOCARBAMOL TABLETS 500 MG: 500 TABLET, COATED ORAL at 05:19

## 2020-06-02 RX ADMIN — OXYCODONE HYDROCHLORIDE 10 MG: 10 TABLET ORAL at 13:28

## 2020-06-02 RX ADMIN — CEFAZOLIN SODIUM 2000 MG: 2 SOLUTION INTRAVENOUS at 05:20

## 2020-06-02 RX ADMIN — DOCUSATE SODIUM 100 MG: 100 CAPSULE, LIQUID FILLED ORAL at 08:21

## 2020-06-02 RX ADMIN — PANTOPRAZOLE SODIUM 40 MG: 40 TABLET, DELAYED RELEASE ORAL at 05:20

## 2020-06-02 RX ADMIN — ENOXAPARIN SODIUM 40 MG: 40 INJECTION SUBCUTANEOUS at 02:36

## 2020-06-02 RX ADMIN — TRAMADOL HYDROCHLORIDE 50 MG: 50 TABLET, FILM COATED ORAL at 11:13

## 2020-06-02 RX ADMIN — TRAMADOL HYDROCHLORIDE 50 MG: 50 TABLET, FILM COATED ORAL at 05:19

## 2020-06-02 RX ADMIN — FERROUS SULFATE TAB 325 MG (65 MG ELEMENTAL FE) 325 MG: 325 (65 FE) TAB at 08:21

## 2020-06-02 RX ADMIN — OXYCODONE HYDROCHLORIDE 10 MG: 10 TABLET ORAL at 01:35

## 2020-06-02 RX ADMIN — ACETAMINOPHEN 975 MG: 325 TABLET ORAL at 05:20

## 2020-06-02 RX ADMIN — GABAPENTIN 100 MG: 100 CAPSULE ORAL at 08:21

## 2020-06-02 RX ADMIN — FOLIC ACID 1 MG: 1 TABLET ORAL at 08:21

## 2020-06-02 RX ADMIN — OXYCODONE HYDROCHLORIDE 10 MG: 10 TABLET ORAL at 08:21

## 2020-06-02 RX ADMIN — ACETAMINOPHEN 975 MG: 325 TABLET ORAL at 13:28

## 2020-06-02 RX ADMIN — METHOCARBAMOL TABLETS 500 MG: 500 TABLET, COATED ORAL at 11:13

## 2020-06-03 ENCOUNTER — TELEPHONE (OUTPATIENT)
Dept: OBGYN CLINIC | Facility: HOSPITAL | Age: 60
End: 2020-06-03

## 2020-06-03 DIAGNOSIS — M17.11 PRIMARY OSTEOARTHRITIS OF RIGHT KNEE: Primary | ICD-10-CM

## 2020-06-03 RX ORDER — TRAMADOL HYDROCHLORIDE 50 MG/1
50 TABLET ORAL EVERY 6 HOURS PRN
Qty: 30 TABLET | Refills: 0 | Status: SHIPPED | OUTPATIENT
Start: 2020-06-03 | End: 2020-07-15 | Stop reason: HOSPADM

## 2020-06-04 ENCOUNTER — TELEPHONE (OUTPATIENT)
Dept: OBGYN CLINIC | Facility: HOSPITAL | Age: 60
End: 2020-06-04

## 2020-06-04 ENCOUNTER — EVALUATION (OUTPATIENT)
Dept: PHYSICAL THERAPY | Facility: REHABILITATION | Age: 60
End: 2020-06-04
Payer: COMMERCIAL

## 2020-06-04 DIAGNOSIS — M17.11 PRIMARY OSTEOARTHRITIS OF RIGHT KNEE: Primary | ICD-10-CM

## 2020-06-04 PROCEDURE — 97140 MANUAL THERAPY 1/> REGIONS: CPT | Performed by: PHYSICAL THERAPIST

## 2020-06-04 PROCEDURE — 97161 PT EVAL LOW COMPLEX 20 MIN: CPT | Performed by: PHYSICAL THERAPIST

## 2020-06-05 ENCOUNTER — TELEPHONE (OUTPATIENT)
Dept: OBGYN CLINIC | Facility: HOSPITAL | Age: 60
End: 2020-06-05

## 2020-06-08 ENCOUNTER — OFFICE VISIT (OUTPATIENT)
Dept: PHYSICAL THERAPY | Facility: REHABILITATION | Age: 60
End: 2020-06-08
Payer: COMMERCIAL

## 2020-06-08 DIAGNOSIS — M17.11 PRIMARY OSTEOARTHRITIS OF RIGHT KNEE: Primary | ICD-10-CM

## 2020-06-08 PROCEDURE — 97110 THERAPEUTIC EXERCISES: CPT | Performed by: PHYSICAL THERAPIST

## 2020-06-08 PROCEDURE — 97112 NEUROMUSCULAR REEDUCATION: CPT | Performed by: PHYSICAL THERAPIST

## 2020-06-08 PROCEDURE — 97140 MANUAL THERAPY 1/> REGIONS: CPT | Performed by: PHYSICAL THERAPIST

## 2020-06-09 ENCOUNTER — OFFICE VISIT (OUTPATIENT)
Dept: OBGYN CLINIC | Facility: HOSPITAL | Age: 60
End: 2020-06-09

## 2020-06-09 ENCOUNTER — HOSPITAL ENCOUNTER (OUTPATIENT)
Dept: RADIOLOGY | Facility: HOSPITAL | Age: 60
Discharge: HOME | End: 2020-06-09
Attending: ORTHOPAEDIC SURGERY
Payer: COMMERCIAL

## 2020-06-09 VITALS
HEART RATE: 98 BPM | BODY MASS INDEX: 34.04 KG/M2 | SYSTOLIC BLOOD PRESSURE: 124 MMHG | HEIGHT: 72 IN | DIASTOLIC BLOOD PRESSURE: 70 MMHG

## 2020-06-09 DIAGNOSIS — Z47.1 AFTERCARE FOLLOWING RIGHT KNEE JOINT REPLACEMENT SURGERY: Primary | ICD-10-CM

## 2020-06-09 DIAGNOSIS — Z96.651 AFTERCARE FOLLOWING RIGHT KNEE JOINT REPLACEMENT SURGERY: ICD-10-CM

## 2020-06-09 DIAGNOSIS — Z47.1 AFTERCARE FOLLOWING RIGHT KNEE JOINT REPLACEMENT SURGERY: ICD-10-CM

## 2020-06-09 DIAGNOSIS — Z96.651 AFTERCARE FOLLOWING RIGHT KNEE JOINT REPLACEMENT SURGERY: Primary | ICD-10-CM

## 2020-06-09 PROCEDURE — 73560 X-RAY EXAM OF KNEE 1 OR 2: CPT

## 2020-06-09 PROCEDURE — 99024 POSTOP FOLLOW-UP VISIT: CPT | Performed by: ORTHOPAEDIC SURGERY

## 2020-06-10 ENCOUNTER — OFFICE VISIT (OUTPATIENT)
Dept: PHYSICAL THERAPY | Facility: REHABILITATION | Age: 60
End: 2020-06-10
Payer: COMMERCIAL

## 2020-06-10 DIAGNOSIS — M17.11 PRIMARY OSTEOARTHRITIS OF RIGHT KNEE: Primary | ICD-10-CM

## 2020-06-10 PROCEDURE — 97140 MANUAL THERAPY 1/> REGIONS: CPT | Performed by: PHYSICAL THERAPIST

## 2020-06-10 PROCEDURE — 97112 NEUROMUSCULAR REEDUCATION: CPT | Performed by: PHYSICAL THERAPIST

## 2020-06-15 ENCOUNTER — OFFICE VISIT (OUTPATIENT)
Dept: PHYSICAL THERAPY | Facility: REHABILITATION | Age: 60
End: 2020-06-15
Payer: COMMERCIAL

## 2020-06-15 DIAGNOSIS — M17.11 PRIMARY OSTEOARTHRITIS OF RIGHT KNEE: Primary | ICD-10-CM

## 2020-06-15 PROCEDURE — 97140 MANUAL THERAPY 1/> REGIONS: CPT | Performed by: PHYSICAL THERAPIST

## 2020-06-15 PROCEDURE — 97110 THERAPEUTIC EXERCISES: CPT | Performed by: PHYSICAL THERAPIST

## 2020-06-15 PROCEDURE — 97112 NEUROMUSCULAR REEDUCATION: CPT | Performed by: PHYSICAL THERAPIST

## 2020-06-16 ENCOUNTER — OFFICE VISIT (OUTPATIENT)
Dept: OBGYN CLINIC | Facility: HOSPITAL | Age: 60
End: 2020-06-16

## 2020-06-16 VITALS
BODY MASS INDEX: 34.04 KG/M2 | HEART RATE: 105 BPM | DIASTOLIC BLOOD PRESSURE: 67 MMHG | HEIGHT: 72 IN | SYSTOLIC BLOOD PRESSURE: 111 MMHG

## 2020-06-16 DIAGNOSIS — Z96.651 STATUS POST RIGHT KNEE REPLACEMENT: Primary | ICD-10-CM

## 2020-06-16 PROCEDURE — 99024 POSTOP FOLLOW-UP VISIT: CPT | Performed by: ORTHOPAEDIC SURGERY

## 2020-06-18 ENCOUNTER — OFFICE VISIT (OUTPATIENT)
Dept: PHYSICAL THERAPY | Facility: REHABILITATION | Age: 60
End: 2020-06-18
Payer: COMMERCIAL

## 2020-06-18 DIAGNOSIS — M17.11 PRIMARY OSTEOARTHRITIS OF RIGHT KNEE: Primary | ICD-10-CM

## 2020-06-18 PROCEDURE — 97140 MANUAL THERAPY 1/> REGIONS: CPT | Performed by: PHYSICAL THERAPIST

## 2020-06-18 PROCEDURE — 97110 THERAPEUTIC EXERCISES: CPT | Performed by: PHYSICAL THERAPIST

## 2020-06-18 PROCEDURE — 97112 NEUROMUSCULAR REEDUCATION: CPT | Performed by: PHYSICAL THERAPIST

## 2020-06-22 ENCOUNTER — OFFICE VISIT (OUTPATIENT)
Dept: PHYSICAL THERAPY | Facility: REHABILITATION | Age: 60
End: 2020-06-22
Payer: COMMERCIAL

## 2020-06-22 ENCOUNTER — TELEPHONE (OUTPATIENT)
Dept: OBGYN CLINIC | Facility: MEDICAL CENTER | Age: 60
End: 2020-06-22

## 2020-06-22 VITALS — TEMPERATURE: 99.4 F

## 2020-06-22 DIAGNOSIS — M17.11 PRIMARY OSTEOARTHRITIS OF RIGHT KNEE: Primary | ICD-10-CM

## 2020-06-22 PROCEDURE — 97112 NEUROMUSCULAR REEDUCATION: CPT | Performed by: PHYSICAL THERAPIST

## 2020-06-22 PROCEDURE — 97140 MANUAL THERAPY 1/> REGIONS: CPT | Performed by: PHYSICAL THERAPIST

## 2020-06-22 PROCEDURE — 97110 THERAPEUTIC EXERCISES: CPT | Performed by: PHYSICAL THERAPIST

## 2020-06-23 ENCOUNTER — OFFICE VISIT (OUTPATIENT)
Dept: OBGYN CLINIC | Facility: HOSPITAL | Age: 60
End: 2020-06-23

## 2020-06-23 VITALS
WEIGHT: 251 LBS | HEART RATE: 93 BPM | BODY MASS INDEX: 34 KG/M2 | SYSTOLIC BLOOD PRESSURE: 116 MMHG | DIASTOLIC BLOOD PRESSURE: 79 MMHG | HEIGHT: 72 IN

## 2020-06-23 DIAGNOSIS — Z96.651 STATUS POST RIGHT KNEE REPLACEMENT: Primary | ICD-10-CM

## 2020-06-23 PROCEDURE — 99024 POSTOP FOLLOW-UP VISIT: CPT | Performed by: ORTHOPAEDIC SURGERY

## 2020-06-23 RX ORDER — SULFAMETHOXAZOLE AND TRIMETHOPRIM 800; 160 MG/1; MG/1
1 TABLET ORAL EVERY 12 HOURS SCHEDULED
Qty: 20 TABLET | Refills: 0 | Status: SHIPPED | OUTPATIENT
Start: 2020-06-23 | End: 2020-07-03

## 2020-06-25 ENCOUNTER — OFFICE VISIT (OUTPATIENT)
Dept: PHYSICAL THERAPY | Facility: REHABILITATION | Age: 60
End: 2020-06-25
Payer: COMMERCIAL

## 2020-06-25 DIAGNOSIS — M17.11 PRIMARY OSTEOARTHRITIS OF RIGHT KNEE: Primary | ICD-10-CM

## 2020-06-25 PROCEDURE — 97112 NEUROMUSCULAR REEDUCATION: CPT | Performed by: PHYSICAL THERAPIST

## 2020-06-25 PROCEDURE — 97110 THERAPEUTIC EXERCISES: CPT | Performed by: PHYSICAL THERAPIST

## 2020-06-25 PROCEDURE — 97140 MANUAL THERAPY 1/> REGIONS: CPT | Performed by: PHYSICAL THERAPIST

## 2020-06-29 ENCOUNTER — OFFICE VISIT (OUTPATIENT)
Dept: PHYSICAL THERAPY | Facility: REHABILITATION | Age: 60
End: 2020-06-29
Payer: COMMERCIAL

## 2020-06-29 DIAGNOSIS — M17.11 PRIMARY OSTEOARTHRITIS OF RIGHT KNEE: Primary | ICD-10-CM

## 2020-06-29 PROCEDURE — 97110 THERAPEUTIC EXERCISES: CPT | Performed by: PHYSICAL THERAPIST

## 2020-06-29 PROCEDURE — 97140 MANUAL THERAPY 1/> REGIONS: CPT | Performed by: PHYSICAL THERAPIST

## 2020-06-30 ENCOUNTER — OFFICE VISIT (OUTPATIENT)
Dept: OBGYN CLINIC | Facility: HOSPITAL | Age: 60
End: 2020-06-30

## 2020-06-30 VITALS
HEIGHT: 72 IN | SYSTOLIC BLOOD PRESSURE: 108 MMHG | DIASTOLIC BLOOD PRESSURE: 72 MMHG | BODY MASS INDEX: 34.04 KG/M2 | HEART RATE: 79 BPM

## 2020-06-30 DIAGNOSIS — Z96.651 STATUS POST RIGHT KNEE REPLACEMENT: Primary | ICD-10-CM

## 2020-06-30 PROCEDURE — 99024 POSTOP FOLLOW-UP VISIT: CPT | Performed by: ORTHOPAEDIC SURGERY

## 2020-07-02 ENCOUNTER — OFFICE VISIT (OUTPATIENT)
Dept: PHYSICAL THERAPY | Facility: REHABILITATION | Age: 60
End: 2020-07-02
Payer: COMMERCIAL

## 2020-07-02 DIAGNOSIS — M17.11 PRIMARY OSTEOARTHRITIS OF RIGHT KNEE: Primary | ICD-10-CM

## 2020-07-02 PROCEDURE — 97140 MANUAL THERAPY 1/> REGIONS: CPT | Performed by: PHYSICAL THERAPIST

## 2020-07-02 PROCEDURE — 97110 THERAPEUTIC EXERCISES: CPT | Performed by: PHYSICAL THERAPIST

## 2020-07-02 PROCEDURE — 97112 NEUROMUSCULAR REEDUCATION: CPT | Performed by: PHYSICAL THERAPIST

## 2020-07-02 NOTE — PROGRESS NOTES
Daily Note     Today's date: 2020  Patient name: Maxwell Perez  : 1960  MRN: [id number]  Referring provider: Courtney Gitelman, PA-C  Dx:   Encounter Diagnosis     ICD-10-CM    1  Primary osteoarthritis of right knee M17 11                   Subjective: Pt notes that he is feeling really stiff today, but notes despite the stiffness he was able to go up and down the stairs 3 times today  Objective: See treatment diary below  Flexion: 94 deg active   Flexion: 102 deg passive     Assessment: Pt demonstrated improved ROM compared to previous session  Still has limitations in attaining full extension which is leading to difficulty with quad recruitment -15 prior to stretching -5 with therapist OP  Continue working on extension ROM in order to improve quad function and overall function    Plan: Continue per plan of care        Precautions: None      Manuals 6/8 6/10 6/15 6/118 6/22 6/25 6/29 7     Patellar ROM austin 2' AUSTIN 5' AUSTIN 5' AUSTIN  5' AUSTIN 5' austin 5' TB 5' KB 5'     Knee flex/ext st AUSTIN 10' AUSTIN 20 AUSTIN 13 AUSTIN 20 AUSTIN 10' austin 10' TB 10' KB 10'                               Neuro Re-Ed             Quad set 10s x20 10s x20 10s x20 10s x20 10s x20 D/c       TKE 3s x20 3s x20 3s x20 5s x30 5s x20 5sx20 20x5s 20x5s YTB     LAQ   2x15 2x15 2x15 2x15 2x15 2x15                                                         Ther Ex             Nu step   10' 10' 10' 10' 10' 10'     SLR flex/abd aa 2x10 2x10  2x10 2# 2x15 2# 2x15 2# 2x15 2# 2x15     Heel slides 2x10 3x10 3x10 2x15 2x15 2x20 2x20 2x20     squats      5s x20 20x5s 20x5s     FSU/LSU      2x10 2x10 2x10                                                         Ther Activity                                       Gait Training                                       Modalities

## 2020-07-06 ENCOUNTER — OFFICE VISIT (OUTPATIENT)
Dept: PHYSICAL THERAPY | Facility: REHABILITATION | Age: 60
End: 2020-07-06
Payer: COMMERCIAL

## 2020-07-06 DIAGNOSIS — M17.11 PRIMARY OSTEOARTHRITIS OF RIGHT KNEE: Primary | ICD-10-CM

## 2020-07-06 PROCEDURE — 97140 MANUAL THERAPY 1/> REGIONS: CPT | Performed by: PHYSICAL THERAPIST

## 2020-07-06 PROCEDURE — 97110 THERAPEUTIC EXERCISES: CPT | Performed by: PHYSICAL THERAPIST

## 2020-07-06 NOTE — PROGRESS NOTES
Daily Note     Today's date: 2020  Patient name: Renetta Huerta  : 1960  MRN: [id number]  Referring provider: Pedro Osorio PA-C  Dx:   Encounter Diagnosis     ICD-10-CM    1  Primary osteoarthritis of right knee M17 11                   Subjective: Tightness persists, but motion improving  Objective: See treatment diary below      Assessment: Gait improving - needs cueing for knee flexion during gait  Plan: Continue per plan of care        Precautions: None      Manuals 6/8 6/10 6/15 6 7    Patellar ROM rene 2' RENE 5' RENE 5' RENE  5' RENE 5' rene 5' TB 5' KB 5' Rene 5'    Knee flex/ext st RENE 10' RENE 20 RENE 13 RENE 20 RENE 10' rene 10' TB 10' KB 10' RENE 10'                              Neuro Re-Ed             Quad set 10s x20 10s x20 10s x20 10s x20 10s x20 D/c       TKE 3s x20 3s x20 3s x20 5s x30 5s x20 5sx20 20x5s 20x5s YTB 20x5s 3#    LAQ   2x15 2x15 2x15 2x15 2x15 2x15 2x15                                                        Ther Ex             Nu step   10' 10' 10' 10' 10' 10' 10'    SLR flex/abd aa 2x10 2x10  2x10 2# 2x15 2# 2x15 2# 2x15 2# 2x15 NP    Heel slides 2x10 3x10 3x10 2x15 2x15 2x20 2x20 2x20 2x10    squats      5s x20 20x5s 20x5s 5s x20    FSU/LSU      2x10 2x10 2x10 2x15    SLS foam         30s x5                                           Ther Activity                                       Gait Training                                       Modalities

## 2020-07-09 ENCOUNTER — OFFICE VISIT (OUTPATIENT)
Dept: PHYSICAL THERAPY | Facility: REHABILITATION | Age: 60
End: 2020-07-09
Payer: COMMERCIAL

## 2020-07-09 DIAGNOSIS — M17.11 PRIMARY OSTEOARTHRITIS OF RIGHT KNEE: Primary | ICD-10-CM

## 2020-07-09 PROCEDURE — 97112 NEUROMUSCULAR REEDUCATION: CPT | Performed by: PHYSICAL THERAPIST

## 2020-07-09 PROCEDURE — 97140 MANUAL THERAPY 1/> REGIONS: CPT | Performed by: PHYSICAL THERAPIST

## 2020-07-09 PROCEDURE — 97110 THERAPEUTIC EXERCISES: CPT | Performed by: PHYSICAL THERAPIST

## 2020-07-09 NOTE — PROGRESS NOTES
Daily Note     Today's date: 2020  Patient name: Bao Mejia  : 1960  MRN: [id number]  Referring provider: Berry Callejas PA-C  Dx: No diagnosis found  Subjective: Patient with no new complaints  Objective: See treatment diary below      Assessment: Knee flexion ROM significantly improved from previous session  Plan: Continue per plan of care        Precautions: None      Manuals 6/8 6/10 6/15 6/118    Patellar ROM rene 2' RENE 5' RENE 5' RENE  5' RENE 5' rene 5' TB 5' KB 5' Rene 5' RENE 5'   Knee flex/ext st RENE 10' RENE 20 RENE 13 RENE 21 RENE 10' rene 10' TB 10' KB 10' RENE 10' RENE 10'                             Neuro Re-Ed             Quad set 10s x20 10s x20 10s x20 10s x20 10s x20 D/c       TKE 3s x20 3s x20 3s x20 5s x30 5s x20 5sx20 20x5s 20x5s YTB 20x5s 3#    LAQ   2x15 2x15 2x15 2x15 2x15 2x15 2x15                                                        Ther Ex             Nu step   10' 10' 10' 10' 10' 10' 10' 10'    SLR flex/abd aa 2x10 2x10  2x10 2# 2x15 2# 2x15 2# 2x15 2# 2x15 NP    Heel slides 2x10 3x10 3x10 2x15 2x15 2x20 2x20 2x20 2x10 2x10   squats      5s x20 20x5s 20x5s 5s x20 5s x20   FSU/LSU      2x10 2x10 2x10 2x15 2x20   SLS foam         30s x5 30s x5   Leg press          55# 2x15                             Ther Activity                                       Gait Training                                       Modalities

## 2020-07-13 ENCOUNTER — EVALUATION (OUTPATIENT)
Dept: PHYSICAL THERAPY | Facility: REHABILITATION | Age: 60
End: 2020-07-13
Payer: COMMERCIAL

## 2020-07-13 DIAGNOSIS — M17.11 PRIMARY OSTEOARTHRITIS OF RIGHT KNEE: Primary | ICD-10-CM

## 2020-07-13 PROCEDURE — 97112 NEUROMUSCULAR REEDUCATION: CPT | Performed by: PHYSICAL THERAPIST

## 2020-07-13 PROCEDURE — 97110 THERAPEUTIC EXERCISES: CPT | Performed by: PHYSICAL THERAPIST

## 2020-07-13 PROCEDURE — 97140 MANUAL THERAPY 1/> REGIONS: CPT | Performed by: PHYSICAL THERAPIST

## 2020-07-13 NOTE — PROGRESS NOTES
Daily Note     Today's date: 2020  Patient name: Aaron Jimenez  : 1960  MRN: [id number]  Referring provider: Kwabena Dang PA-C  Dx:   Encounter Diagnosis     ICD-10-CM    1  Primary osteoarthritis of right knee M17 11                   Subjective: Reports continually able to do more, however pain persists  Feels he is walking better  Objective: See treatment diary below      Assessment: Able to progress weight bearing activities without setbacks  Plan: Continue per plan of care        Precautions: None      Manuals             Patellar ROM austin 5'            Knee flex/ext st AUSTIN 10'                                      Neuro Re-Ed             Quad set             TKE 5# 5s x20            LAQ                                                                 Ther Ex             Recumbent bike 12'            SLR flex/abd             Heel slides 2x10            squats 5s 2x15            FSU/LSU 2x15            SLS foam 5'            Leg press 55" 2x15                                      Ther Activity                                       Gait Training                                       Modalities

## 2020-07-15 ENCOUNTER — OFFICE VISIT (OUTPATIENT)
Dept: OBGYN CLINIC | Facility: HOSPITAL | Age: 60
End: 2020-07-15

## 2020-07-15 VITALS
DIASTOLIC BLOOD PRESSURE: 80 MMHG | HEART RATE: 91 BPM | BODY MASS INDEX: 33.36 KG/M2 | WEIGHT: 246 LBS | SYSTOLIC BLOOD PRESSURE: 131 MMHG

## 2020-07-15 DIAGNOSIS — Z96.651 HISTORY OF KNEE REPLACEMENT, TOTAL, RIGHT: Primary | ICD-10-CM

## 2020-07-15 PROCEDURE — 99024 POSTOP FOLLOW-UP VISIT: CPT | Performed by: ORTHOPAEDIC SURGERY

## 2020-07-15 RX ORDER — CEPHALEXIN 500 MG/1
CAPSULE ORAL
Qty: 12 CAPSULE | Refills: 3 | Status: SHIPPED | OUTPATIENT
Start: 2020-07-15 | End: 2020-07-22

## 2020-07-15 NOTE — PROGRESS NOTES
How Severe Is Your Acne?: mild Subjective;    49-year-old male patient 6 weeks status post right total knee replacement    He presents for clinical exam and follow-up today to evaluate range of motion right knee  He also has a history of previous left total knee replacement    Past Medical History:   Diagnosis Date    Anxiety     Arthritis     Chronic GERD     Hypertension     Peripheral neuropathy        Past Surgical History:   Procedure Laterality Date    BACK SURGERY      lumbar 4-5    ELBOW SURGERY Right     Removal chips    JOINT REPLACEMENT Bilateral     Hip    JOINT REPLACEMENT Left     Knee    KNEE ARTHROSCOPY Left     VA REINSERT BI/TRICEPS TENDON,DISTAL Left 2019    Procedure: DISTAL BICEPS TENDON REPAIR;  Surgeon: Zadi Coffey MD;  [facility];  Service: Orthopedics    VA TOTAL HIP ARTHROPLASTY Right 2016    Procedure: ARTHROPLASTY HIP TOTAL ANTERIOR;  Surgeon: Damon Cade DO;  [facility];  Service: Orthopedics    VA TOTAL KNEE ARTHROPLASTY Left 2016    Procedure: ARTHROPLASTY KNEE TOTAL;  Surgeon: Essence Patterson MD;  [facility];  Service: Orthopedics    VA TOTAL KNEE ARTHROPLASTY Right 2020    Procedure: ARTHROPLASTY KNEE TOTAL;  Surgeon: Essence Patterson MD;  [facility];  Service: Orthopedics       Family History   Problem Relation Age of Onset    Hypertension Mother        Social History     Tobacco Use    Smoking status: Former Smoker     Last attempt to quit: 2020     Years since quittin 1    Smokeless tobacco: Never Used    Tobacco comment: social smoker   Substance Use Topics    Alcohol use: Yes     Frequency: 2-4 times a month     Drinks per session: 1 or 2     Binge frequency: Never     Comment: socially    Drug use: No     Exam;    Well-developed well-nourished  male patient with no acute distress  His right knee has an extension lag of 3-5 degrees but remained supple and can be improved by the examiner    He has Is This A New Presentation, Or A Follow-Up?: Follow Up Acne flexion to greater than 95°  He has slight anterior posterior translation to drawers test  He has no collateral ligamentous laxity of either the MCL or LCL right knee  He has no defect of his incision which is maturing without significant heat or redness    Impression; Follow-up for right total knee replacement  History of left total knee replacement    Plan; He was given did antibiotic profile medication    He is to continue approved promote shin increase strength  We will see him in 6 additional weeks in follow-up, x-rays and exam the x-rays of the right knee at that time

## 2020-07-16 ENCOUNTER — OFFICE VISIT (OUTPATIENT)
Dept: PHYSICAL THERAPY | Facility: REHABILITATION | Age: 60
End: 2020-07-16
Payer: COMMERCIAL

## 2020-07-16 DIAGNOSIS — M17.11 PRIMARY OSTEOARTHRITIS OF RIGHT KNEE: Primary | ICD-10-CM

## 2020-07-16 PROCEDURE — 97140 MANUAL THERAPY 1/> REGIONS: CPT | Performed by: PHYSICAL THERAPIST

## 2020-07-16 PROCEDURE — 97110 THERAPEUTIC EXERCISES: CPT | Performed by: PHYSICAL THERAPIST

## 2020-07-16 NOTE — PROGRESS NOTES
Daily Note     Today's date: 2020  Patient name: Stone Rowland  : 1960  MRN: [id number]  Referring provider: Shiela Marley PA-C  Dx:   Encounter Diagnosis     ICD-10-CM    1  Primary osteoarthritis of right knee M17 11                   Subjective: To see PCP today regarding neuropathic pain in both feet  Objective: See treatment diary below      Assessment: Good tolerance to progression of CKC exercises      Plan: Continue per plan of care        Precautions: None      Manuals            Patellar ROM rene 5' RENE 5'           Knee flex/ext st RENE 10' RENE 10'                                     Neuro Re-Ed             Quad set             TKE 5# 5s x20            LAQ                                                                 Ther Ex             Recumbent bike 12' 12'           SLR flex/abd             Heel slides 2x10            squats 5s 2x15 5s 2x15           FSU/LSU 2x15 2x10           SLS foam 5' 5'           Leg press 55" 2x15 55" 2x15           Side stepping  OTB x5           Lunge    x10           Ther Activity                                       Gait Training                                       Modalities

## 2020-07-20 ENCOUNTER — OFFICE VISIT (OUTPATIENT)
Dept: PHYSICAL THERAPY | Facility: REHABILITATION | Age: 60
End: 2020-07-20
Payer: COMMERCIAL

## 2020-07-20 DIAGNOSIS — M17.11 PRIMARY OSTEOARTHRITIS OF RIGHT KNEE: Primary | ICD-10-CM

## 2020-07-20 PROCEDURE — 97140 MANUAL THERAPY 1/> REGIONS: CPT | Performed by: PHYSICAL THERAPIST

## 2020-07-20 PROCEDURE — 97110 THERAPEUTIC EXERCISES: CPT | Performed by: PHYSICAL THERAPIST

## 2020-07-20 PROCEDURE — 97112 NEUROMUSCULAR REEDUCATION: CPT | Performed by: PHYSICAL THERAPIST

## 2020-07-20 NOTE — PROGRESS NOTES
Daily Note     Today's date: 2020  Patient name: Hair Burt  : 1960  MRN: [id number]  Referring provider: Sherrell Davidson PA-C  Dx:   Encounter Diagnosis     ICD-10-CM    1  Primary osteoarthritis of right knee M17 11                   Subjective: Neuropathic pain remains a limiting factor  Now on [place] via PCP  Objective: See treatment diary below      Assessment: Able to progress despite increased neuropathic pain B feet  Plan: Continue per plan of care        Precautions: None      Manuals           Patellar ROM rene 5' REEN 5' RENE 5'          Knee flex/ext st RENE 10' RENE 10' RENE 10'                                    Neuro Re-Ed             Quad set             TKE 5# 5s x20            LAQ                                                                 Ther Ex             Recumbent bike 12' 12' 12'          SLR flex/abd             Heel slides 2x10            squats 5s 2x15 5s 2x15 5s 2x15          FSU/LSU - 8" 2x15 2x10 3x10          SLS foam 5' 5' 5'          Leg press 55" 2x15 55" 2x15 70# 2x20          Side stepping  OTB x5           Lunge    x10 15          Ther Activity                                       Gait Training                                       Modalities

## 2020-07-23 ENCOUNTER — APPOINTMENT (OUTPATIENT)
Dept: PHYSICAL THERAPY | Facility: REHABILITATION | Age: 60
End: 2020-07-23
Payer: COMMERCIAL

## 2020-07-27 ENCOUNTER — APPOINTMENT (OUTPATIENT)
Dept: PHYSICAL THERAPY | Facility: REHABILITATION | Age: 60
End: 2020-07-27
Payer: COMMERCIAL

## 2020-07-28 ENCOUNTER — OFFICE VISIT (OUTPATIENT)
Dept: PHYSICAL THERAPY | Facility: REHABILITATION | Age: 60
End: 2020-07-28
Payer: COMMERCIAL

## 2020-07-28 DIAGNOSIS — M17.11 PRIMARY OSTEOARTHRITIS OF RIGHT KNEE: Primary | ICD-10-CM

## 2020-07-28 PROCEDURE — 97140 MANUAL THERAPY 1/> REGIONS: CPT | Performed by: PHYSICAL THERAPIST

## 2020-07-28 PROCEDURE — 97110 THERAPEUTIC EXERCISES: CPT | Performed by: PHYSICAL THERAPIST

## 2020-07-28 NOTE — PROGRESS NOTES
Daily Note     Today's date: 2020  Patient name: Hair Burt  : 1960  MRN: [id number]  Referring provider: Sherrell Davidson PA-C  Dx:   Encounter Diagnosis     ICD-10-CM    1  Primary osteoarthritis of right knee M17 11                   Subjective: No new complaints  Reports getting around a lot better  Objective: See treatment diary below      Assessment: Progressing well with all aspects of PT  Plan: Continue per plan of care        Precautions: None      Manuals          Patellar ROM rene 5' RENE 5' RENE 5' RENE 5'         Knee flex/ext st RENE 10' RENE 10' RENE 10' RENE 10'                                   Neuro Re-Ed             Quad set             TKE 5# 5s x20            LAQ                                                                 Ther Ex             Recumbent bike 12' 12' 12' 12'         SLR flex/abd             Heel slides 2x10            squats 5s 2x15 5s 2x15 5s 2x15 5s 2x15         FSU/LSU - 8" 2x15 2x10 3x10 3x10         SLS foam 5' 5' 5' 5'         Leg press 55" 2x15 55" 2x15 70# 2x20 70# 2x20         Side stepping  OTB x5  OTB x5         Lunge    x10 15 3# 2x10         Ther Activity                                       Gait Training                                       Modalities

## 2020-07-30 ENCOUNTER — OFFICE VISIT (OUTPATIENT)
Dept: PHYSICAL THERAPY | Facility: REHABILITATION | Age: 60
End: 2020-07-30
Payer: COMMERCIAL

## 2020-07-30 DIAGNOSIS — M17.11 PRIMARY OSTEOARTHRITIS OF RIGHT KNEE: Primary | ICD-10-CM

## 2020-07-30 PROCEDURE — 97140 MANUAL THERAPY 1/> REGIONS: CPT | Performed by: PHYSICAL THERAPIST

## 2020-07-30 PROCEDURE — 97110 THERAPEUTIC EXERCISES: CPT | Performed by: PHYSICAL THERAPIST

## 2020-07-30 NOTE — PROGRESS NOTES
Daily Note     Today's date: 2020  Patient name: Miguel Finn  : 1960  MRN: [id number]  Referring provider: Nydia Borja PA-C  Dx:   Encounter Diagnosis     ICD-10-CM    1  Primary osteoarthritis of right knee M17 11                   Subjective: No new complaints     Objective: See treatment diary below      Assessment: Continues to show good strength progress      Plan: Continue per plan of care        Precautions: None      Manuals         Patellar ROM austin 5' AUSTIN 5' AUSTIN 5' AUSTIN 5' AUSTIN 5'        Knee flex/ext st AUSTIN 10' AUSTIN 10' AUSTIN 10' AUSTIN 10' AUSTIN 10"                                  Neuro Re-Ed             [employer] 5# 5s x20            LAQ                                                                 Ther Ex             Recumbent bike 12' 12' 12' 12' 12'        Eccentric lower     2x10        Heel slides 2x10            squats 5s 2x15 5s 2x15 5s 2x15 5s 2x15 5s 2x15        FSU/LSU - 8" 2x15 2x10 3x10 3x10 3x10        SLS foam 5' 5' 5' 5' 5'        Leg press 55" 2x15 55" 2x15 70# 2x20 70# 2x20 70# 2x20        Side stepping  OTB x5  OTB x5 OTB x5        Lunge    x10 15 3# 2x10 3# 2x10        Ther Activity                                       Gait Training                                       Modalities

## 2020-08-03 ENCOUNTER — OFFICE VISIT (OUTPATIENT)
Dept: PHYSICAL THERAPY | Facility: REHABILITATION | Age: 60
End: 2020-08-03
Payer: COMMERCIAL

## 2020-08-03 DIAGNOSIS — M17.11 PRIMARY OSTEOARTHRITIS OF RIGHT KNEE: Primary | ICD-10-CM

## 2020-08-03 PROCEDURE — 97140 MANUAL THERAPY 1/> REGIONS: CPT | Performed by: PHYSICAL THERAPIST

## 2020-08-03 PROCEDURE — 97110 THERAPEUTIC EXERCISES: CPT | Performed by: PHYSICAL THERAPIST

## 2020-08-03 NOTE — PROGRESS NOTES
Daily Note     Today's date: 8/3/2020  Patient name: Shawn Florian  : 1960  MRN: [id number]  Referring provider: Xi Carrion PA-C  Dx:   Encounter Diagnosis     ICD-10-CM    1  Primary osteoarthritis of right knee  M17 11                   Subjective: Patient reports no change in status     Objective: See treatment diary below      Assessment: Patient continues to tolerate manual therapy with pain and muscle guarding  Continues to struggle with limited knee extension ROM  Patient tolerated progression in weight for leg press and lunge without issue  He reported fatigue and increase in knee "soreness" post tx  Plan: Continue per plan of care        Precautions: None      Manuals 7/13 7/16 7/20 7/28 7/30 8/3       Patellar ROM austin 5' AUSTIN 5' AUSTIN 5' AUSTIN 5' AUSTIN 5' TB 5'       Knee flex/ext st AUSTIN 10' AUSTIN 10' AUSTIN 10' AUSTIN 10' AUSTIN 10" TB 10'                                 Neuro Re-Ed             Quad set             TKE 5# 5s x20            LAQ                                                                 Ther Ex             Recumbent bike 12' 12' 12' 12' 12' 12'       Eccentric lower     2x10 2x10       Heel slides 2x10            squats 5s 2x15 5s 2x15 5s 2x15 5s 2x15 5s 2x15 5s 2x15       FSU/LSU - 8" 2x15 2x10 3x10 3x10 3x10 3x10       SLS foam 5' 5' 5' 5' 5' 5'       Leg press 55" 2x15 55" 2x15 70# 2x20 70# 2x20 70# 2x20 85# 2x20       Side stepping  OTB x5  OTB x5 OTB x5 OTB x5       Lunge    x10 15 3# 2x10 3# 2x10 5# 2x10       Ther Activity                                       Gait Training                                       Modalities

## 2020-08-06 ENCOUNTER — OFFICE VISIT (OUTPATIENT)
Dept: PHYSICAL THERAPY | Facility: REHABILITATION | Age: 60
End: 2020-08-06
Payer: COMMERCIAL

## 2020-08-06 DIAGNOSIS — M17.11 PRIMARY OSTEOARTHRITIS OF RIGHT KNEE: Primary | ICD-10-CM

## 2020-08-06 PROCEDURE — 97110 THERAPEUTIC EXERCISES: CPT | Performed by: PHYSICAL THERAPIST

## 2020-08-06 PROCEDURE — 97140 MANUAL THERAPY 1/> REGIONS: CPT | Performed by: PHYSICAL THERAPIST

## 2020-08-06 PROCEDURE — 97112 NEUROMUSCULAR REEDUCATION: CPT | Performed by: PHYSICAL THERAPIST

## 2020-08-06 NOTE — PROGRESS NOTES
Daily Note     Today's date: 2020  Patient name: Tucker Harvey  : 1960  MRN: [id number]  Referring provider: Kirby Garcia PA-C  Dx:   Encounter Diagnosis     ICD-10-CM    1  Primary osteoarthritis of right knee  M17 11                   Subjective: Continues to have soreness throughout knee with activities and still having difficulty sleeping      Objective: See treatment diary below      Assessment: Gait has normalized  Progressing well  Plan: Continue per plan of care        Precautions: None      Manuals 7/13 7/16 7/20 7/28 7/30 8/3       Patellar ROM rene 5' RENE 5' RENE 5' RENE 5' RENE 5' TB 5'       Knee flex/ext st RENE 10' RENE 10' RENE 10' RENE 10' RENE 10" TB 10'                                 Neuro Re-Ed             Quad set             TKE 5# 5s x20            LAQ                                                                 Ther Ex             Recumbent bike 12' 12' 12' 12' 12' 12'       Eccentric lower     2x10 2x10       Heel slides 2x10            squats 5s 2x15 5s 2x15 5s 2x15 5s 2x15 5s 2x15 5s 2x15       FSU/LSU - 8" 2x15 2x10 3x10 3x10 3x10 3x10       SLS foam 5' 5' 5' 5' 5' 5'       Leg press 55" 2x15 55" 2x15 70# 2x20 70# 2x20 70# 2x20 85# 2x20       Side stepping  OTB x5  OTB x5 OTB x5 OTB x5       Lunge    x10 15 3# 2x10 3# 2x10 5# 2x10       Ther Activity                                       Gait Training                                       Modalities

## 2020-08-10 ENCOUNTER — APPOINTMENT (OUTPATIENT)
Dept: PHYSICAL THERAPY | Facility: REHABILITATION | Age: 60
End: 2020-08-10
Payer: COMMERCIAL

## 2020-08-11 ENCOUNTER — OFFICE VISIT (OUTPATIENT)
Dept: PHYSICAL THERAPY | Facility: REHABILITATION | Age: 60
End: 2020-08-11
Payer: COMMERCIAL

## 2020-08-11 DIAGNOSIS — M17.11 PRIMARY OSTEOARTHRITIS OF RIGHT KNEE: Primary | ICD-10-CM

## 2020-08-11 PROCEDURE — 97110 THERAPEUTIC EXERCISES: CPT | Performed by: PHYSICAL THERAPIST

## 2020-08-11 PROCEDURE — 97112 NEUROMUSCULAR REEDUCATION: CPT | Performed by: PHYSICAL THERAPIST

## 2020-08-11 PROCEDURE — 97140 MANUAL THERAPY 1/> REGIONS: CPT | Performed by: PHYSICAL THERAPIST

## 2020-08-11 NOTE — PROGRESS NOTES
Daily Note     Today's date: 2020  Patient name: Carrie Mcgee  : 1960  MRN: [id number]  Referring provider: Cinthia Hussein PA-C  Dx:   Encounter Diagnosis     ICD-10-CM    1  Primary osteoarthritis of right knee  M17 11                   Subjective: Generalized soreness persists  Objective: See treatment diary below      Assessment: Progressing well with all aspects of PT  Plan: Continue per plan of care        Precautions: None      Manuals 7/13 7/16 7/20 7/28 7/30 8/3 8/11      Patellar ROM austin 5' AUSTIN 5' AUSTIN 5' AUSTIN 5' AUSTIN 5' TB 5' AUSTIN 5'      Knee flex/ext st AUSTIN 10' AUSTIN 10' AUSTIN 10' AUSTIN 10' AUSTIN 10" TB 10' AUSTIN 5'                                Neuro Re-Ed             Quad set             TKE 5# 5s x20            LAQ                                                                 Ther Ex             Recumbent bike 12' 12' 12' 12' 12' 12' 12'      Eccentric lower     2x10 2x10 2x10      Heel slides 2x10            squats 5s 2x15 5s 2x15 5s 2x15 5s 2x15 5s 2x15 5s 2x15 5s 2x15      FSU/LSU - 8" 2x15 2x10 3x10 3x10 3x10 3x10 3x10      SLS foam 5' 5' 5' 5' 5' 5' 5'      Leg press 55" 2x15 55" 2x15 70# 2x20 70# 2x20 70# 2x20 85# 2x20 85# 2x20      Side stepping  OTB x5  OTB x5 OTB x5 OTB x5 OTB x5      Lunge    x10 15 3# 2x10 3# 2x10 5# 2x10       Ther Activity                                       Gait Training                                       Modalities

## 2020-08-13 ENCOUNTER — OFFICE VISIT (OUTPATIENT)
Dept: PHYSICAL THERAPY | Facility: REHABILITATION | Age: 60
End: 2020-08-13
Payer: COMMERCIAL

## 2020-08-13 DIAGNOSIS — M17.11 PRIMARY OSTEOARTHRITIS OF RIGHT KNEE: Primary | ICD-10-CM

## 2020-08-13 PROCEDURE — 97110 THERAPEUTIC EXERCISES: CPT | Performed by: PHYSICAL THERAPIST

## 2020-08-13 PROCEDURE — 97112 NEUROMUSCULAR REEDUCATION: CPT | Performed by: PHYSICAL THERAPIST

## 2020-08-13 PROCEDURE — 97140 MANUAL THERAPY 1/> REGIONS: CPT | Performed by: PHYSICAL THERAPIST

## 2020-08-13 NOTE — PROGRESS NOTES
Daily Note     Today's date: 2020  Patient name: Anibal Russell  : 1960  MRN: [id number]  Referring provider: Madan Garcia PA-C  Dx:   Encounter Diagnosis     ICD-10-CM    1  Primary osteoarthritis of right knee  M17 11                   Subjective: Patient reports some quad soreness/tightness      Objective: See treatment diary below      Assessment: Good tolerance to strengthening despite soreness  Plan: Continue per plan of care        Precautions: None      Manuals 7/13 7/16 7/20 7/28 7/30 8/3 8/11 8/13     Patellar ROM rene 5' RENE 5' RENE 5' RENE 5' RENE 5' TB 5' RENE 5' RENE 5'     Knee flex/ext st RENE 10' RENE 10' RENE 10' RENE 10' RENE 10" TB 10' [place] 33' RENE 5'                               Neuro Re-Ed             Quad set             TKE 5# 5s x20            LAQ                                                                 Ther Ex             Recumbent bike 12' 12' 12' 12' 12' 12' 12' 12'     Eccentric lower     2x10 2x10 2x10 2x10     Heel slides 2x10            squats 5s 2x15 5s 2x15 5s 2x15 5s 2x15 5s 2x15 5s 2x15 5s 2x15 5s 2x15     FSU/LSU - 8" 2x15 2x10 3x10 3x10 3x10 3x10 3x10 3x10     SLS foam 5' 5' 5' 5' 5' 5' 5' 5'     Leg press 55" 2x15 55" 2x15 70# 2x20 70# 2x20 70# 2x20 85# 2x20 85# 2x20 85# 2x20     Side stepping  OTB x5  OTB x5 OTB x5 OTB x5 OTB x5 GTB x5     Lunge    x10 15 3# 2x10 3# 2x10 5# 2x10  5# 2x10     Ther Activity                                       Gait Training                                       Modalities

## 2020-08-17 ENCOUNTER — OFFICE VISIT (OUTPATIENT)
Dept: PHYSICAL THERAPY | Facility: REHABILITATION | Age: 60
End: 2020-08-17
Payer: COMMERCIAL

## 2020-08-17 DIAGNOSIS — M17.11 PRIMARY OSTEOARTHRITIS OF RIGHT KNEE: Primary | ICD-10-CM

## 2020-08-17 PROCEDURE — 97140 MANUAL THERAPY 1/> REGIONS: CPT | Performed by: PHYSICAL THERAPIST

## 2020-08-17 PROCEDURE — 97110 THERAPEUTIC EXERCISES: CPT | Performed by: PHYSICAL THERAPIST

## 2020-08-17 PROCEDURE — 97112 NEUROMUSCULAR REEDUCATION: CPT | Performed by: PHYSICAL THERAPIST

## 2020-08-17 NOTE — PROGRESS NOTES
Daily Note     Today's date: 2020  Patient name: Aylin Arevalo  : 1960  MRN: [id number]  Referring provider: Katherine Urrutia PA-C  Dx:   Encounter Diagnosis     ICD-10-CM    1  Primary osteoarthritis of right knee  M17 11                   Subjective: Peripatellar soreness persists  Objective: See treatment diary below      Assessment: Better control with eccentric lower  Plan: Continue per plan of care        Precautions: None      Manuals 7/13 7/16 7/20 7/28 7/30 8/3 8/11 8/13 8/17    Patellar ROM rene 5' RENE 5' RENE 5' RENE 5' RENE 5' TB 5' RENE 5' RENE 5' RENE 5'    Knee flex/ext st RENE 10' RENE 10' RENE 10' RENE 10' RENE 10" TB 10' [place] 33' RENE 5' RENE 5'                              Neuro Re-Ed             Quad set             TKE 5# 5s x20            LAQ                                                                 Ther Ex             Recumbent bike 12' 12' 12' 12' 12' 12' 12' 12' 12'    Eccentric lower     2x10 2x10 2x10 2x10 2x10    Heel slides 2x10            squats 5s 2x15 5s 2x15 5s 2x15 5s 2x15 5s 2x15 5s 2x15 5s 2x15 5s 2x15 5s x30 foam    FSU/LSU - 8" 2x15 2x10 3x10 3x10 3x10 3x10 3x10 3x10 3x10    SLS foam 5' 5' 5' 5' 5' 5' 5' 5' 6'    Leg press 55" 2x15 55" 2x15 70# 2x20 70# 2x20 70# 2x20 85# 2x20 85# 2x20 85# 2x20 85# 2x20    Side stepping  OTB x5  OTB x5 OTB x5 OTB x5 OTB x5 GTB x5 GTB x5    Lunge    x10 15 3# 2x10 3# 2x10 5# 2x10  5# 2x10 5# 2x10    Ther Activity                                       Gait Training                                       Modalities

## 2020-08-20 ENCOUNTER — OFFICE VISIT (OUTPATIENT)
Dept: PHYSICAL THERAPY | Facility: REHABILITATION | Age: 60
End: 2020-08-20
Payer: COMMERCIAL

## 2020-08-20 DIAGNOSIS — Z47.1 AFTERCARE FOLLOWING RIGHT KNEE JOINT REPLACEMENT SURGERY: Primary | ICD-10-CM

## 2020-08-20 DIAGNOSIS — Z96.651 AFTERCARE FOLLOWING RIGHT KNEE JOINT REPLACEMENT SURGERY: Primary | ICD-10-CM

## 2020-08-20 DIAGNOSIS — M17.11 PRIMARY OSTEOARTHRITIS OF RIGHT KNEE: Primary | ICD-10-CM

## 2020-08-20 PROCEDURE — 97110 THERAPEUTIC EXERCISES: CPT | Performed by: PHYSICAL THERAPIST

## 2020-08-20 PROCEDURE — 97140 MANUAL THERAPY 1/> REGIONS: CPT | Performed by: PHYSICAL THERAPIST

## 2020-08-20 PROCEDURE — 97112 NEUROMUSCULAR REEDUCATION: CPT | Performed by: PHYSICAL THERAPIST

## 2020-08-20 NOTE — PROGRESS NOTES
Daily Note     Today's date: 2020  Patient name: Zay Tinajero  : 1960  MRN: [id number]  Referring provider: Stephanie Munson PA-C  Dx:   Encounter Diagnosis     ICD-10-CM    1  Primary osteoarthritis of right knee  M17 11                   Subjective: No new complaints      Objective: See treatment diary below      Assessment: Progressing well with all aspects of PT  Plan: Continue per plan of care        Precautions: None      Manuals 7/13 7/16 7/20 7/28 7/30 8/3 8/11 8/13 8/17 8/20   Patellar ROM austin 5' AUSTIN 5' AUSTIN 5' AUSTIN 5' AUSTIN 5' TB 5' AUSTIN 5' AUSTIN 5' AUSTIN 5' austin 5'   Knee flex/ext st AUSTIN 10' AUSTIN 10' AUSTIN 10' AUSTIN 10' AUSTIN 10" TB 10' AUSTIN 5' AUSTIN 5' AUSTIN 5' austin 5'                             Neuro Re-Ed             Quad set             TKE 5# 5s x20            LAQ                                                                 Ther Ex             Recumbent bike 12' 12' 12' 12' 12' 12' 12' 12' 12' 12'   Eccentric lower     2x10 2x10 2x10 2x10 2x10 2x10   Heel slides 2x10            squats 5s 2x15 5s 2x15 5s 2x15 5s 2x15 5s 2x15 5s 2x15 5s 2x15 5s 2x15 5s x30 foam 5s x30   FSU/LSU - 8" 2x15 2x10 3x10 3x10 3x10 3x10 3x10 3x10 3x10 3x10   SLS foam 5' 5' 5' 5' 5' 5' 5' 5' 6' 6'   Leg press 55" 2x15 55" 2x15 70# 2x20 70# 2x20 70# 2x20 85# 2x20 85# 2x20 85# 2x20 85# 2x20 85# 2x20   Side stepping  OTB x5  OTB x5 OTB x5 OTB x5 OTB x5 GTB x5 GTB x5 GTB x5   Lunge    x10 15 3# 2x10 3# 2x10 5# 2x10  5# 2x10 5# 2x10 5# 2x15   Ther Activity                                       Gait Training                                       Modalities

## 2020-08-24 ENCOUNTER — OFFICE VISIT (OUTPATIENT)
Dept: PHYSICAL THERAPY | Facility: REHABILITATION | Age: 60
End: 2020-08-24
Payer: COMMERCIAL

## 2020-08-24 DIAGNOSIS — M17.11 PRIMARY OSTEOARTHRITIS OF RIGHT KNEE: Primary | ICD-10-CM

## 2020-08-24 PROCEDURE — 97140 MANUAL THERAPY 1/> REGIONS: CPT | Performed by: PHYSICAL THERAPIST

## 2020-08-24 PROCEDURE — 97112 NEUROMUSCULAR REEDUCATION: CPT | Performed by: PHYSICAL THERAPIST

## 2020-08-24 PROCEDURE — 97110 THERAPEUTIC EXERCISES: CPT | Performed by: PHYSICAL THERAPIST

## 2020-08-24 NOTE — PROGRESS NOTES
Daily Note     Today's date: 2020  Patient name: Morris Mosley  : 1960  MRN: [id number]  Referring provider: Diego Messina PA-C  Dx:   Encounter Diagnosis     ICD-10-CM    1  Primary osteoarthritis of right knee  M17 11                   Subjective: Patient with no new complaints      Objective: See treatment diary below      Assessment: Strength progressing as expected  Proprioception continues to need improvement  Plan: Continue per plan of care        Precautions: None      Manuals             Patellar ROM rene 5'            Knee flex/ext st RENE 5'                                      Neuro Re-Ed             Quad set             TKE             LAQ                                                                 Ther Ex             Recumbent bike 12'            Eccentric lower 2x10            Heel slides             squats 5s 2x15            FSU/LSU - 8" 2x15            SLS foam 6'            Leg press 85" 2x15            Side stepping BTB x5            Lunge   5# 2x15            Ther Activity                                       Gait Training                                       Modalities

## 2020-08-26 ENCOUNTER — HOSPITAL ENCOUNTER (OUTPATIENT)
Dept: RADIOLOGY | Facility: HOSPITAL | Age: 60
Discharge: HOME | End: 2020-08-26
Attending: ORTHOPAEDIC SURGERY
Payer: COMMERCIAL

## 2020-08-26 ENCOUNTER — OFFICE VISIT (OUTPATIENT)
Dept: OBGYN CLINIC | Facility: HOSPITAL | Age: 60
End: 2020-08-26

## 2020-08-26 VITALS
BODY MASS INDEX: 33.36 KG/M2 | DIASTOLIC BLOOD PRESSURE: 94 MMHG | HEART RATE: 96 BPM | SYSTOLIC BLOOD PRESSURE: 150 MMHG | HEIGHT: 72 IN

## 2020-08-26 DIAGNOSIS — Z96.651 STATUS POST RIGHT KNEE REPLACEMENT: Primary | ICD-10-CM

## 2020-08-26 DIAGNOSIS — Z47.1 AFTERCARE FOLLOWING RIGHT KNEE JOINT REPLACEMENT SURGERY: ICD-10-CM

## 2020-08-26 DIAGNOSIS — Z96.651 AFTERCARE FOLLOWING RIGHT KNEE JOINT REPLACEMENT SURGERY: ICD-10-CM

## 2020-08-26 PROCEDURE — 99024 POSTOP FOLLOW-UP VISIT: CPT | Performed by: ORTHOPAEDIC SURGERY

## 2020-08-26 PROCEDURE — 73560 X-RAY EXAM OF KNEE 1 OR 2: CPT

## 2020-08-26 RX ORDER — PREGABALIN 150 MG/1
150 CAPSULE ORAL 2 TIMES DAILY
COMMUNITY
Start: 2020-08-17 | End: 2022-06-08

## 2020-08-26 RX ORDER — PREGABALIN 75 MG/1
75 CAPSULE ORAL 2 TIMES DAILY
COMMUNITY
Start: 2020-07-16 | End: 2020-08-26

## 2020-08-26 NOTE — PROGRESS NOTES
61 y o male 12 weeks s/p R TKA  Patient states that he continues to have quadriceps tightness and has pain on the lateral aspect of his knee and lateral hip on the right  He has had no fevers or chills  Review of Systems  Review of systems negative unless otherwise specified in HPI    Past Medical History  Past Medical History:   Diagnosis Date    Anxiety     Arthritis     Chronic GERD     Hypertension     Peripheral neuropathy        Past Surgical History  Past Surgical History:   Procedure Laterality Date    BACK SURGERY      lumbar 4-5    ELBOW SURGERY Right     Removal chips    JOINT REPLACEMENT Bilateral     Hip    JOINT REPLACEMENT Left     Knee    KNEE ARTHROSCOPY Left     CO REINSERT BI/TRICEPS TENDON,DISTAL Left 12/4/2019    Procedure: DISTAL BICEPS TENDON REPAIR;  Surgeon: Janki Santo MD;  [facility];  Service: Orthopedics    CO TOTAL HIP ARTHROPLASTY Right 1/12/2016    Procedure: ARTHROPLASTY HIP TOTAL ANTERIOR;  Surgeon: Leatha Rangel DO;  [facility];  Service: Orthopedics    CO TOTAL KNEE ARTHROPLASTY Left 12/12/2016    Procedure: ARTHROPLASTY KNEE TOTAL;  Surgeon: Zabrina Mcclelland MD;  [facility];  Service: Orthopedics    CO TOTAL KNEE ARTHROPLASTY Right 6/1/2020    Procedure: ARTHROPLASTY KNEE TOTAL;  Surgeon: Zabrina Mcclelland MD;  [facility];  Service: Orthopedics       Current Medications  Current Outpatient Medications on File Prior to Visit   Medication Sig Dispense Refill    acetaminophen (TYLENOL) 325 mg tablet Take 2 tablets (650 mg total) by mouth every 6 (six) hours 30 tablet 0    ALPRAZolam (XANAX) 0 25 mg tablet Take by mouth as needed       amLODIPine (NORVASC) 10 mg tablet Take 10 mg by mouth daily       b complex vitamins capsule Take 1 capsule by mouth daily        Biotin 1 MG CAPS Take 1 capsule by mouth daily      enoxaparin (LOVENOX) 40 mg/0 4 mL Inject 0 4 mL (40 mg total) under the skin daily in the early morning for 28 days 28 Syringe 0    methocarbamol (ROBAXIN) 500 mg tablet Take 1 tablet (500 mg total) by mouth every 6 (six) hours 30 tablet 0    Multiple Vitamin (MULTIVITAMIN) capsule Take 1 capsule by mouth daily   Omega-3 Fatty Acids (FISH OIL) 1200 MG CAPS Take 1 capsule by mouth daily      omeprazole (PriLOSEC) 40 MG capsule Take 1 capsule by mouth daily in the early morning       pregabalin (LYRICA) 150 mg capsule Take 150 mg by mouth 2 (two) times a day      triamterene-hydrochlorothiazide (MAXZIDE-25) 37 5-25 mg per tablet Take 1 tablet by mouth daily       [DISCONTINUED] gabapentin (NEURONTIN) 100 mg capsule Take 100 mg by mouth 3 (three) times a day      [DISCONTINUED] pregabalin (LYRICA) 75 mg capsule Take 75 mg by mouth 2 (two) times a day       No current facility-administered medications on file prior to visit  Recent Labs [facility])  0   Lab Value Date/Time    HCT 35 9 (L) 06/02/2020 0459    HCT 43 1 12/29/2015 0919    HGB 12 2 06/02/2020 0459    HGB 15 4 12/29/2015 0919    WBC 14 59 (H) 06/02/2020 0459    WBC 6 83 12/29/2015 0919    INR 0 99 04/29/2020 0942    INR 0 93 12/29/2015 0919    CRP 7 2 (H) 04/29/2020 0942    GLUCOSE 125 12/29/2015 0919    HGBA1C 5 8 (H) 04/29/2020 0942         Physical exam  · General: Awake, Alert, Oriented  · Eyes: Pupils equal, round and reactive to light  · Heart: regular rate and rhythm  · Lungs: No audible wheezing  · Abdomen: soft  RLE:  - Skin incision well healed without erythema, drainage or swelling  - TTP fibular head and greater trochanter   - Knee ROM 0-100 deg flexion/extension  - Motor and sensation intact L3-S1  - Extremity warm and adequately perfused    Imaging  X-ray right knee: hardware intact  Assessment/Plan:   61 y o male 12 weeks s/p R TKA  Patient would benefit from remaining out of work for an additional 4 weeks for further right quadriceps strengthening and knee ROM exercises   Patient offered a greater trochanteric bursa CSI, but patient stated that he did not want any injections at this time      WBAT RLE  PT/OT  Return to clinic in 4 weeks

## 2020-08-27 ENCOUNTER — TELEPHONE (OUTPATIENT)
Dept: OBGYN CLINIC | Facility: HOSPITAL | Age: 60
End: 2020-08-27

## 2020-08-27 ENCOUNTER — TELEPHONE (OUTPATIENT)
Dept: PAIN MEDICINE | Facility: CLINIC | Age: 60
End: 2020-08-27

## 2020-08-27 ENCOUNTER — OFFICE VISIT (OUTPATIENT)
Dept: PHYSICAL THERAPY | Facility: REHABILITATION | Age: 60
End: 2020-08-27
Payer: COMMERCIAL

## 2020-08-27 DIAGNOSIS — M17.11 PRIMARY OSTEOARTHRITIS OF RIGHT KNEE: Primary | ICD-10-CM

## 2020-08-27 PROCEDURE — 97112 NEUROMUSCULAR REEDUCATION: CPT | Performed by: PHYSICAL THERAPIST

## 2020-08-27 PROCEDURE — 97140 MANUAL THERAPY 1/> REGIONS: CPT | Performed by: PHYSICAL THERAPIST

## 2020-08-27 PROCEDURE — 97110 THERAPEUTIC EXERCISES: CPT | Performed by: PHYSICAL THERAPIST

## 2020-08-27 NOTE — TELEPHONE ENCOUNTER
Julito Stone is calling to make sure we received short term disability paperwork from [employer]  I provided him with the fax number for the [place] office [phone]

## 2020-08-27 NOTE — PROGRESS NOTES
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  Patient will benefit from continued PT 2x weekly for 3 weeks then likely d/c to HEP  Impairments: abnormal muscle firing, activity intolerance and pain with function  Functional limitations: Remains OOW  Symptom irritability: lowUnderstanding of Dx/Px/POC: good   Prognosis: good    Goals  Short Term goals - 4 weeks  1  Patient will be independent and compliant with a HEP  MET  2  Patient will report a 50% decrease in pain complaints  PARTIALLY MET  Long Term goals - 8 weeks  1  Patient will report elimination of pain complaints  NOT MET  2  Patient will return to all work related activities without restriction  NOT MET  3  Patient will return to all recreational activities without restriction    PARTIALLY MET      Plan  Patient would benefit from: skilled physical therapy  Planned therapy interventions: manual therapy, joint mobilization, neuromuscular re-education, patient education, postural training, therapeutic exercise and home exercise program  Frequency: 2x week  Duration in visits: 6  Duration in weeks: 3  Treatment plan discussed with: patient        Subjective Evaluation    Quality of life: good    Pain  Current pain ratin  At best pain ratin  At worst pain ratin  Quality: dull ache and tight  Aggravating factors: stair climbing, walking and lifting  Progression: improved    Treatments  Current treatment: physical therapy  Patient Goals  Patient goals for therapy: decreased pain, increased motion, increased strength, independence with ADLs/IADLs, return to sport/leisure activities and return to work          Objective     Active Range of Motion   Left Knee   Normal active range of motion    Right Knee   Flexion: 118 degrees   Extension: -8 degrees with pain    Passive Range of Motion     Right Knee   Flexion: 118 degrees   Extension: -5 degrees     Strength/Myotome Testing     Right Knee   Flexion: 4+  Extension: 4  Quadriceps contraction: good    Additional Strength Details  Fair eccentric quad control with stair descent

## 2020-08-27 NOTE — TELEPHONE ENCOUNTER
Called patient and confirmed we have not received them  He did confirm he just spoke to the phone room and they gave him our fax number  He will call [facility] and give them our fax number to resend us these forms

## 2020-08-27 NOTE — PROGRESS NOTES
Daily Note     Today's date: 2020  Patient name: Tucker Han  : 1960  MRN: [id number]  Referring provider: Day Contreras PA-C  Dx:   Encounter Diagnosis     ICD-10-CM    1  Primary osteoarthritis of right knee  M17 11                   Subjective: Still reporting stiffness with knee flexion and mild pain peripatellar region      Objective: See treatment diary below      Assessment: Tolerated treatment well  Patient demonstrated fatigue post treatment      Plan: Continue per plan of care        Precautions: None      Manuals            Patellar ROM [place] 5' [place] 5'           Knee flex/ext st [place] 5' [place] 5'                                     Neuro Re-Ed             Quad set             TKE             LAQ                                                                 Ther Ex             Recumbent bike 12' 12'           Eccentric lower 2x10 2x10           Heel slides             squats 5s 2x15 5s x30           FSU/LSU - 8" 2x15 2x15           SLS foam 6' 6'           Leg press 85" 2x15 85# 2x15           Side stepping BTB x5 BTB x5           Lunge   5# 2x15 5# 2x15           Ther Activity                                       Gait Training                                       Modalities

## 2020-09-01 ENCOUNTER — OFFICE VISIT (OUTPATIENT)
Dept: PHYSICAL THERAPY | Facility: REHABILITATION | Age: 60
End: 2020-09-01
Payer: COMMERCIAL

## 2020-09-01 DIAGNOSIS — M17.11 PRIMARY OSTEOARTHRITIS OF RIGHT KNEE: Primary | ICD-10-CM

## 2020-09-01 PROCEDURE — 97140 MANUAL THERAPY 1/> REGIONS: CPT | Performed by: PHYSICAL THERAPIST

## 2020-09-01 NOTE — PROGRESS NOTES
Daily Note     Today's date: 2020  Patient name: Renetta Huerta  : 1960  MRN: [id number]  Referring provider: Pedro Osorio PA-C  Dx: No diagnosis found  Subjective: ***      Objective: See treatment diary below      Assessment: Tolerated treatment {Tolerated treatment :}  Patient {assessment:[id number]}      Plan: {PLAN:}     {There is no content from the last Daily Treatment Diary section  }

## 2020-09-01 NOTE — PROGRESS NOTES
Daily Note     Today's date: 2020  Patient name: Doris Cabezas  : 1960  MRN: [id number]  Referring provider: Leandro Larose PA-C  Dx:   Encounter Diagnosis     ICD-10-CM    1  Primary osteoarthritis of right knee  M17 11                   Subjective: Peripatellar discomfort persists      Objective: See treatment diary below      Assessment: ITB tightness evident distall to palpation      Plan: Continue per plan of care        Manuals                  Patellar ROM rene 5' RENE 5'  RENE 5'                 Knee flex/ext st RENE 5' RENE 5'  RENE 5'                                                                  Neuro Re-Ed                       Quad set                       TKE                       LAQ                                                                                                                       Ther Ex                       Recumbent bike 15' 12'  12'                 Eccentric lower 2x10 2x10  2x10                 Heel slides                       squats 5s 2x15 5s x30  5s x30                 FSU/LSU - 8" 2x15 2x15  d/c                 SLS foam 6' 6'  6'                 Leg press 85" 2x15 85# 2x15  85# 2x20                 Side stepping BTB x5 BTB x5  BTB x5                 Lunge   5# 2x15 5# 2x15  5# 2x15                 Ther Activity                                                                       Gait Training                                                                       Modalities

## 2020-09-03 ENCOUNTER — OFFICE VISIT (OUTPATIENT)
Dept: PHYSICAL THERAPY | Facility: REHABILITATION | Age: 60
End: 2020-09-03
Payer: COMMERCIAL

## 2020-09-03 DIAGNOSIS — M17.11 PRIMARY OSTEOARTHRITIS OF RIGHT KNEE: Primary | ICD-10-CM

## 2020-09-03 PROCEDURE — 97140 MANUAL THERAPY 1/> REGIONS: CPT | Performed by: PHYSICAL THERAPIST

## 2020-09-03 NOTE — PROGRESS NOTES
Daily Note     Today's date: 9/3/2020  Patient name: Kyle Herrera  : 1960  MRN: [id number]  Referring provider: Kyle Byrd PA-C  Dx:   Encounter Diagnosis     ICD-10-CM    1  Primary osteoarthritis of right knee  M17 11                   Subjective: No new complaints  Objective: See treatment diary below      Assessment: Should be able to tolerate more progressive dynamic proprioceptive exercise      Plan: Continue per plan of care        Manuals 8/24 8/7  9/1  9/3               Patellar ROM austin 5' AUSTIN 5'  AUSTIN 5'  AUSTIN 5'               Knee flex/ext st AUSTIN 5' AUSTIN 5'  AUSTIN 5'   AUSTIN 5'                                                                Neuro Re-Ed                       Quad set                       TKE                       LAQ                                                                                                                       Ther Ex                       Recumbent bike 15' 12'  12'  12'               Eccentric lower 2x10 2x10  2x10  2x15               Heel slides                       Squats - foam 5s 2x15 5s x30  5s x30  5s x30               FSU/LSU - 8" 2x15 2x15  d/c                 SLS foam 6' 6'  6'  6'               Leg press 85" 2x15 85# 2x15  85# 2x20  100# 2x15               Side stepping BTB x5 BTB x5  BTB x5  BTB x5               Lunge   5# 2x15 5# 2x15  5# 2x15  5# 2x15               Ther Activity                                                                       Gait Training                                                                       Modalities

## 2020-09-08 ENCOUNTER — APPOINTMENT (OUTPATIENT)
Dept: PHYSICAL THERAPY | Facility: REHABILITATION | Age: 60
End: 2020-09-08
Payer: COMMERCIAL

## 2020-09-09 ENCOUNTER — OFFICE VISIT (OUTPATIENT)
Dept: PHYSICAL THERAPY | Facility: REHABILITATION | Age: 60
End: 2020-09-09
Payer: COMMERCIAL

## 2020-09-09 DIAGNOSIS — M17.11 PRIMARY OSTEOARTHRITIS OF RIGHT KNEE: Primary | ICD-10-CM

## 2020-09-09 PROCEDURE — 97140 MANUAL THERAPY 1/> REGIONS: CPT | Performed by: PHYSICAL THERAPIST

## 2020-09-09 NOTE — PROGRESS NOTES
Daily Note     Today's date: 2020  Patient name: Dahlia Epstein  : 1960  MRN: [id number]  Referring provider: Kristopher Ferguson PA-C  Dx:   Encounter Diagnosis     ICD-10-CM    1  Primary osteoarthritis of right knee  M17 11                   Subjective: Pt reports intermittent tightness around his knee cap  Stiffness persists in the mornings  Objective: See treatment diary below      Assessment: Tolerated treatment well  Pt continues to lack ROM in his knee  Patient exhibited good technique with therapeutic exercises      Plan: Continue per plan of care        Manuals 8/24 8/7  9/1  9/3  9/9             Patellar ROM austin 5' AUSTIN 5'  AUSTIN 5'  AUSTIN 5'  TP 4 min             Knee flex/ext st AUSTIN 5' AUSTIN 5'  AUSTIN 5'   AUSTIN 5'   TP 5 min              R HS stretch          TP 2 min                                     Neuro Re-Ed                       Quad set                       TKE                       LAQ                                                                                                                       Ther Ex                       Recumbent bike 15' 12'  12'  12'  12'             Eccentric lower 2x10 2x10  2x10  2x15  2x15             Heel slides                       Squats - foam 5s 2x15 5s x30  5s x30  5s x30  5"x30             FSU/LSU - 8" 2x15 2x15  d/c                 SLS foam 6' 6'  6'  6'  6'             Leg press 85" 2x15 85# 2x15  85# 2x20  100# 2x15  100# 2x15             Side stepping BTB x5 BTB x5  BTB x5  BTB x5  btb x5             Lunge   5# 2x15 5# 2x15  5# 2x15  5# 2x15 5# 2x15             Ther Activity                                                                       Gait Training                                                                       Modalities

## 2020-09-10 ENCOUNTER — OFFICE VISIT (OUTPATIENT)
Dept: PHYSICAL THERAPY | Facility: REHABILITATION | Age: 60
End: 2020-09-10
Payer: COMMERCIAL

## 2020-09-10 DIAGNOSIS — M17.11 PRIMARY OSTEOARTHRITIS OF RIGHT KNEE: Primary | ICD-10-CM

## 2020-09-10 PROCEDURE — 97140 MANUAL THERAPY 1/> REGIONS: CPT | Performed by: PHYSICAL THERAPIST

## 2020-09-10 NOTE — PROGRESS NOTES
Daily Note     Today's date: 9/10/2020  Patient name: Shawn Florian  : 1960  MRN: [id number]  Referring provider: Xi Carrion PA-C  Dx:   Encounter Diagnosis     ICD-10-CM    1  Primary osteoarthritis of right knee  M17 11                   Subjective: No new complaints  Objective: See treatment diary below      Assessment: Continues to have less pain on the lateral aspect of the R knee/      Plan: Continue per plan of care        Manuals 8/24 8/7  9/1  9/3  9/9  9/10           Patellar ROM austin 5' AUSTIN 5'  AUSTIN 5'  AUSTIN 5'  TP 4 min  AUSTIN 4'           Knee flex/ext st AUSTIN 5' AUSTIN 5'  AUSTIN 5'   AUSTIN 5'   TP 5 min  AUSTIN 4'            R HS stretch          TP 2 min                                     Neuro Re-Ed                       Quad set                       TKE                       LAQ                        SLS foam            6'            Squats - foam            MTB x5                                                           Ther Ex                       Recumbent bike 15' 12'  12'  12'  12'  12'           Eccentric lower 2x10 2x10  2x10  2x15  2x15  2x15           Heel slides                        5s 2x15 5s x30  5s x30  5s x30  5"x30  5" x30           FSU/LSU - 8" 2x15 2x15  d/c                  6' 6'  6'  6'  6'  6'            Leg press 85" 2x15 85# 2x15  85# 2x20  100# 2x15  100# 2x15  100# 2x15           Side stepping BTB x5 BTB x5  BTB x5  BTB x5  btb x5             Lunge   5# 2x15 5# 2x15  5# 2x15  5# 2x15 5# 2x15  5# 2x15           Ther Activity                                                                       Gait Training                                                                       Modalities

## 2020-09-14 ENCOUNTER — APPOINTMENT (OUTPATIENT)
Dept: PHYSICAL THERAPY | Facility: REHABILITATION | Age: 60
End: 2020-09-14
Payer: COMMERCIAL

## 2020-09-15 ENCOUNTER — OFFICE VISIT (OUTPATIENT)
Dept: PHYSICAL THERAPY | Facility: REHABILITATION | Age: 60
End: 2020-09-15
Payer: COMMERCIAL

## 2020-09-15 DIAGNOSIS — M17.11 PRIMARY OSTEOARTHRITIS OF RIGHT KNEE: Primary | ICD-10-CM

## 2020-09-15 PROCEDURE — 97140 MANUAL THERAPY 1/> REGIONS: CPT | Performed by: PHYSICAL THERAPIST

## 2020-09-15 NOTE — PROGRESS NOTES
Daily Note     Today's date: 9/15/2020  Patient name: Radha Martinez  : 1960  MRN: [id number]  Referring provider: Susanna Johnson PA-C  Dx:   Encounter Diagnosis     ICD-10-CM    1  Primary osteoarthritis of right knee  M17 11                   Subjective: Continues with intermittent peripatellar pain      Objective: See treatment diary below      Assessment: Good tolerance to resistance exercises despite pain      Plan: Continue per plan of care        Manuals 8/24 8/7  9/1  9/3  9/9  9/10  9/15         Patellar ROM austin 5' AUSTIN 5'  AUSTIN 5'  AUSTIN 5'  TP 4 min  AUSTIN 4'  AUSTIN 4'         Knee flex/ext st AUSTIN 5' AUSTIN 5'  AUSTIN 5'   AUSTIN 5'   TP 5 min  AUSTIN 4'  AUSTIN 4'          R HS stretch          TP 2 min                                     Neuro Re-Ed                       Quad set                       TKE                       LAQ                        SLS foam            6'  6'          Squats - foam            MTB x5  MTB x5                                                         Ther Ex                       Recumbent bike 15' 12'  12'  12'  12'  12'  12'         Eccentric lower 2x10 2x10  2x10  2x15  2x15  2x15  2x15         Heel slides                        5s 2x15 5s x30  5s x30  5s x30  5"x30  5" x30  d/c         FSU/LSU - 8" 2x15 2x15  d/c                  6' 6'  6'  6'  6'  6'   6' 2x15         Leg press 85" 2x15 85# 2x15  85# 2x20  100# 2x15  100# 2x15  100# 2x15  115# 2x15         Side stepping BTB x5 BTB x5  BTB x5  BTB x5  btb x5    btb x5         Lunge   5# 2x15 5# 2x15  5# 2x15  5# 2x15 5# 2x15  5# 2x15  5# 2x15         Ther Activity                                                                       Gait Training                                                                       Modalities

## 2020-09-17 ENCOUNTER — OFFICE VISIT (OUTPATIENT)
Dept: PHYSICAL THERAPY | Facility: REHABILITATION | Age: 60
End: 2020-09-17
Payer: COMMERCIAL

## 2020-09-17 DIAGNOSIS — M17.11 PRIMARY OSTEOARTHRITIS OF RIGHT KNEE: Primary | ICD-10-CM

## 2020-09-17 PROCEDURE — 97140 MANUAL THERAPY 1/> REGIONS: CPT | Performed by: PHYSICAL THERAPIST

## 2020-09-17 NOTE — PROGRESS NOTES
PT Discharge    Today's date: 2020  Patient name: Tucker Han  : 1960  MRN: [id number]  Referring provider: Eleni Hager  Dx:   Encounter Diagnosis     ICD-10-CM    1  Primary osteoarthritis of right knee  M17 11                   Assessment  Assessment details: All formal PT goals have been achieved  Patient to continue with HEP  Functional limitations: NoneUnderstanding of Dx/Px/POC: good   Prognosis: good    Goals  Short Term goals - 4 weeks  1  Patient will be independent and compliant with a HEP  MET  2  Patient will report a 50% decrease in pain complaints  MET    Long Term goals - 8 weeks  1  Patient will report elimination of pain complaints  MET  2  Patient will return to all work related activities without restriction  MET  3  Patient will return to all recreational activities without restriction   MET      Plan  Planned therapy interventions: home exercise program  Treatment plan discussed with: patient        Subjective Evaluation    History of Present Illness  Date of surgery: 2020  Mechanism of injury: surgery  Quality of life: good    Pain  Current pain ratin  At best pain ratin  At worst pain rating: 3  Location: Medial knee  Quality: tight  Progression: improved    Treatments  Current treatment: physical therapy  Patient Goals  Patient goals for therapy: increased strength, decreased edema, decreased pain, increased motion, independence with ADLs/IADLs and return to sport/leisure activities          Objective     Active Range of Motion     Right Knee   Flexion: 122 degrees   Extension: -2 degrees     Passive Range of Motion   Left Knee   Normal passive range of motion    Right Knee   Flexion: 125 degrees   Extension: 0 degrees     Strength/Myotome Testing     Left Knee   Normal strength    Right Knee   Normal strength             Precautions: None    Manuals 8/24 8/7  9/1  9/3  9/9  9/10  9/17         Patellar ROM austin 5' AUSTIN 5'  AUSTIN 5'  AUSTIN 5'  TP 4 min  AUSTIN 4'  RENE 4'         Knee flex/ext st RENE 5' RENE 5'  RENE 5'   RENE 5'   TP 5 min  RENE 4'  RENE 4'          R HS stretch          TP 2 min                                     Neuro Re-Ed                       Quad set                       TKE                       LAQ                        SLS foam            6'  6'          Squats - foam            MTB x5  MTB x5                                                         Ther Ex                       Recumbent bike 15' 12'  12'  12'  12'  12'  12'         Eccentric lower 2x10 2x10  2x10  2x15  2x15  2x15  2x15         Heel slides                         5s 2x15 5s x30  5s x30  5s x30  5"x30  5" x30  d/c         FSU/LSU - 8" 2x15 2x15  d/c                   6' 6'  6'  6'  6'  6'   6' 2x15         Leg press 85" 2x15 85# 2x15  85# 2x20  100# 2x15  100# 2x15  100# 2x15  115# 2x15         Side stepping BTB x5 BTB x5  BTB x5  BTB x5  btb x5    btb x5         Lunge   5# 2x15 5# 2x15  5# 2x15  5# 2x15 5# 2x15  5# 2x15  5# 2x15         Ther Activity                                                                       Gait Training                                                                       Modalities

## 2020-09-23 ENCOUNTER — OFFICE VISIT (OUTPATIENT)
Dept: OBGYN CLINIC | Facility: HOSPITAL | Age: 60
End: 2020-09-23
Payer: COMMERCIAL

## 2020-09-23 VITALS
HEART RATE: 89 BPM | WEIGHT: 255 LBS | BODY MASS INDEX: 34.54 KG/M2 | DIASTOLIC BLOOD PRESSURE: 86 MMHG | SYSTOLIC BLOOD PRESSURE: 127 MMHG | HEIGHT: 72 IN

## 2020-09-23 DIAGNOSIS — Z96.651 AFTERCARE FOLLOWING RIGHT KNEE JOINT REPLACEMENT SURGERY: Primary | ICD-10-CM

## 2020-09-23 DIAGNOSIS — M70.61 TROCHANTERIC BURSITIS OF RIGHT HIP: ICD-10-CM

## 2020-09-23 DIAGNOSIS — Z47.1 AFTERCARE FOLLOWING RIGHT KNEE JOINT REPLACEMENT SURGERY: Primary | ICD-10-CM

## 2020-09-23 PROCEDURE — 99213 OFFICE O/P EST LOW 20 MIN: CPT | Performed by: ORTHOPAEDIC SURGERY

## 2020-09-23 PROCEDURE — 20610 DRAIN/INJ JOINT/BURSA W/O US: CPT | Performed by: ORTHOPAEDIC SURGERY

## 2020-09-23 RX ORDER — AMOXICILLIN AND CLAVULANATE POTASSIUM 875; 125 MG/1; MG/1
TABLET, FILM COATED ORAL
COMMUNITY
Start: 2020-09-14 | End: 2022-06-08

## 2020-09-23 RX ORDER — NABUMETONE 750 MG/1
750 TABLET, FILM COATED ORAL 2 TIMES DAILY
Qty: 60 TABLET | Refills: 1 | Status: SHIPPED | OUTPATIENT
Start: 2020-09-23

## 2020-09-23 RX ORDER — INDOMETHACIN 25 MG/1
CAPSULE ORAL
COMMUNITY
Start: 2020-09-18 | End: 2022-06-08

## 2020-09-23 RX ORDER — LIDOCAINE HYDROCHLORIDE 10 MG/ML
2 INJECTION, SOLUTION INFILTRATION; PERINEURAL
Status: COMPLETED | OUTPATIENT
Start: 2020-09-23 | End: 2020-09-23

## 2020-09-23 RX ORDER — BETAMETHASONE SODIUM PHOSPHATE AND BETAMETHASONE ACETATE 3; 3 MG/ML; MG/ML
12 INJECTION, SUSPENSION INTRA-ARTICULAR; INTRALESIONAL; INTRAMUSCULAR; SOFT TISSUE
Status: COMPLETED | OUTPATIENT
Start: 2020-09-23 | End: 2020-09-23

## 2020-09-23 RX ORDER — BUPIVACAINE HYDROCHLORIDE 2.5 MG/ML
2 INJECTION, SOLUTION INFILTRATION; PERINEURAL
Status: COMPLETED | OUTPATIENT
Start: 2020-09-23 | End: 2020-09-23

## 2020-09-23 RX ADMIN — LIDOCAINE HYDROCHLORIDE 2 ML: 10 INJECTION, SOLUTION INFILTRATION; PERINEURAL at 13:55

## 2020-09-23 RX ADMIN — BETAMETHASONE SODIUM PHOSPHATE AND BETAMETHASONE ACETATE 12 MG: 3; 3 INJECTION, SUSPENSION INTRA-ARTICULAR; INTRALESIONAL; INTRAMUSCULAR; SOFT TISSUE at 13:55

## 2020-09-23 RX ADMIN — BUPIVACAINE HYDROCHLORIDE 2 ML: 2.5 INJECTION, SOLUTION INFILTRATION; PERINEURAL at 13:55

## 2020-09-23 NOTE — LETTER
September 23, 2020     Patient: Mariela West   YOB: 1960   Date of Visit: 9/23/2020       To Whom it May Concern:    Panda Fernando is under my professional care  He was seen in my office on 9/23/2020  He is able to return to work without restrictions as of Monday 9/28/2020  If you have any questions or concerns, please don't hesitate to call           Sincerely,          Jaycob Spangler MD        CC: No Recipients

## 2020-09-23 NOTE — PROGRESS NOTES
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   Overall his knee is functioning well  He will maintain home exercise program   Note provided to return to work next Monday without restrictions  Patient was offered, accepted, performed injection of cortisone to his right trochanteric bursal area for symptomatic relief  Patient tolerated procedure well  Ice and post injection protocol advised  Weightbearing activities as tolerated  Prescription of Relafen will be sent to his pharmacy to take 1 pill twice a day for his right knee symptoms  To do next visit:  Return in about 2 months (around 11/23/2020) for re-check with x-rays right knee  The above stated was discussed in layman's terms and the patient expressed understanding  All questions were answered to the patient's satisfaction  Scribe Attestation    I,:   Gisela Strickland am acting as a scribe while in the presence of the attending physician :        I,:   Obinna Isbell MD personally performed the services described in this documentation    as scribed in my presence :              Subjective:   Radha Martinez is a 61 y o  male who presents for repeat evaluation 4 months status post right total knee arthroplasty  He returns today for return to work note for next week  He notes anterior discomfort in medial sided knee pain at night  He has finished out his formal therapy to a home exercise program   He does feel overall better as result of his knee replacement surgery  Denies any calf or thigh pain  Denies any fevers or chills    He does have pain laterally over his right side of his hip that is exacerbated with lying on his right side at night        Review of systems negative unless otherwise specified in HPI    Past Medical History:   Diagnosis Date    Anxiety     Arthritis     Chronic GERD     Hypertension     Peripheral neuropathy        Past Surgical History:   Procedure Laterality Date    BACK SURGERY      lumbar 4-5    ELBOW SURGERY Right     Removal chips    JOINT REPLACEMENT Bilateral     Hip    JOINT REPLACEMENT Left     Knee    KNEE ARTHROSCOPY Left     SD REINSERT BI/TRICEPS TENDON,DISTAL Left 2019    Procedure: DISTAL BICEPS TENDON REPAIR;  Surgeon: Sukhwinder Self MD;  [facility];  Service: Orthopedics    SD TOTAL HIP ARTHROPLASTY Right 2016    Procedure: ARTHROPLASTY HIP TOTAL ANTERIOR;  Surgeon: Olaf Franklin DO;  [facility];  Service: Orthopedics    SD TOTAL KNEE ARTHROPLASTY Left 2016    Procedure: ARTHROPLASTY KNEE TOTAL;  Surgeon: Hernandez Rojas MD;  [facility];  Service: Orthopedics    SD TOTAL KNEE ARTHROPLASTY Right 2020    Procedure: ARTHROPLASTY KNEE TOTAL;  Surgeon: Hernandez Rojas MD;  [facility];  Service: Orthopedics       Family History   Problem Relation Age of Onset    Hypertension Mother        Social History     Occupational History    Not on file   Tobacco Use    Smoking status: Former Smoker     Last attempt to quit: 2020     Years since quittin 3    Smokeless tobacco: Never Used    Tobacco comment: social smoker   Substance and Sexual Activity    Alcohol use: Yes     Frequency: 2-4 times a month     Drinks per session: 1 or 2     Binge frequency: Never     Comment: socially    Drug use: No    Sexual activity: Not on file         Current Outpatient Medications:     acetaminophen (TYLENOL) 325 mg tablet, Take 2 tablets (650 mg total) by mouth every 6 (six) hours, Disp: 30 tablet, Rfl: 0    ALPRAZolam (XANAX) 0 25 mg tablet, Take by mouth as needed , Disp: , Rfl:     amLODIPine (NORVASC) 10 mg tablet, Take 10 mg by mouth daily , Disp: , Rfl:     amoxicillin-clavulanate (AUGMENTIN) 875-125 mg per tablet, , Disp: , Rfl:     b complex vitamins capsule, Take 1 capsule by mouth daily  , Disp: , Rfl:     Biotin 1 MG CAPS, Take 1 capsule by mouth daily, Disp: , Rfl:     enoxaparin (LOVENOX) 40 mg/0 4 mL, Inject 0 4 mL (40 mg total) under the skin daily in the early morning for 28 days, Disp: 28 Syringe, Rfl: 0    indomethacin (INDOCIN) 25 mg capsule, , Disp: , Rfl:     methocarbamol (ROBAXIN) 500 mg tablet, Take 1 tablet (500 mg total) by mouth every 6 (six) hours, Disp: 30 tablet, Rfl: 0    Multiple Vitamin (MULTIVITAMIN) capsule, Take 1 capsule by mouth daily  , Disp: , Rfl:     Omega-3 Fatty Acids (FISH OIL) 1200 MG CAPS, Take 1 capsule by mouth daily, Disp: , Rfl:     omeprazole (PriLOSEC) 40 MG capsule, Take 1 capsule by mouth daily in the early morning , Disp: , Rfl:     pregabalin (LYRICA) 150 mg capsule, Take 150 mg by mouth 2 (two) times a day, Disp: , Rfl:     triamterene-hydrochlorothiazide (MAXZIDE-25) 37 5-25 mg per tablet, Take 1 tablet by mouth daily , Disp: , Rfl:     Allergies   Allergen Reactions    Oxycodone Itching            Vitals:    09/23/20 1313   BP: 127/86   Pulse: 89       Objective:                    Right Knee Exam     Muscle Strength   The patient has normal right knee strength  Tenderness   Right knee tenderness location: Patellar facets and MCL attachment distally  Range of Motion   The patient has normal right knee ROM  Right knee flexion: Patella tracks well  Tests   Varus: negative Valgus: negative    Other   Erythema: absent  Sensation: normal  Swelling: mild    Comments:    Neutral alignment  Intact extensor mechanism  Healed anterior incision without evidence of infection  Calf and thigh are soft nontender no signs DVT    Non antalgic gait      Right Hip Exam     Tenderness   The patient is experiencing tenderness in the greater trochanter  Range of Motion   The patient has normal right hip ROM  Muscle Strength   The patient has normal right hip strength  Other   Erythema: absent  Sensation: normal            Diagnostics, reviewed and taken today if performed as documented:    None performed          Procedures, if performed today:    Large joint arthrocentesis: R greater trochanteric bursa  Date/Time: 9/23/2020 1:55 PM  Consent given by: patient  Site marked: site marked  Supporting Documentation  Indications: pain   Procedure Details  Location: hip - R greater trochanteric bursa  Preparation: Patient was prepped and draped in the usual sterile fashion  Needle size: 22 G  Ultrasound guidance: no  Approach: lateral  Medications administered: 2 mL lidocaine 1 %; 2 mL bupivacaine 0 25 %; 12 mg betamethasone acetate-betamethasone sodium phosphate 6 (3-3) mg/mL    Patient tolerance: patient tolerated the procedure well with no immediate complications  Dressing:  Sterile dressing applied              Portions of the record may have been created with voice recognition software  Occasional wrong word or "sound a like" substitutions may have occurred due to the inherent limitations of voice recognition software  Read the chart carefully and recognize, using context, where substitutions have occurred

## 2020-11-18 ENCOUNTER — HOSPITAL ENCOUNTER (OUTPATIENT)
Dept: RADIOLOGY | Facility: HOSPITAL | Age: 60
Discharge: HOME | End: 2020-11-18
Payer: COMMERCIAL

## 2020-11-18 ENCOUNTER — TRANSCRIBE ORDERS (OUTPATIENT)
Dept: ADMINISTRATIVE | Facility: HOSPITAL | Age: 60
End: 2020-11-18

## 2020-11-18 ENCOUNTER — APPOINTMENT (OUTPATIENT)
Dept: LAB | Facility: CLINIC | Age: 60
End: 2020-11-18
Payer: COMMERCIAL

## 2020-11-18 ENCOUNTER — TRANSCRIBE ORDERS (OUTPATIENT)
Dept: LAB | Facility: CLINIC | Age: 60
End: 2020-11-18

## 2020-11-18 DIAGNOSIS — R73.09 IMPAIRED GLUCOSE TOLERANCE TEST: ICD-10-CM

## 2020-11-18 DIAGNOSIS — M1A.0790 IDIOPATHIC CHRONIC GOUT OF FOOT WITHOUT TOPHUS, UNSPECIFIED LATERALITY: Primary | ICD-10-CM

## 2020-11-18 DIAGNOSIS — E55.9 AVITAMINOSIS D: ICD-10-CM

## 2020-11-18 DIAGNOSIS — M72.0 CONTRACTURE OF PALMAR FASCIA: ICD-10-CM

## 2020-11-18 DIAGNOSIS — Z79.899 ENCOUNTER FOR LONG-TERM (CURRENT) USE OF OTHER MEDICATIONS: Primary | ICD-10-CM

## 2020-11-18 DIAGNOSIS — M75.51 BURSITIS OF RIGHT SHOULDER: ICD-10-CM

## 2020-11-18 DIAGNOSIS — M15.0 PRIMARY GENERALIZED HYPERTROPHIC OSTEOARTHROSIS: ICD-10-CM

## 2020-11-18 DIAGNOSIS — Z79.899 ENCOUNTER FOR LONG-TERM (CURRENT) USE OF OTHER MEDICATIONS: ICD-10-CM

## 2020-11-18 DIAGNOSIS — M10.9 GOUT, UNSPECIFIED CAUSE, UNSPECIFIED CHRONICITY, UNSPECIFIED SITE: ICD-10-CM

## 2020-11-18 DIAGNOSIS — M1A.0790 IDIOPATHIC CHRONIC GOUT OF FOOT WITHOUT TOPHUS, UNSPECIFIED LATERALITY: ICD-10-CM

## 2020-11-18 LAB
25(OH)D3 SERPL-MCNC: 16.9 NG/ML (ref 30–100)
ALBUMIN SERPL BCP-MCNC: 3.8 G/DL (ref 3.5–5)
ALP SERPL-CCNC: 82 U/L (ref 46–116)
ALT SERPL W P-5'-P-CCNC: 54 U/L (ref 12–78)
ANION GAP SERPL CALCULATED.3IONS-SCNC: 10 MMOL/L (ref 4–13)
AST SERPL W P-5'-P-CCNC: 32 U/L (ref 5–45)
BASOPHILS # BLD AUTO: 0.12 THOUSANDS/ΜL (ref 0–0.1)
BASOPHILS NFR BLD AUTO: 2 % (ref 0–1)
BILIRUB SERPL-MCNC: 0.73 MG/DL (ref 0.2–1)
BILIRUB UR QL STRIP: NEGATIVE
BUN SERPL-MCNC: 19 MG/DL (ref 5–25)
CALCIUM SERPL-MCNC: 8.7 MG/DL (ref 8.3–10.1)
CHLORIDE SERPL-SCNC: 105 MMOL/L (ref 100–108)
CK MB SERPL-MCNC: 1.2 % (ref 0–2.5)
CK MB SERPL-MCNC: 1.9 NG/ML (ref 0–5)
CK SERPL-CCNC: 162 U/L (ref 39–308)
CLARITY UR: CLEAR
CO2 SERPL-SCNC: 27 MMOL/L (ref 21–32)
COLOR UR: YELLOW
CREAT SERPL-MCNC: 0.81 MG/DL (ref 0.6–1.3)
CRP SERPL QL: 7 MG/L
EOSINOPHIL # BLD AUTO: 0.26 THOUSAND/ΜL (ref 0–0.61)
EOSINOPHIL NFR BLD AUTO: 4 % (ref 0–6)
ERYTHROCYTE [DISTWIDTH] IN BLOOD BY AUTOMATED COUNT: 16.1 % (ref 11.6–15.1)
ERYTHROCYTE [SEDIMENTATION RATE] IN BLOOD: 9 MM/HOUR (ref 0–19)
EST. AVERAGE GLUCOSE BLD GHB EST-MCNC: 123 MG/DL
FERRITIN SERPL-MCNC: 26 NG/ML (ref 8–388)
GFR SERPL CREATININE-BSD FRML MDRD: 97 ML/MIN/1.73SQ M
GLUCOSE P FAST SERPL-MCNC: 123 MG/DL (ref 65–99)
GLUCOSE UR STRIP-MCNC: NEGATIVE MG/DL
HBA1C MFR BLD: 5.9 %
HCT VFR BLD AUTO: 43.3 % (ref 36.5–49.3)
HGB BLD-MCNC: 14.5 G/DL (ref 12–17)
HGB UR QL STRIP.AUTO: NEGATIVE
IMM GRANULOCYTES # BLD AUTO: 0.05 THOUSAND/UL (ref 0–0.2)
IMM GRANULOCYTES NFR BLD AUTO: 1 % (ref 0–2)
KETONES UR STRIP-MCNC: NEGATIVE MG/DL
LEUKOCYTE ESTERASE UR QL STRIP: NEGATIVE
LYMPHOCYTES # BLD AUTO: 1.55 THOUSANDS/ΜL (ref 0.6–4.47)
LYMPHOCYTES NFR BLD AUTO: 22 % (ref 14–44)
MCH RBC QN AUTO: 28.6 PG (ref 26.8–34.3)
MCHC RBC AUTO-ENTMCNC: 33.5 G/DL (ref 31.4–37.4)
MCV RBC AUTO: 85 FL (ref 82–98)
MONOCYTES # BLD AUTO: 0.69 THOUSAND/ΜL (ref 0.17–1.22)
MONOCYTES NFR BLD AUTO: 10 % (ref 4–12)
NEUTROPHILS # BLD AUTO: 4.42 THOUSANDS/ΜL (ref 1.85–7.62)
NEUTS SEG NFR BLD AUTO: 61 % (ref 43–75)
NITRITE UR QL STRIP: NEGATIVE
NRBC BLD AUTO-RTO: 0 /100 WBCS
PH UR STRIP.AUTO: 6 [PH]
PLATELET # BLD AUTO: 240 THOUSANDS/UL (ref 149–390)
PMV BLD AUTO: 10.7 FL (ref 8.9–12.7)
POTASSIUM SERPL-SCNC: 3.7 MMOL/L (ref 3.5–5.3)
PROT SERPL-MCNC: 7.1 G/DL (ref 6.4–8.2)
PROT UR STRIP-MCNC: NEGATIVE MG/DL
RBC # BLD AUTO: 5.07 MILLION/UL (ref 3.88–5.62)
SODIUM SERPL-SCNC: 142 MMOL/L (ref 136–145)
SP GR UR STRIP.AUTO: 1.02 (ref 1–1.03)
TSH SERPL DL<=0.05 MIU/L-ACNC: 1.14 UIU/ML (ref 0.36–3.74)
URATE SERPL-MCNC: 8.3 MG/DL (ref 4.2–8)
UROBILINOGEN UR QL STRIP.AUTO: 0.2 E.U./DL
WBC # BLD AUTO: 7.09 THOUSAND/UL (ref 4.31–10.16)

## 2020-11-18 PROCEDURE — 86618 LYME DISEASE ANTIBODY: CPT

## 2020-11-18 PROCEDURE — 83036 HEMOGLOBIN GLYCOSYLATED A1C: CPT

## 2020-11-18 PROCEDURE — 80053 COMPREHEN METABOLIC PANEL: CPT

## 2020-11-18 PROCEDURE — 82306 VITAMIN D 25 HYDROXY: CPT

## 2020-11-18 PROCEDURE — 82728 ASSAY OF FERRITIN: CPT

## 2020-11-18 PROCEDURE — 86430 RHEUMATOID FACTOR TEST QUAL: CPT

## 2020-11-18 PROCEDURE — 86038 ANTINUCLEAR ANTIBODIES: CPT

## 2020-11-18 PROCEDURE — 82550 ASSAY OF CK (CPK): CPT

## 2020-11-18 PROCEDURE — 84443 ASSAY THYROID STIM HORMONE: CPT

## 2020-11-18 PROCEDURE — 81003 URINALYSIS AUTO W/O SCOPE: CPT | Performed by: INTERNAL MEDICINE

## 2020-11-18 PROCEDURE — 84165 PROTEIN E-PHORESIS SERUM: CPT

## 2020-11-18 PROCEDURE — 85025 COMPLETE CBC W/AUTO DIFF WBC: CPT

## 2020-11-18 PROCEDURE — 85652 RBC SED RATE AUTOMATED: CPT

## 2020-11-18 PROCEDURE — 84165 PROTEIN E-PHORESIS SERUM: CPT | Performed by: PATHOLOGY

## 2020-11-18 PROCEDURE — 86140 C-REACTIVE PROTEIN: CPT

## 2020-11-18 PROCEDURE — 86200 CCP ANTIBODY: CPT

## 2020-11-18 PROCEDURE — 36415 COLL VENOUS BLD VENIPUNCTURE: CPT

## 2020-11-18 PROCEDURE — 82553 CREATINE MB FRACTION: CPT

## 2020-11-18 PROCEDURE — 84550 ASSAY OF BLOOD/URIC ACID: CPT

## 2020-11-19 LAB
B BURGDOR IGG+IGM SER-ACNC: 4
RHEUMATOID FACT SER QL LA: NEGATIVE

## 2020-11-20 LAB
ALBUMIN SERPL ELPH-MCNC: 4.33 G/DL (ref 3.5–5)
ALBUMIN SERPL ELPH-MCNC: 63.7 % (ref 52–65)
ALPHA1 GLOB SERPL ELPH-MCNC: 0.27 G/DL (ref 0.1–0.4)
ALPHA1 GLOB SERPL ELPH-MCNC: 4 % (ref 2.5–5)
ALPHA2 GLOB SERPL ELPH-MCNC: 0.65 G/DL (ref 0.4–1.2)
ALPHA2 GLOB SERPL ELPH-MCNC: 9.6 % (ref 7–13)
BETA GLOB ABNORMAL SERPL ELPH-MCNC: 0.55 G/DL (ref 0.4–0.8)
BETA1 GLOB SERPL ELPH-MCNC: 8.1 % (ref 5–13)
BETA2 GLOB SERPL ELPH-MCNC: 5 % (ref 2–8)
BETA2+GAMMA GLOB SERPL ELPH-MCNC: 0.34 G/DL (ref 0.2–0.5)
CCP IGA+IGG SERPL IA-ACNC: 3 UNITS (ref 0–19)
GAMMA GLOB ABNORMAL SERPL ELPH-MCNC: 0.65 G/DL (ref 0.5–1.6)
GAMMA GLOB SERPL ELPH-MCNC: 9.6 % (ref 12–22)
IGG/ALB SER: 1.75 {RATIO} (ref 1.1–1.8)
PROT PATTERN SERPL ELPH-IMP: ABNORMAL
PROT SERPL-MCNC: 6.8 G/DL (ref 6.4–8.2)
RYE IGE QN: NEGATIVE

## 2020-12-23 ENCOUNTER — OFFICE VISIT (OUTPATIENT)
Dept: OBGYN CLINIC | Facility: HOSPITAL | Age: 60
End: 2020-12-23
Payer: COMMERCIAL

## 2020-12-23 ENCOUNTER — HOSPITAL ENCOUNTER (OUTPATIENT)
Dept: RADIOLOGY | Facility: HOSPITAL | Age: 60
Discharge: HOME | End: 2020-12-23
Attending: ORTHOPAEDIC SURGERY
Payer: COMMERCIAL

## 2020-12-23 VITALS
HEIGHT: 72 IN | HEART RATE: 89 BPM | SYSTOLIC BLOOD PRESSURE: 166 MMHG | BODY MASS INDEX: 34.58 KG/M2 | DIASTOLIC BLOOD PRESSURE: 73 MMHG

## 2020-12-23 DIAGNOSIS — Z96.651 AFTERCARE FOLLOWING RIGHT KNEE JOINT REPLACEMENT SURGERY: ICD-10-CM

## 2020-12-23 DIAGNOSIS — Z96.651 AFTERCARE FOLLOWING RIGHT KNEE JOINT REPLACEMENT SURGERY: Primary | ICD-10-CM

## 2020-12-23 DIAGNOSIS — Z47.1 AFTERCARE FOLLOWING RIGHT KNEE JOINT REPLACEMENT SURGERY: Primary | ICD-10-CM

## 2020-12-23 DIAGNOSIS — M70.61 GREATER TROCHANTERIC BURSITIS OF RIGHT HIP: ICD-10-CM

## 2020-12-23 DIAGNOSIS — Z47.1 AFTERCARE FOLLOWING RIGHT KNEE JOINT REPLACEMENT SURGERY: ICD-10-CM

## 2020-12-23 PROCEDURE — 73560 X-RAY EXAM OF KNEE 1 OR 2: CPT

## 2020-12-23 PROCEDURE — 20610 DRAIN/INJ JOINT/BURSA W/O US: CPT | Performed by: ORTHOPAEDIC SURGERY

## 2020-12-23 PROCEDURE — 99213 OFFICE O/P EST LOW 20 MIN: CPT | Performed by: ORTHOPAEDIC SURGERY

## 2020-12-23 RX ORDER — ALLOPURINOL 100 MG/1
TABLET ORAL
COMMUNITY
Start: 2020-12-02

## 2020-12-23 RX ORDER — COLCHICINE 0.6 MG/1
0.6 TABLET ORAL 2 TIMES DAILY
COMMUNITY
Start: 2020-12-02 | End: 2022-06-08

## 2020-12-23 RX ORDER — PREGABALIN 50 MG/1
50 CAPSULE ORAL 2 TIMES DAILY
COMMUNITY
Start: 2020-12-02 | End: 2022-06-08

## 2020-12-23 RX ORDER — LIDOCAINE HYDROCHLORIDE 5 MG/ML
2 INJECTION, SOLUTION INFILTRATION; PERINEURAL
Status: COMPLETED | OUTPATIENT
Start: 2020-12-23 | End: 2020-12-23

## 2020-12-23 RX ORDER — ERGOCALCIFEROL 1.25 MG/1
CAPSULE ORAL
COMMUNITY
Start: 2020-12-02

## 2020-12-23 RX ORDER — ALLOPURINOL 300 MG/1
TABLET ORAL
COMMUNITY
Start: 2020-12-02 | End: 2022-06-08

## 2020-12-23 RX ORDER — BUPIVACAINE HYDROCHLORIDE 2.5 MG/ML
2 INJECTION, SOLUTION INFILTRATION; PERINEURAL
Status: COMPLETED | OUTPATIENT
Start: 2020-12-23 | End: 2020-12-23

## 2020-12-23 RX ORDER — BETAMETHASONE SODIUM PHOSPHATE AND BETAMETHASONE ACETATE 3; 3 MG/ML; MG/ML
6 INJECTION, SUSPENSION INTRA-ARTICULAR; INTRALESIONAL; INTRAMUSCULAR; SOFT TISSUE
Status: COMPLETED | OUTPATIENT
Start: 2020-12-23 | End: 2020-12-23

## 2020-12-23 RX ORDER — CLOTRIMAZOLE AND BETAMETHASONE DIPROPIONATE 10; .64 MG/G; MG/G
CREAM TOPICAL
COMMUNITY
Start: 2020-12-02 | End: 2022-06-08

## 2020-12-23 RX ADMIN — BETAMETHASONE SODIUM PHOSPHATE AND BETAMETHASONE ACETATE 6 MG: 3; 3 INJECTION, SUSPENSION INTRA-ARTICULAR; INTRALESIONAL; INTRAMUSCULAR; SOFT TISSUE at 14:31

## 2020-12-23 RX ADMIN — BUPIVACAINE HYDROCHLORIDE 2 ML: 2.5 INJECTION, SOLUTION INFILTRATION; PERINEURAL at 14:31

## 2020-12-23 RX ADMIN — LIDOCAINE HYDROCHLORIDE 2 ML: 5 INJECTION, SOLUTION INFILTRATION; PERINEURAL at 14:31

## 2021-03-09 ENCOUNTER — TELEPHONE (OUTPATIENT)
Dept: OBGYN CLINIC | Facility: OTHER | Age: 61
End: 2021-03-09

## 2021-03-09 NOTE — TELEPHONE ENCOUNTER
Message review     if this type of  Activity please is him then  So be it      everyone who purchase's a Lift Ticket,  Assumes personal risk     you may inform the patient

## 2021-03-09 NOTE — TELEPHONE ENCOUNTER
Patient had both hips and both knees replaced  Patient last saw you in 12/23/2020  Patient is wondering if he can / is allowed to go skiing       C/b # [phone] , Can leave a message

## 2021-03-31 DIAGNOSIS — Z23 ENCOUNTER FOR IMMUNIZATION: ICD-10-CM

## 2021-04-11 ENCOUNTER — IMMUNIZATIONS (OUTPATIENT)
Dept: FAMILY MEDICINE CLINIC | Facility: HOSPITAL | Age: 61
End: 2021-04-11

## 2021-04-11 DIAGNOSIS — Z23 ENCOUNTER FOR IMMUNIZATION: Primary | ICD-10-CM

## 2021-04-11 PROCEDURE — 0001A SARS-COV-2 / COVID-19 MRNA VACCINE (PFIZER-BIONTECH) 30 MCG: CPT

## 2021-04-11 PROCEDURE — 91300 SARS-COV-2 / COVID-19 MRNA VACCINE (PFIZER-BIONTECH) 30 MCG: CPT

## 2021-05-07 ENCOUNTER — IMMUNIZATIONS (OUTPATIENT)
Dept: FAMILY MEDICINE CLINIC | Facility: HOSPITAL | Age: 61
End: 2021-05-07

## 2021-05-07 DIAGNOSIS — Z23 ENCOUNTER FOR IMMUNIZATION: Primary | ICD-10-CM

## 2021-05-07 PROCEDURE — 91300 SARS-COV-2 / COVID-19 MRNA VACCINE (PFIZER-BIONTECH) 30 MCG: CPT

## 2021-05-07 PROCEDURE — 0002A SARS-COV-2 / COVID-19 MRNA VACCINE (PFIZER-BIONTECH) 30 MCG: CPT

## 2021-06-12 ENCOUNTER — APPOINTMENT (OUTPATIENT)
Dept: LAB | Age: 61
End: 2021-06-12
Payer: COMMERCIAL

## 2021-06-12 ENCOUNTER — TRANSCRIBE ORDERS (OUTPATIENT)
Dept: ADMINISTRATIVE | Age: 61
End: 2021-06-12

## 2021-06-12 DIAGNOSIS — E78.5 HYPERLIPIDEMIA, UNSPECIFIED HYPERLIPIDEMIA TYPE: ICD-10-CM

## 2021-06-12 DIAGNOSIS — R73.01 IMPAIRED FASTING GLUCOSE: ICD-10-CM

## 2021-06-12 DIAGNOSIS — N40.1 BENIGN PROSTATIC HYPERPLASIA WITH URINARY OBSTRUCTION AND OTHER LOWER URINARY TRACT SYMPTOMS: ICD-10-CM

## 2021-06-12 DIAGNOSIS — E55.9 VITAMIN D DEFICIENCY, UNSPECIFIED: ICD-10-CM

## 2021-06-12 DIAGNOSIS — E55.9 VITAMIN D DEFICIENCY, UNSPECIFIED: Primary | ICD-10-CM

## 2021-06-12 DIAGNOSIS — I10 ESSENTIAL HYPERTENSION, MALIGNANT: ICD-10-CM

## 2021-06-12 DIAGNOSIS — M10.9 GOUT, UNSPECIFIED CAUSE, UNSPECIFIED CHRONICITY, UNSPECIFIED SITE: ICD-10-CM

## 2021-06-12 DIAGNOSIS — N13.8 BENIGN PROSTATIC HYPERPLASIA WITH URINARY OBSTRUCTION AND OTHER LOWER URINARY TRACT SYMPTOMS: ICD-10-CM

## 2021-06-12 DIAGNOSIS — Z79.899 ENCOUNTER FOR LONG-TERM (CURRENT) USE OF OTHER MEDICATIONS: ICD-10-CM

## 2021-06-12 LAB
25(OH)D3 SERPL-MCNC: 21.8 NG/ML (ref 30–100)
ALBUMIN SERPL BCP-MCNC: 3.8 G/DL (ref 3.5–5)
ALP SERPL-CCNC: 78 U/L (ref 46–116)
ALT SERPL W P-5'-P-CCNC: 39 U/L (ref 12–78)
ANION GAP SERPL CALCULATED.3IONS-SCNC: 6 MMOL/L (ref 4–13)
AST SERPL W P-5'-P-CCNC: 34 U/L (ref 5–45)
BILIRUB SERPL-MCNC: 0.8 MG/DL (ref 0.2–1)
BUN SERPL-MCNC: 13 MG/DL (ref 5–25)
CALCIUM SERPL-MCNC: 9.1 MG/DL (ref 8.3–10.1)
CHLORIDE SERPL-SCNC: 108 MMOL/L (ref 100–108)
CO2 SERPL-SCNC: 26 MMOL/L (ref 21–32)
CREAT SERPL-MCNC: 0.7 MG/DL (ref 0.6–1.3)
EST. AVERAGE GLUCOSE BLD GHB EST-MCNC: 126 MG/DL
GFR SERPL CREATININE-BSD FRML MDRD: 103 ML/MIN/1.73SQ M
GLUCOSE P FAST SERPL-MCNC: 128 MG/DL (ref 65–99)
HBA1C MFR BLD: 6 %
POTASSIUM SERPL-SCNC: 3.7 MMOL/L (ref 3.5–5.3)
PROT SERPL-MCNC: 7.2 G/DL (ref 6.4–8.2)
PSA SERPL-MCNC: 0.6 NG/ML (ref 0–4)
SODIUM SERPL-SCNC: 140 MMOL/L (ref 136–145)
URATE SERPL-MCNC: 5.7 MG/DL (ref 4.2–8)

## 2021-06-12 PROCEDURE — 36415 COLL VENOUS BLD VENIPUNCTURE: CPT

## 2021-06-12 PROCEDURE — 83036 HEMOGLOBIN GLYCOSYLATED A1C: CPT

## 2021-06-12 PROCEDURE — 80053 COMPREHEN METABOLIC PANEL: CPT

## 2021-06-12 PROCEDURE — G0103 PSA SCREENING: HCPCS

## 2021-06-12 PROCEDURE — 84550 ASSAY OF BLOOD/URIC ACID: CPT

## 2021-06-12 PROCEDURE — 82306 VITAMIN D 25 HYDROXY: CPT

## 2021-06-15 DIAGNOSIS — Z96.651 AFTERCARE FOLLOWING RIGHT KNEE JOINT REPLACEMENT SURGERY: Primary | ICD-10-CM

## 2021-06-15 DIAGNOSIS — Z47.1 AFTERCARE FOLLOWING RIGHT KNEE JOINT REPLACEMENT SURGERY: Primary | ICD-10-CM

## 2022-01-30 ENCOUNTER — HOSPITAL ENCOUNTER (EMERGENCY)
Facility: HOSPITAL | Age: 62
Discharge: HOME | End: 2022-01-30
Attending: EMERGENCY MEDICINE | Admitting: EMERGENCY MEDICINE
Payer: COMMERCIAL

## 2022-01-30 ENCOUNTER — APPOINTMENT (EMERGENCY)
Dept: CT IMAGING | Facility: HOSPITAL | Age: 62
End: 2022-01-30
Payer: COMMERCIAL

## 2022-01-30 ENCOUNTER — APPOINTMENT (EMERGENCY)
Dept: RADIOLOGY | Facility: HOSPITAL | Age: 62
End: 2022-01-30
Payer: COMMERCIAL

## 2022-01-30 VITALS
WEIGHT: 257.72 LBS | SYSTOLIC BLOOD PRESSURE: 124 MMHG | OXYGEN SATURATION: 96 % | HEART RATE: 88 BPM | HEIGHT: 71 IN | BODY MASS INDEX: 36.08 KG/M2 | DIASTOLIC BLOOD PRESSURE: 68 MMHG | TEMPERATURE: 97.9 F | RESPIRATION RATE: 18 BRPM

## 2022-01-30 DIAGNOSIS — E87.6 HYPOKALEMIA: Primary | ICD-10-CM

## 2022-01-30 DIAGNOSIS — R07.9 ACUTE CHEST PAIN: ICD-10-CM

## 2022-01-30 LAB
2HR DELTA HS TROPONIN: 0 NG/L
ALBUMIN SERPL BCP-MCNC: 4.1 G/DL (ref 3.5–5)
ALP SERPL-CCNC: 72 U/L (ref 46–116)
ALT SERPL W P-5'-P-CCNC: 67 U/L (ref 12–78)
ANION GAP SERPL CALCULATED.3IONS-SCNC: 10 MMOL/L (ref 4–13)
AST SERPL W P-5'-P-CCNC: 40 U/L (ref 5–45)
BASOPHILS # BLD MANUAL: 0 THOUSAND/UL (ref 0–0.1)
BASOPHILS NFR MAR MANUAL: 0 % (ref 0–1)
BILIRUB SERPL-MCNC: 0.53 MG/DL (ref 0.2–1)
BUN SERPL-MCNC: 12 MG/DL (ref 5–25)
CALCIUM SERPL-MCNC: 8.9 MG/DL (ref 8.3–10.1)
CARDIAC TROPONIN I PNL SERPL HS: 5 NG/L
CARDIAC TROPONIN I PNL SERPL HS: 5 NG/L
CHLORIDE SERPL-SCNC: 102 MMOL/L (ref 100–108)
CO2 SERPL-SCNC: 26 MMOL/L (ref 21–32)
CREAT SERPL-MCNC: 0.92 MG/DL (ref 0.6–1.3)
D DIMER PPP FEU-MCNC: 0.51 UG/ML FEU
EOSINOPHIL # BLD MANUAL: 0.32 THOUSAND/UL (ref 0–0.4)
EOSINOPHIL NFR BLD MANUAL: 3 % (ref 0–6)
ERYTHROCYTE [DISTWIDTH] IN BLOOD BY AUTOMATED COUNT: 13.1 % (ref 11.6–15.1)
ETHANOL SERPL-MCNC: 267 MG/DL (ref 0–3)
GFR SERPL CREATININE-BSD FRML MDRD: 89 ML/MIN/1.73SQ M
GLUCOSE SERPL-MCNC: 114 MG/DL (ref 65–140)
HCT VFR BLD AUTO: 43.9 % (ref 36.5–49.3)
HGB BLD-MCNC: 15.2 G/DL (ref 12–17)
LIPASE SERPL-CCNC: 90 U/L (ref 73–393)
LYMPHOCYTES # BLD AUTO: 3.56 THOUSAND/UL (ref 0.6–4.47)
LYMPHOCYTES # BLD AUTO: 33 % (ref 14–44)
MCH RBC QN AUTO: 30.6 PG (ref 26.8–34.3)
MCHC RBC AUTO-ENTMCNC: 34.6 G/DL (ref 31.4–37.4)
MCV RBC AUTO: 88 FL (ref 82–98)
MONOCYTES # BLD AUTO: 0.65 THOUSAND/UL (ref 0–1.22)
MONOCYTES NFR BLD: 6 % (ref 4–12)
NEUTROPHILS # BLD MANUAL: 5.39 THOUSAND/UL (ref 1.85–7.62)
NEUTS SEG NFR BLD AUTO: 50 % (ref 43–75)
PLATELET # BLD AUTO: 281 THOUSANDS/UL (ref 149–390)
PLATELET BLD QL SMEAR: ADEQUATE
PMV BLD AUTO: 9.8 FL (ref 8.9–12.7)
POTASSIUM SERPL-SCNC: 3.1 MMOL/L (ref 3.5–5.3)
PROT SERPL-MCNC: 7.6 G/DL (ref 6.4–8.2)
RBC # BLD AUTO: 4.97 MILLION/UL (ref 3.88–5.62)
RBC MORPH BLD: NORMAL
SODIUM SERPL-SCNC: 138 MMOL/L (ref 136–145)
VARIANT LYMPHS # BLD AUTO: 8 %
WBC # BLD AUTO: 10.78 THOUSAND/UL (ref 4.31–10.16)

## 2022-01-30 PROCEDURE — 71275 CT ANGIOGRAPHY CHEST: CPT

## 2022-01-30 PROCEDURE — 99284 EMERGENCY DEPT VISIT MOD MDM: CPT | Performed by: EMERGENCY MEDICINE

## 2022-01-30 PROCEDURE — 83690 ASSAY OF LIPASE: CPT | Performed by: EMERGENCY MEDICINE

## 2022-01-30 PROCEDURE — 82077 ASSAY SPEC XCP UR&BREATH IA: CPT | Performed by: EMERGENCY MEDICINE

## 2022-01-30 PROCEDURE — 99285 EMERGENCY DEPT VISIT HI MDM: CPT

## 2022-01-30 PROCEDURE — 36415 COLL VENOUS BLD VENIPUNCTURE: CPT

## 2022-01-30 PROCEDURE — G1004 CDSM NDSC: HCPCS

## 2022-01-30 PROCEDURE — 85027 COMPLETE CBC AUTOMATED: CPT

## 2022-01-30 PROCEDURE — 96374 THER/PROPH/DIAG INJ IV PUSH: CPT

## 2022-01-30 PROCEDURE — 71045 X-RAY EXAM CHEST 1 VIEW: CPT

## 2022-01-30 PROCEDURE — 93005 ELECTROCARDIOGRAM TRACING: CPT

## 2022-01-30 PROCEDURE — 85379 FIBRIN DEGRADATION QUANT: CPT | Performed by: EMERGENCY MEDICINE

## 2022-01-30 PROCEDURE — 85007 BL SMEAR W/DIFF WBC COUNT: CPT

## 2022-01-30 PROCEDURE — 84484 ASSAY OF TROPONIN QUANT: CPT

## 2022-01-30 PROCEDURE — 80053 COMPREHEN METABOLIC PANEL: CPT

## 2022-01-30 RX ORDER — ACETAMINOPHEN 325 MG/1
975 TABLET ORAL ONCE
Status: COMPLETED | OUTPATIENT
Start: 2022-01-30 | End: 2022-01-30

## 2022-01-30 RX ORDER — POTASSIUM CHLORIDE 20 MEQ/1
40 TABLET, EXTENDED RELEASE ORAL ONCE
Status: COMPLETED | OUTPATIENT
Start: 2022-01-30 | End: 2022-01-30

## 2022-01-30 RX ORDER — FAMOTIDINE 20 MG/1
20 TABLET, FILM COATED ORAL ONCE
Status: COMPLETED | OUTPATIENT
Start: 2022-01-30 | End: 2022-01-30

## 2022-01-30 RX ORDER — MAGNESIUM HYDROXIDE/ALUMINUM HYDROXICE/SIMETHICONE 120; 1200; 1200 MG/30ML; MG/30ML; MG/30ML
30 SUSPENSION ORAL ONCE
Status: COMPLETED | OUTPATIENT
Start: 2022-01-30 | End: 2022-01-30

## 2022-01-30 RX ORDER — ASPIRIN 325 MG
325 TABLET ORAL ONCE
Status: DISCONTINUED | OUTPATIENT
Start: 2022-01-30 | End: 2022-01-30

## 2022-01-30 RX ORDER — KETOROLAC TROMETHAMINE 30 MG/ML
15 INJECTION, SOLUTION INTRAMUSCULAR; INTRAVENOUS ONCE
Status: COMPLETED | OUTPATIENT
Start: 2022-01-30 | End: 2022-01-30

## 2022-01-30 RX ADMIN — IOHEXOL 100 ML: 350 INJECTION, SOLUTION INTRAVENOUS at 20:30

## 2022-01-30 RX ADMIN — ALUMINA, MAGNESIA, AND SIMETHICONE ORAL SUSPENSION REGULAR STRENGTH 30 ML: 1200; 1200; 120 SUSPENSION ORAL at 17:19

## 2022-01-30 RX ADMIN — POTASSIUM CHLORIDE 40 MEQ: 1500 TABLET, EXTENDED RELEASE ORAL at 22:09

## 2022-01-30 RX ADMIN — KETOROLAC TROMETHAMINE 15 MG: 30 INJECTION, SOLUTION INTRAMUSCULAR at 20:45

## 2022-01-30 RX ADMIN — ACETAMINOPHEN 975 MG: 325 TABLET, FILM COATED ORAL at 20:46

## 2022-01-30 RX ADMIN — FAMOTIDINE 20 MG: 20 TABLET, FILM COATED ORAL at 17:19

## 2022-01-30 NOTE — ED PROVIDER NOTES
History  Chief Complaint   Patient presents with    Chest Pain     left sided chest pain started 1 hour ago 10/10 - went down to 5/10 after 1 nitro     69-year-old male presents with a few hours of chest pain described as left-sided anterior chest pain, worse with deep inspiration, patient denies any fever, nausea, vomiting, cough, shortness of breath, no recent trauma, no headache dizziness, no abdominal pain, dysuria, hematuria, constipation, diarrhea, patient reports he has never had a pain like this before, denies any recent travel history or surgery or immobility, patient has no history of PEs or DVTs, patient does report he has drink a significant amount today  Patient denies any current drug use or smoking, does have a past history of smoking and in the past use cocaine years ago  Prior to Admission Medications   Prescriptions Last Dose Informant Patient Reported? Taking? ALPRAZolam (XANAX) 0 25 mg tablet Past Week at Unknown time  Yes Yes   Sig: Take by mouth as needed    Biotin 1 MG CAPS Not Taking at Unknown time  Yes No   Sig: Take 1 capsule by mouth daily   Patient not taking: Reported on 1/30/2022    Multiple Vitamin (MULTIVITAMIN) capsule 1/30/2022 at Unknown time  Yes Yes   Sig: Take 1 capsule by mouth daily     Omega-3 Fatty Acids (FISH OIL) 1200 MG CAPS Not Taking at Unknown time  Yes No   Sig: Take 1 capsule by mouth daily   Patient not taking: Reported on 1/30/2022    acetaminophen (TYLENOL) 325 mg tablet Past Month at Unknown time  No Yes   Sig: Take 2 tablets (650 mg total) by mouth every 6 (six) hours   allopurinol (ZYLOPRIM) 100 mg tablet 1/30/2022 at Unknown time  Yes Yes   Sig: TAKE 1 TABLET PO DAILY TIMES 1 WEEK, THEN 2 PO DAILY TIMES 1 WEEK THEN START 300MG   allopurinol (ZYLOPRIM) 300 mg tablet Not Taking at Unknown time  Yes No   Sig: TAKE 1 TABLET BY MOUTH EVERY DAY AFTER FINISHED  MG   Patient not taking: Reported on 1/30/2022   amLODIPine (NORVASC) 10 mg tablet 1/30/2022 at Unknown time  Yes Yes   Sig: Take 10 mg by mouth daily    amoxicillin-clavulanate (AUGMENTIN) 875-125 mg per tablet Not Taking at Unknown time  Yes No   Patient not taking: Reported on 1/30/2022    b complex vitamins capsule Past Month at Unknown time  Yes Yes   Sig: Take 1 capsule by mouth daily     calcium-vitamin D (OSCAL) 250-125 MG-UNIT per tablet 1/30/2022 at Unknown time  Yes Yes   Sig: Take 1 tablet by mouth daily   clotrimazole-betamethasone (LOTRISONE) 1-0 05 % cream 1/30/2022 at Unknown time  Yes Yes   Sig: PLEASE SEE ATTACHED FOR DETAILED DIRECTIONS   colchicine (COLCRYS) 0 6 mg tablet Not Taking at Unknown time  Yes No   Sig: Take 0 6 mg by mouth 2 (two) times a day   Patient not taking: Reported on 1/30/2022    enoxaparin (LOVENOX) 40 mg/0 4 mL   No No   Sig: Inject 0 4 mL (40 mg total) under the skin daily in the early morning for 28 days   ergocalciferol (VITAMIN D2) 50,000 units   Yes Yes   Sig: TAKE 1 CAPSULE BY MOUTH EVERY WEEK TIMES 12 WEEKS   indomethacin (INDOCIN) 25 mg capsule Past Week at Unknown time  Yes Yes   methocarbamol (ROBAXIN) 500 mg tablet   No No   Sig: Take 1 tablet (500 mg total) by mouth every 6 (six) hours   nabumetone (RELAFEN) 750 mg tablet Not Taking at Unknown time  No No   Sig: Take 1 tablet (750 mg total) by mouth 2 (two) times a day   Patient not taking: Reported on 1/30/2022    omeprazole (PriLOSEC) 40 MG capsule 1/30/2022 at Unknown time  Yes Yes   Sig: Take 1 capsule by mouth daily in the early morning    pregabalin (LYRICA) 150 mg capsule Not Taking at Unknown time  Yes No   Sig: Take 150 mg by mouth 2 (two) times a day   Patient not taking: Reported on 1/30/2022    pregabalin (LYRICA) 50 mg capsule Not Taking at Unknown time  Yes No   Sig: Take 50 mg by mouth 2 (two) times a day   Patient not taking: Reported on 1/30/2022    triamterene-hydrochlorothiazide (MAXZIDE-25) 37 5-25 mg per tablet 1/30/2022 at Unknown time  Yes Yes   Sig: Take 1 tablet by mouth daily       Facility-Administered Medications: None       Past Medical History:   Diagnosis Date    Anxiety     Arthritis     Chronic GERD     Hypertension     Peripheral neuropathy        Past Surgical History:   Procedure Laterality Date    BACK SURGERY      lumbar 4-5    ELBOW SURGERY Right     Removal chips    JOINT REPLACEMENT Bilateral     Hip    JOINT REPLACEMENT Left     Knee    KNEE ARTHROSCOPY Left     MT REINSERT BI/TRICEPS TENDON,DISTAL Left 2019    Procedure: DISTAL BICEPS TENDON REPAIR;  Surgeon: Paramjit Parsons MD;  [facility];  Service: Orthopedics    MT TOTAL HIP ARTHROPLASTY Right 2016    Procedure: ARTHROPLASTY HIP TOTAL ANTERIOR;  Surgeon: Thien Salazar DO;  [facility];  Service: Orthopedics    MT TOTAL KNEE ARTHROPLASTY Left 2016    Procedure: ARTHROPLASTY KNEE TOTAL;  Surgeon: Akin Dockery MD;  [facility];  Service: Orthopedics    MT TOTAL KNEE ARTHROPLASTY Right 2020    Procedure: ARTHROPLASTY KNEE TOTAL;  Surgeon: Akin Dockery MD;  [facility];  Service: Orthopedics       Family History   Problem Relation Age of Onset    Hypertension Mother      I have reviewed and agree with the history as documented  E-Cigarette/Vaping    E-Cigarette Use Never User      E-Cigarette/Vaping Substances     Social History     Tobacco Use    Smoking status: Former Smoker     Quit date: 2020     Years since quittin 7    Smokeless tobacco: Never Used    Tobacco comment: social smoker   Vaping Use    Vaping Use: Never used   Substance Use Topics    Alcohol use: Yes     Alcohol/week: 10 0 standard drinks     Types: 10 Cans of beer per week    Drug use: Yes     Types: Marijuana       Review of Systems   Constitutional: Negative for fever  HENT: Negative for congestion  Eyes: Negative for visual disturbance  Respiratory: Negative for cough and shortness of breath  Cardiovascular: Positive for chest pain  Gastrointestinal: Negative for abdominal pain, diarrhea and vomiting  Endocrine: Negative for polyuria  Genitourinary: Negative for dysuria and hematuria  Musculoskeletal: Negative for myalgias  Neurological: Negative for dizziness and headaches  Physical Exam  Physical Exam  Constitutional:       Appearance: Normal appearance  HENT:      Head: Normocephalic and atraumatic  Right Ear: External ear normal       Left Ear: External ear normal       Mouth/Throat:      Mouth: Mucous membranes are moist       Pharynx: Oropharynx is clear  Eyes:      Conjunctiva/sclera: Conjunctivae normal       Pupils: Pupils are equal, round, and reactive to light  Cardiovascular:      Rate and Rhythm: Normal rate and regular rhythm  Heart sounds: Normal heart sounds  Comments: Chest pain is not reproducible on palpation of the chest wall  Pulmonary:      Breath sounds: Normal breath sounds  Abdominal:      General: Abdomen is flat  There is no distension  Palpations: Abdomen is soft  Musculoskeletal:         General: No deformity  Normal range of motion  Cervical back: Normal range of motion  Skin:     General: Skin is warm and dry  Neurological:      General: No focal deficit present  Mental Status: He is alert and oriented to person, place, and time     Psychiatric:         Mood and Affect: Mood normal          Behavior: Behavior normal          Vital Signs  ED Triage Vitals   Temperature Pulse Respirations Blood Pressure SpO2   01/30/22 1653 01/30/22 1653 01/30/22 1653 01/30/22 1653 01/30/22 1653   97 9 °F (36 6 °C) 69 20 130/63 97 %      Temp Source Heart Rate Source Patient Position - Orthostatic VS BP Location FiO2 (%)   01/30/22 1653 01/30/22 1653 01/30/22 1700 01/30/22 1700 --   Oral Monitor Sitting Right arm       Pain Score       01/30/22 1653       7           Vitals:    01/30/22 1830 01/30/22 1900 01/30/22 1945 01/30/22 2100   BP: 139/74 142/81 158/94 124/68   Pulse: 84 82 80 88   Patient Position - Orthostatic VS:   Lying Lying         Visual Acuity      ED Medications  Medications   potassium chloride (K-DUR,KLOR-CON) CR tablet 40 mEq (has no administration in time range)   aluminum-magnesium hydroxide-simethicone (MYLANTA) oral suspension 30 mL (30 mL Oral Given 1/30/22 1719)   famotidine (PEPCID) tablet 20 mg (20 mg Oral Given 1/30/22 1719)   acetaminophen (TYLENOL) tablet 975 mg (975 mg Oral Given 1/30/22 2046)   ketorolac (TORADOL) injection 15 mg (15 mg Intravenous Given 1/30/22 2045)   iohexol (OMNIPAQUE) 350 MG/ML injection (SINGLE-DOSE) 100 mL (100 mL Intravenous Given 1/30/22 2030)       Diagnostic Studies  Results Reviewed     Procedure Component Value Units Date/Time    HS Troponin I 2hr [[id number]]  (Normal) Collected: 01/30/22 1848    Lab Status: Final result Specimen: Blood from Line Updated: 01/30/22 1922     hs TnI 2hr 5 ng/L      Delta 2hr hsTnI 0 ng/L     D-dimer, quantitative [[id number]]  (Abnormal) Collected: 01/30/22 1734    Lab Status: Final result Specimen: Blood from Arm, Left Updated: 01/30/22 1807     D-Dimer, Quant 0 51 ug/ml FEU     CBC and differential [[id number]]  (Abnormal) Collected: 01/30/22 1659    Lab Status: Final result Specimen: Blood from Line Updated: 01/30/22 1741     WBC 10 78 Thousand/uL      RBC 4 97 Million/uL      Hemoglobin 15 2 g/dL      Hematocrit 43 9 %      MCV 88 fL      MCH 30 6 pg      MCHC 34 6 g/dL      RDW 13 1 %      MPV 9 8 fL      Platelets 323 Thousands/uL     Narrative: This is an appended report  These results have been appended to a previously verified report      Manual Differential(PHLEBS Do Not Order) [[id number]]  (Abnormal) Collected: 01/30/22 1659    Lab Status: Final result Specimen: Blood from Line Updated: 01/30/22 1741     Segmented % 50 %      Lymphocytes % 33 %      Monocytes % 6 %      Eosinophils, % 3 %      Basophils % 0 %      Atypical Lymphocytes % 8 %      Absolute Neutrophils 5 39 Thousand/uL      Lymphocytes Absolute 3 56 Thousand/uL      Monocytes Absolute 0 65 Thousand/uL      Eosinophils Absolute 0 32 Thousand/uL      Basophils Absolute 0 00 Thousand/uL      Total Counted --     RBC Morphology Normal     Platelet Estimate Adequate    HS Troponin 0hr (reflex protocol) [[id number]]  (Normal) Collected: 01/30/22 1659    Lab Status: Final result Specimen: Blood from Line Updated: 01/30/22 1736     hs TnI 0hr 5 ng/L     Lipase [[id number]]  (Normal) Collected: 01/30/22 1659    Lab Status: Final result Specimen: Blood from Line Updated: 01/30/22 1726     Lipase 90 u/L     Comprehensive metabolic panel [[id number]]  (Abnormal) Collected: 01/30/22 1659    Lab Status: Final result Specimen: Blood from Line Updated: 01/30/22 1724     Sodium 138 mmol/L      Potassium 3 1 mmol/L      Chloride 102 mmol/L      CO2 26 mmol/L      ANION GAP 10 mmol/L      BUN 12 mg/dL      Creatinine 0 92 mg/dL      Glucose 114 mg/dL      Calcium 8 9 mg/dL      AST 40 U/L      ALT 67 U/L      Alkaline Phosphatase 72 U/L      Total Protein 7 6 g/dL      Albumin 4 1 g/dL      Total Bilirubin 0 53 mg/dL      eGFR 89 ml/min/1 73sq m     Narrative:      [place] guidelines for Chronic Kidney Disease (CKD):     Stage 1 with normal or high GFR (GFR > 90 mL/min/1 73 square meters)    Stage 2 Mild CKD (GFR = 60-89 mL/min/1 73 square meters)    Stage 3A Moderate CKD (GFR = 45-59 mL/min/1 73 square meters)    Stage 3B Moderate CKD (GFR = 30-44 mL/min/1 73 square meters)    Stage 4 Severe CKD (GFR = 15-29 mL/min/1 73 square meters)    Stage 5 End Stage CKD (GFR <15 mL/min/1 73 square meters)  Note: GFR calculation is accurate only with a steady state creatinine    Ethanol [[id number]]  (Abnormal) Collected: 01/30/22 1703    Lab Status: Final result Specimen: Blood from Arm, Left Updated: 01/30/22 1724     Ethanol Lvl 267 mg/dL                  CTA ED chest PE Study   Final Result by Jose Valdez MD (01/30 2138)      No evidence of pulmonary embolus  No evidence of acute intrathoracic pathology  Workstation performed: [id number]         XR chest 1 view portable    (Results Pending)              Procedures  ECG 12 Lead Documentation Only    Date/Time: 1/30/2022 5:02 PM  Performed by: Marquita Haque MD  Authorized by: Marquita Haque MD     ECG reviewed by me, the ED Provider: yes    Patient location:  ED  Interpretation:     Interpretation: normal    Quality:     Tracing quality:  Limited by artifact  Rate:     ECG rate:  65    ECG rate assessment: normal    Rhythm:     Rhythm: sinus rhythm    Ectopy:     Ectopy: none    QRS:     QRS axis:  Normal  Conduction:     Conduction: normal    ST segments:     ST segments:  Normal  T waves:     T waves: normal               ED Course         SBIRT 22yo+      Most Recent Value   SBIRT (22 yo +)    In order to provide better care to our patients, we are screening all of our patients for alcohol and drug use  Would it be okay to ask you these screening questions? No Filed at: 01/30/2022 4044          [facility]  Number of Diagnoses or Management Options  Diagnosis management comments: Patient workup is thus far unremarkable aside from patient's intoxication  Patient is still complaining of severe left-sided chest pain, wife is now at bedside reports that the pain is severe enough she can't take him home  Patient and patient's wife were both repeatedly requesting a CT scan to further evaluate  Shared decision making was done, CT will be performed to further evaluate possible chest pain in spite of otherwise unremarkable workup  If CT is unremarkable, significant pathology in the chest will have essentially been ruled out, patient will be safe for discharge home into the care of wife due to intoxication        Disposition  Final diagnoses:   Hypokalemia   Acute chest pain     Time reflects when diagnosis was documented in both MDM as applicable and the Disposition within this note     Time User Action Codes Description Comment    1/30/2022  9:54 PM Kana Plush Add [E87 6] Hypokalemia     1/30/2022  9:54 PM Kana Fremont Add [R07 9] Acute chest pain       ED Disposition     ED Disposition Condition Date/Time Comment    Discharge Stable Sun Jan 30, 2022  9:54 PM Anibal Saliva discharge to the care of his wife at bedside  Follow-up Information     Follow up With Specialties Details Why Contact Info Additional Information    Malina Jerez DO Family Medicine Schedule an appointment as soon as possible for a visit in 3 days For follow-up [address]  [phone]       [address] Emergency Department Emergency Medicine Go to  As needed [address] [facility] [address] [facility] Emergency Department,  [address], [place], [place], [address] [place] [address] Cardiology Schedule an appointment as soon as possible for a visit in 3 days For follow-up [facility] [address] [facility] [id number] [address] Cardiology [place], [facility] [address] [place], [place], [address] [place] [address] Ave          Patient's Medications   Discharge Prescriptions    No medications on file       No discharge procedures on file      PDMP Review       Value Time User    PDMP Reviewed  Yes 6/3/2020  9:29 AM Adore Sampson MD          ED Provider  Electronically Signed by           Harley Bravo MD  01/30/22 7411

## 2022-01-31 LAB
ATRIAL RATE: 78 BPM
P AXIS: 60 DEGREES
PR INTERVAL: 196 MS
QRS AXIS: 5 DEGREES
QRSD INTERVAL: 100 MS
QT INTERVAL: 398 MS
QTC INTERVAL: 414 MS
T WAVE AXIS: 55 DEGREES
VENTRICULAR RATE: 65 BPM

## 2022-01-31 PROCEDURE — 93010 ELECTROCARDIOGRAM REPORT: CPT | Performed by: INTERNAL MEDICINE

## 2022-03-02 ENCOUNTER — OFFICE VISIT (OUTPATIENT)
Dept: CARDIOLOGY CLINIC | Facility: CLINIC | Age: 62
End: 2022-03-02
Payer: COMMERCIAL

## 2022-03-02 VITALS
HEART RATE: 84 BPM | WEIGHT: 246 LBS | HEIGHT: 71 IN | SYSTOLIC BLOOD PRESSURE: 140 MMHG | BODY MASS INDEX: 34.44 KG/M2 | DIASTOLIC BLOOD PRESSURE: 70 MMHG

## 2022-03-02 DIAGNOSIS — E78.2 MODERATE MIXED HYPERLIPIDEMIA NOT REQUIRING STATIN THERAPY: Primary | ICD-10-CM

## 2022-03-02 DIAGNOSIS — E78.2 MIXED HYPERLIPIDEMIA: ICD-10-CM

## 2022-03-02 PROCEDURE — 99203 OFFICE O/P NEW LOW 30 MIN: CPT | Performed by: INTERNAL MEDICINE

## 2022-03-02 RX ORDER — MELOXICAM 7.5 MG/1
TABLET ORAL
COMMUNITY
Start: 2022-02-23 | End: 2022-06-08

## 2022-03-02 RX ORDER — GABAPENTIN 100 MG/1
CAPSULE ORAL
COMMUNITY
Start: 2022-02-15

## 2022-03-02 RX ORDER — ROSUVASTATIN CALCIUM 10 MG/1
10 TABLET, COATED ORAL DAILY
Qty: 90 TABLET | Refills: 0 | Status: SHIPPED | OUTPATIENT
Start: 2022-03-02 | End: 2022-06-08 | Stop reason: SDUPTHER

## 2022-03-02 NOTE — PROGRESS NOTES
Outpatient Consultation - General Cardiology   Kyle Herrera 64 y o  male   MRN: [id number]  Encounter: [id number]      PCP: Gil Calabrese DO    History of Present Illness   Physician Requesting Consult: Consults   Reason for Consult / Principal Problem: chest pain    HPI: Kyle Herrera is a 64y o  year old male who presents to Cardiology for evaluation of chest pain which prompted an ED visit on January 30th at [facility]  He reports he was sitting at home after he was at the bar earlier in the day when the pain started  He describes it as constant left sided chest pressure, alternating with pain in the left shoulder area  The pain did get worse with pressing on his chest and during the night time, but did not seem to be associated with exertion, shortness of breath, palpitations, or dizziness  The primary alleviating factor was NSAID therapy which he received in the ED and which was prescribed by his primary doctor  Currently, he reports no chest pain, but continues to have some very mild pain in the shoulder  He notes a family history of MI in his brother in age 46s, and states he smoked cigarettes for 20+ years, although only a few cigarettes daily  Review of Systems  Review of system was conducted and was negative except for as stated in the HPI        Historical Information   Past Medical History:   Diagnosis Date    Anxiety     Arthritis     Chronic GERD     Hypertension     Peripheral neuropathy      Past Surgical History:   Procedure Laterality Date    BACK SURGERY      lumbar 4-5    ELBOW SURGERY Right     Removal chips    JOINT REPLACEMENT Bilateral     Hip    JOINT REPLACEMENT Left     Knee    KNEE ARTHROSCOPY Left     WA REINSERT BI/TRICEPS TENDON,DISTAL Left 12/4/2019    Procedure: DISTAL BICEPS TENDON REPAIR;  Surgeon: Jewel Carson MD;  [facility];  Service: Orthopedics    WA TOTAL HIP ARTHROPLASTY Right 1/12/2016    Procedure: ARTHROPLASTY HIP TOTAL ANTERIOR; Surgeon: Diana Barr DO;  [facility];  Service: Orthopedics    NM TOTAL KNEE ARTHROPLASTY Left 2016    Procedure: ARTHROPLASTY KNEE TOTAL;  Surgeon: Jay Rizvi MD;  [facility];  Service: Orthopedics    NM TOTAL KNEE ARTHROPLASTY Right 2020    Procedure: ARTHROPLASTY KNEE TOTAL;  Surgeon: Jay Rizvi MD;  [facility];  Service: Orthopedics     Social History     Substance and Sexual Activity   Alcohol Use Yes    Alcohol/week: 10 0 standard drinks    Types: 10 Cans of beer per week     Social History     Substance and Sexual Activity   Drug Use Yes    Types: Marijuana     Social History     Tobacco Use   Smoking Status Former Smoker    Quit date: 2020    Years since quittin 7   Smokeless Tobacco Never Used   Tobacco Comment    social smoker     Family History:   Family History   Problem Relation Age of Onset    Hypertension Mother        Meds/Allergies   Home Medications:   Current Outpatient Medications:     acetaminophen (TYLENOL) 325 mg tablet, Take 2 tablets (650 mg total) by mouth every 6 (six) hours, Disp: 30 tablet, Rfl: 0    allopurinol (ZYLOPRIM) 100 mg tablet, TAKE 1 TABLET PO DAILY TIMES 1 WEEK, THEN 2 PO DAILY TIMES 1 WEEK THEN START 300MG, Disp: , Rfl:     allopurinol (ZYLOPRIM) 300 mg tablet, TAKE 1 TABLET BY MOUTH EVERY DAY AFTER FINISHED  MG (Patient not taking: Reported on 2022), Disp: , Rfl:     ALPRAZolam (XANAX) 0 25 mg tablet, Take by mouth as needed , Disp: , Rfl:     amLODIPine (NORVASC) 10 mg tablet, Take 10 mg by mouth daily , Disp: , Rfl:     amoxicillin-clavulanate (AUGMENTIN) 875-125 mg per tablet, , Disp: , Rfl:     b complex vitamins capsule, Take 1 capsule by mouth daily  , Disp: , Rfl:     Biotin 1 MG CAPS, Take 1 capsule by mouth daily (Patient not taking: Reported on 2022 ), Disp: , Rfl:     calcium-vitamin D (OSCAL) 250-125 MG-UNIT per tablet, Take 1 tablet by mouth daily, Disp: , Rfl:    clotrimazole-betamethasone (LOTRISONE) 1-0 05 % cream, PLEASE SEE ATTACHED FOR DETAILED DIRECTIONS, Disp: , Rfl:     colchicine (COLCRYS) 0 6 mg tablet, Take 0 6 mg by mouth 2 (two) times a day (Patient not taking: Reported on 1/30/2022 ), Disp: , Rfl:     enoxaparin (LOVENOX) 40 mg/0 4 mL, Inject 0 4 mL (40 mg total) under the skin daily in the early morning for 28 days, Disp: 28 Syringe, Rfl: 0    ergocalciferol (VITAMIN D2) 50,000 units, TAKE 1 CAPSULE BY MOUTH EVERY WEEK TIMES 12 WEEKS, Disp: , Rfl:     indomethacin (INDOCIN) 25 mg capsule, , Disp: , Rfl:     methocarbamol (ROBAXIN) 500 mg tablet, Take 1 tablet (500 mg total) by mouth every 6 (six) hours, Disp: 30 tablet, Rfl: 0    Multiple Vitamin (MULTIVITAMIN) capsule, Take 1 capsule by mouth daily  , Disp: , Rfl:     nabumetone (RELAFEN) 750 mg tablet, Take 1 tablet (750 mg total) by mouth 2 (two) times a day (Patient not taking: Reported on 1/30/2022 ), Disp: 60 tablet, Rfl: 1    Omega-3 Fatty Acids (FISH OIL) 1200 MG CAPS, Take 1 capsule by mouth daily (Patient not taking: Reported on 1/30/2022 ), Disp: , Rfl:     omeprazole (PriLOSEC) 40 MG capsule, Take 1 capsule by mouth daily in the early morning , Disp: , Rfl:     pregabalin (LYRICA) 150 mg capsule, Take 150 mg by mouth 2 (two) times a day (Patient not taking: Reported on 1/30/2022 ), Disp: , Rfl:     pregabalin (LYRICA) 50 mg capsule, Take 50 mg by mouth 2 (two) times a day (Patient not taking: Reported on 1/30/2022 ), Disp: , Rfl:     triamterene-hydrochlorothiazide (MAXZIDE-25) 37 5-25 mg per tablet, Take 1 tablet by mouth daily , Disp: , Rfl:     Allergies   Allergen Reactions    Oxycodone Itching         Objective   Vitals: There were no vitals taken for this visit        Physical Exam    GEN: Mustapha Burnett appears well, alert and oriented x 3, pleasant and cooperative   HEENT:  Normocephalic, atraumatic, anicteric, moist mucous membranes  NECK: No JVD or carotid bruits   HEART: Irregular rhythm, normal rate, normal S1 and S2, no murmurs, clicks, gallops or rubs   LUNGS: Clear to auscultation bilaterally; no wheezes, rales, or rhonchi; respiration nonlabored   ABDOMEN:  Normoactive bowel sounds, soft, no tenderness, no distention  EXTREMITIES: peripheral pulses palpable; no edema  NEURO: no gross focal findings; cranial nerves grossly intact   SKIN:  Dry, intact, warm to touch    Lab Results: I have personally reviewed pertinent lab results  No results found for: CHOL, TRIG, HDL, LDLCALC  Lab Results   Component Value Date     12/29/2015    K 3 1 (L) 01/30/2022    CO2 26 01/30/2022     01/30/2022    BUN 12 01/30/2022    CREATININE 0 92 01/30/2022    ALT 67 01/30/2022    AST 40 01/30/2022     Lab Results   Component Value Date    WBC 10 78 (H) 01/30/2022    HGB 15 2 01/30/2022    HCT 43 9 01/30/2022    MCV 88 01/30/2022     01/30/2022     Lab Results   Component Value Date    INR 0 99 04/29/2020         Imaging: I have personally reviewed pertinent reports  EKG:   Date: 1/30/2022  Interpretation: Sinus arrythmia, no ischemic changes      Assessment/Plan     Chest Pain, likely musculoskeletal    Coronary artery calcification    Hypertension    Hyperlipidemia    Pre-DM    Class II Obesity    64year old male presents for evaluation for chest pain which began late January  He describes features most consistent with musculoskeletal pain: non-exertional, constant in duration, reproducibility, and relief with NSAIDs  Review of his ECG from his ED visit shows sinus arrythmia with normal QRS axis, intervals, and no ischemic changes  It appeared essentially unchanged from prior ECGs performed on him in the past     He does have multiple risk factors for coronary disease including hypertension (currently well controlled on amlodipine), hyperlipidemia, obesity and pre-diabetes      Review of his CT images from his ED visit does demonstrate calcifications of both the left and right coronary arteries, as well as aortic atherosclerosis  His overall 10-year ASCVD risk is very elevated at 14%  He will benefit most from initiation of moderate intensity statin therapy with Rosuvastatin 10mg  He is aware of the side effects to watch for and baseline liver function is normal      BP is generally well controlled on Amlodipine-Triamterene although slightly elevated this visit  Advised home monitoring and keeping a diary of pressures  Will not pursue any cardiac testing at this time  Follow up in 3 months with repeat lipid panel  Advised to contact me if worsening chest pain or new onset shortness of breath develop       Kaleb Obando MD

## 2022-05-06 NOTE — PROGRESS NOTES
Assessment/Plan:  Right knee osteoarthritis, pes anserine bursitis    Cortisone injection to the right knee as well as the right pes answering bursa provided today for symptom relief  Follow-up in April for repeat injection prior to his golf trip     There are no diagnoses linked to this encounter  Subjective:      Patient ID: Rosalino Kenny is a 61 y o  male  63-year-old male with history of right knee osteoarthritis previously seen in the orthopedic office 3 weeks prior and underwent injection and aspiration of right knee  Since that time, he has experienced ongoing medial sided tenderness occasional pain with weight-bearing  He has a history of prior left total knee arthroplasty and understands the progression of the arthritic knee in the sense that he may need a right total knee replacement at some point  He is here today discussion options moving forward  The following portions of the patient's history were reviewed and updated as appropriate: allergies, current medications, past family history, past medical history, past social history, past surgical history and problem list     Review of Systems   Constitutional: Negative  Negative for chills and fever  HENT: Negative  Respiratory: Negative  Negative for shortness of breath and wheezing  Cardiovascular: Negative  Negative for chest pain and palpitations  Gastrointestinal: Negative  Negative for nausea and vomiting  Endocrine: Negative  Genitourinary: Negative  Skin: Negative  Allergic/Immunologic: Negative  Neurological: Negative  Negative for numbness  Hematological: Negative  Psychiatric/Behavioral: Negative  Objective:      /72   Pulse 89   Ht 6' (1 829 m)   Wt 115 kg (252 lb 9 6 oz)   BMI 34 26 kg/m²          Physical Exam   Constitutional: He is oriented to person, place, and time  He appears well-developed and well-nourished  HENT:   Head: Normocephalic and atraumatic     Neck: Normal range of motion  Cardiovascular: Normal rate and intact distal pulses  Pulmonary/Chest: Effort normal  He has no wheezes  Abdominal: Soft  He exhibits no distension  Musculoskeletal:   Right knee:  -joint line tenderness over the medial aspect  -normal patellar tracking, stable varus and valgus stress  -tenderness over pes anserine bursa  -motor and sensory grossly intact, well-perfused extremity   Neurological: He is alert and oriented to person, place, and time  Skin: Skin is warm and dry  Psychiatric: He has a normal mood and affect  [Negative] : Endocrine

## 2022-06-02 ENCOUNTER — APPOINTMENT (OUTPATIENT)
Dept: LAB | Facility: CLINIC | Age: 62
End: 2022-06-02
Payer: COMMERCIAL

## 2022-06-02 ENCOUNTER — TELEPHONE (OUTPATIENT)
Dept: CARDIOLOGY CLINIC | Facility: CLINIC | Age: 62
End: 2022-06-02

## 2022-06-02 DIAGNOSIS — E78.2 MODERATE MIXED HYPERLIPIDEMIA NOT REQUIRING STATIN THERAPY: ICD-10-CM

## 2022-06-02 LAB
CHOLEST SERPL-MCNC: 184 MG/DL
HDLC SERPL-MCNC: 51 MG/DL
LDLC SERPL CALC-MCNC: 97 MG/DL (ref 0–100)
TRIGL SERPL-MCNC: 180 MG/DL

## 2022-06-02 PROCEDURE — 80061 LIPID PANEL: CPT

## 2022-06-02 PROCEDURE — 36415 COLL VENOUS BLD VENIPUNCTURE: CPT

## 2022-06-02 NOTE — TELEPHONE ENCOUNTER
Nathan Falcon said he needs to go for blood work but can't go in the AM  He ate a light breakfast at 7:30 Am and wants to go for lab draw at 5:00 PM   I said he should be fine

## 2022-06-08 ENCOUNTER — OFFICE VISIT (OUTPATIENT)
Dept: CARDIOLOGY CLINIC | Facility: CLINIC | Age: 62
End: 2022-06-08
Payer: COMMERCIAL

## 2022-06-08 VITALS
HEART RATE: 76 BPM | WEIGHT: 247 LBS | HEIGHT: 71 IN | DIASTOLIC BLOOD PRESSURE: 80 MMHG | SYSTOLIC BLOOD PRESSURE: 128 MMHG | BODY MASS INDEX: 34.58 KG/M2

## 2022-06-08 DIAGNOSIS — R73.03 PREDIABETES: Primary | ICD-10-CM

## 2022-06-08 DIAGNOSIS — E78.2 MIXED HYPERLIPIDEMIA: ICD-10-CM

## 2022-06-08 PROCEDURE — 99214 OFFICE O/P EST MOD 30 MIN: CPT | Performed by: INTERNAL MEDICINE

## 2022-06-08 RX ORDER — ROSUVASTATIN CALCIUM 10 MG/1
10 TABLET, COATED ORAL DAILY
Qty: 90 TABLET | Refills: 2 | Status: SHIPPED | OUTPATIENT
Start: 2022-06-08 | End: 2022-09-06

## 2022-06-08 NOTE — PROGRESS NOTES
General Cardiology - Outpatient Progress Note   Jeremy Ortega 64 y o  male   MRN: [id number]  @ Encounter: [id number]    Gamaliel Bal DO  Consults    Interval History:   Since last encounter, patient reports no further symptoms  Denies lightheadedness, dizziness, syncope, headache, vision changes, diaphoresis, chest pain, palpitations, shortness of breath, PND, orthopnea, nausea, vomiting, abdominal pain or lower extremity edema  He has been trying to lose weight with diet changes, but reports still consuming candy and sugary drinks occasionally  Cardiac History Summary:   Jeremy Ortega is a 64y o  year old male who initially presented for evaluation after an episode of musculoskeletal chest pain and found to have coronary calcifications on CT with elevated ASCVD risk  I am seeing him for risk factor modification  Objective:  Review of Systems  Review of system was conducted and was negative except for as stated in the interval history      Physical Exam:  Vitals: Blood pressure 128/80, pulse 76, height 5' 11" (1 803 m), weight 112 kg (247 lb)  , Body mass index is 34 45 kg/m²      GEN: Jeremy Ortega appears well, alert and oriented x 3, pleasant and cooperative   HEENT:  Normocephalic, atraumatic, anicteric, moist mucous membranes  NECK:  No JVD or carotid bruits   HEART: reg rate and rhythm, no murmur  LUNGS: Clear to auscultation bilaterally; no wheezes, rales, or rhonchi; respiration nonlabored   ABDOMEN:  Normoactive bowel sounds, soft, no tenderness, no distention  EXTREMITIES: radial pulses palpable no edema    Home Medications: (Not in a hospital admission)      Current Outpatient Medications:     allopurinol (ZYLOPRIM) 100 mg tablet, TAKE 1 TABLET PO DAILY TIMES 1 WEEK, THEN 2 PO DAILY TIMES 1 WEEK THEN START 300MG, Disp: , Rfl:     ALPRAZolam (XANAX) 0 25 mg tablet, Take by mouth as needed , Disp: , Rfl:     amLODIPine (NORVASC) 10 mg tablet, Take 10 mg by mouth daily , Disp: , Rfl:     b complex vitamins capsule, Take 1 capsule by mouth daily  , Disp: , Rfl:     calcium-vitamin D (OSCAL) 250-125 MG-UNIT per tablet, Take 1 tablet by mouth daily, Disp: , Rfl:     gabapentin (NEURONTIN) 100 mg capsule, , Disp: , Rfl:     Multiple Vitamin (MULTIVITAMIN) capsule, Take 1 capsule by mouth daily  , Disp: , Rfl:     omeprazole (PriLOSEC) 40 MG capsule, Take 1 capsule by mouth daily in the early morning , Disp: , Rfl:     rosuvastatin (CRESTOR) 10 MG tablet, Take 1 tablet (10 mg total) by mouth daily, Disp: 90 tablet, Rfl: 2    triamterene-hydrochlorothiazide (MAXZIDE-25) 37 5-25 mg per tablet, Take 1 tablet by mouth daily , Disp: , Rfl:     acetaminophen (TYLENOL) 325 mg tablet, Take 2 tablets (650 mg total) by mouth every 6 (six) hours (Patient not taking: Reported on 6/8/2022), Disp: 30 tablet, Rfl: 0    Biotin 1 MG CAPS, Take 1 capsule by mouth daily (Patient not taking: No sig reported), Disp: , Rfl:     ergocalciferol (VITAMIN D2) 50,000 units, TAKE 1 CAPSULE BY MOUTH EVERY WEEK TIMES 12 WEEKS, Disp: , Rfl:     nabumetone (RELAFEN) 750 mg tablet, Take 1 tablet (750 mg total) by mouth 2 (two) times a day (Patient not taking: No sig reported), Disp: 60 tablet, Rfl: 1    Omega-3 Fatty Acids (FISH OIL) 1200 MG CAPS, Take 1 capsule by mouth daily (Patient not taking: No sig reported), Disp: , Rfl:     Labs & Results:  Lab Results   Component Value Date    HSTNI0 5 01/30/2022    HSTNI2 5 01/30/2022     No results found for: NTBNP  Lab Results   Component Value Date    TRIG 180 (H) 06/02/2022    HDL 51 06/02/2022    LDLCALC 97 06/02/2022     Lab Results   Component Value Date     12/29/2015    K 3 1 (L) 01/30/2022    CO2 26 01/30/2022     01/30/2022    BUN 12 01/30/2022    CREATININE 0 92 01/30/2022    ALT 67 01/30/2022    AST 40 01/30/2022     Lab Results   Component Value Date    WBC 10 78 (H) 01/30/2022    HGB 15 2 01/30/2022    HCT 43 9 01/30/2022    MCV 88 01/30/2022     01/30/2022     Lab Results   Component Value Date    INR 0 99 04/29/2020    INR 0 90 10/26/2017    INR 0 93 12/29/2015     Assessment/Plan     Coronary artery calcification     Hypertension     Hyperlipidemia     Pre-DM     Class II Obesity    61M initially presented for likely musculoskeletal chest pain  ECG was normal but he did have coronary calcification on CT with elevated ASCVD risk of 14%  I initiated Rosuvastatin 10mg with  and on repeat his level improved to 97  Would continue current dose  BP well controlled this visit  Would continue amlodipine, HCTZ, and triamterene  He is otherwise stable and asymptomatic  Will re-evaluate in 6 months       Molina Osorio MD  Cardiology Fellow

## 2022-07-24 NOTE — LETTER
December 9, 2019     Patient: Becca Penn   YOB: 1960   Date of Visit: 12/9/2019       To Whom it May Concern:    Ian Alford is under my professional care  He had left distal biceps repair on 12/04/2019  He was seen in my office on 12/9/2019  He is to be out of work until further evaluation  If you have any questions or concerns, please don't hesitate to call           Sincerely,          Maikel Campos PA-C        CC: No Recipients no

## 2022-12-06 ENCOUNTER — TELEPHONE (OUTPATIENT)
Dept: CARDIOLOGY CLINIC | Facility: CLINIC | Age: 62
End: 2022-12-06

## 2022-12-06 NOTE — TELEPHONE ENCOUNTER
P/c'd , is questioning if he needs to be seen for a 6 mth OV if there is no labs or test to go over    If yes did you want labs?       Please advise

## 2023-02-03 ENCOUNTER — TELEPHONE (OUTPATIENT)
Dept: CARDIOLOGY CLINIC | Facility: CLINIC | Age: 63
End: 2023-02-03

## 2023-02-03 DIAGNOSIS — E78.2 MODERATE MIXED HYPERLIPIDEMIA NOT REQUIRING STATIN THERAPY: ICD-10-CM

## 2023-02-03 DIAGNOSIS — E78.2 MIXED HYPERLIPIDEMIA: Primary | ICD-10-CM

## 2023-02-03 DIAGNOSIS — I10 HYPERTENSION, UNSPECIFIED TYPE: ICD-10-CM

## 2023-02-03 DIAGNOSIS — R73.03 PREDIABETES: ICD-10-CM

## 2023-10-05 ENCOUNTER — OCCMED (OUTPATIENT)
Dept: URGENT CARE | Facility: CLINIC | Age: 63
End: 2023-10-05

## 2023-10-05 ENCOUNTER — APPOINTMENT (OUTPATIENT)
Dept: LAB | Facility: CLINIC | Age: 63
End: 2023-10-05

## 2023-10-05 DIAGNOSIS — Z02.1 PRE-EMPLOYMENT HEALTH SCREENING EXAMINATION: ICD-10-CM

## 2023-10-05 DIAGNOSIS — Z02.1 PRE-EMPLOYMENT HEALTH SCREENING EXAMINATION: Primary | ICD-10-CM

## 2023-10-05 LAB — RUBV IGG SERPL IA-ACNC: 30.1 IU/ML

## 2023-10-05 PROCEDURE — 86735 MUMPS ANTIBODY: CPT

## 2023-10-05 PROCEDURE — 36415 COLL VENOUS BLD VENIPUNCTURE: CPT

## 2023-10-05 PROCEDURE — 86480 TB TEST CELL IMMUN MEASURE: CPT

## 2023-10-05 PROCEDURE — 86787 VARICELLA-ZOSTER ANTIBODY: CPT

## 2023-10-05 PROCEDURE — 86765 RUBEOLA ANTIBODY: CPT

## 2023-10-05 PROCEDURE — 86762 RUBELLA ANTIBODY: CPT

## 2023-10-06 LAB
GAMMA INTERFERON BACKGROUND BLD IA-ACNC: 0.02 IU/ML
M TB IFN-G BLD-IMP: NEGATIVE
M TB IFN-G CD4+ BCKGRND COR BLD-ACNC: 0.02 IU/ML
M TB IFN-G CD4+ BCKGRND COR BLD-ACNC: 0.06 IU/ML
MEV IGG SER QL IA: NORMAL
MITOGEN IGNF BCKGRD COR BLD-ACNC: 9.98 IU/ML
MUV IGG SER QL IA: NORMAL
VZV IGG SER QL IA: NORMAL

## 2023-11-02 NOTE — PROGRESS NOTES
Cardiology Follow Up    Jeffery Sandhoff  1960  [id number]  [address] [place] [id number] [phone]    1. Chest pain, unspecified type  POCT ECG    NM myocardial perfusion spect (rx stress and/or rest)    Basic metabolic panel    Magnesium      2. Hypertension, unspecified type  Basic metabolic panel    Magnesium      3. Mixed hyperlipidemia  Lipid panel          Interval History:   Mr Tim Spears presents to our office with complaints of mid sternal chest pain occurring at rest.  Last weekend he was sitting and experienced mis sternal "like a tooth Ache" , 8/10, occurring for 10 minutes. He denies associated symptoms of nausea or shortness of breath. Patient Active Problem List   Diagnosis    HTN (hypertension)    GERD (gastroesophageal reflux disease)    Rupture of left distal biceps tendon    Aftercare following surgery of the musculoskeletal system    Status post right knee replacement    Greater trochanteric bursitis of right hip    Aftercare following right knee joint replacement surgery     Past Medical History:   Diagnosis Date    Anxiety     Arthritis     Chronic GERD     Hypertension     Peripheral neuropathy      Social History     Socioeconomic History    Marital status: /Civil Union     Spouse name: Not on file    Number of children: Not on file    Years of education: Not on file    Highest education level: Not on file   Occupational History    Not on file   Tobacco Use    Smoking status: Former     Types: Cigarettes     Quit date: 5/18/2020     Years since quitting: 3.4    Smokeless tobacco: Never    Tobacco comments:     social smoker   Vaping Use    Vaping Use: Never used   Substance and Sexual Activity    Alcohol use:  Yes     Alcohol/week: 10.0 standard drinks of alcohol     Types: 10 Cans of beer per week    Drug use: Yes     Types: Marijuana    Sexual activity: Not on file Other Topics Concern    Not on file   Social History Narrative    Not on file     Social Determinants of Health     Financial Resource Strain: Not on file   Food Insecurity: Not on file   Transportation Needs: Not on file   Physical Activity: Not on file   Stress: Not on file   Social Connections: Not on file   Intimate Partner Violence: Not on file   Housing Stability: Not on file      Family History   Problem Relation Age of Onset    Hypertension Mother      Past Surgical History:   Procedure Laterality Date    BACK SURGERY      lumbar 4-5    ELBOW SURGERY Right     Removal chips    JOINT REPLACEMENT Bilateral     Hip    LAMINECTOMY  2014    ME REINSERT BI/TRICEPS TENDON,DISTAL Left 12/04/2019    Procedure: DISTAL BICEPS TENDON REPAIR;  Surgeon: Tia Blackmon MD;  [facility];  Service: Orthopedics    ME TOTAL HIP ARTHROPLASTY Right 01/12/2016    Procedure: ARTHROPLASTY HIP TOTAL ANTERIOR;  Surgeon: Pino Butt DO;  [facility];  Service: Orthopedics    ME TOTAL KNEE ARTHROPLASTY Left 12/12/2016    Procedure: ARTHROPLASTY KNEE TOTAL;  Surgeon: Tuyet Lam MD;  [facility];  Service: Orthopedics    ME TOTAL KNEE ARTHROPLASTY Right 06/01/2020    Procedure: ARTHROPLASTY KNEE TOTAL;  Surgeon: Tuyet Lam MD;  [facility];  Service: Orthopedics       Current Outpatient Medications:     acetaminophen (TYLENOL) 325 mg tablet, Take 2 tablets (650 mg total) by mouth every 6 (six) hours (Patient not taking: Reported on 6/8/2022), Disp: 30 tablet, Rfl: 0    allopurinol (ZYLOPRIM) 100 mg tablet, TAKE 1 TABLET PO DAILY TIMES 1 WEEK, THEN 2 PO DAILY TIMES 1 WEEK THEN START 300MG, Disp: , Rfl:     ALPRAZolam (XANAX) 0.25 mg tablet, Take by mouth as needed , Disp: , Rfl:     amLODIPine (NORVASC) 10 mg tablet, Take 10 mg by mouth daily , Disp: , Rfl:     b complex vitamins capsule, Take 1 capsule by mouth daily. , Disp: , Rfl:     Biotin 1 MG CAPS, Take 1 capsule by mouth daily (Patient not taking: No sig reported), Disp: , Rfl:     calcium-vitamin D (OSCAL) 250-125 MG-UNIT per tablet, Take 1 tablet by mouth daily, Disp: , Rfl:     ergocalciferol (VITAMIN D2) 50,000 units, TAKE 1 CAPSULE BY MOUTH EVERY WEEK TIMES 12 WEEKS, Disp: , Rfl:     gabapentin (NEURONTIN) 100 mg capsule, , Disp: , Rfl:     Multiple Vitamin (MULTIVITAMIN) capsule, Take 1 capsule by mouth daily. , Disp: , Rfl:     nabumetone (RELAFEN) 750 mg tablet, Take 1 tablet (750 mg total) by mouth 2 (two) times a day (Patient not taking: No sig reported), Disp: 60 tablet, Rfl: 1    Omega-3 Fatty Acids (FISH OIL) 1200 MG CAPS, Take 1 capsule by mouth daily (Patient not taking: No sig reported), Disp: , Rfl:     omeprazole (PriLOSEC) 40 MG capsule, Take 1 capsule by mouth daily in the early morning , Disp: , Rfl:     rosuvastatin (CRESTOR) 10 MG tablet, TAKE 1 TABLET BY MOUTH EVERY DAY, Disp: 90 tablet, Rfl: 2    triamterene-hydrochlorothiazide (MAXZIDE-25) 37.5-25 mg per tablet, Take 1 tablet by mouth daily , Disp: , Rfl:   Allergies   Allergen Reactions    Oxycodone Itching       Labs:  Appointment on 10/05/2023   Component Date Value    Mumps IgG 10/05/2023 IMMUNE     Varicella IgG 10/05/2023 IMMUNE     Rubeola IgG 10/05/2023 IMMUNE     Rubella IgG Quant 10/05/2023 30.1     QFT Nil 10/05/2023 0.02     QFT TB1-NIL 10/05/2023 0.02     QFT TB2-NIL 10/05/2023 0.06     QFT Mitogen-NIL 10/05/2023 9.98     QFT Final Interpretation 10/05/2023 Negative      Imaging: No results found. Review of Systems:  Review of Systems    Physical Exam:  Physical Exam    Discussion/Summary:  # Chest pain, 8/10, lasting 10  minutes, denies associated symptoms of shortness of breath or nausea. ASCVD risk 13.7 %. EKG NSR, PVC's, non specific ST wave abnormality in lateral leads. Stu Marie has a hx of neela hip and neela knee replacement. He is unable to run on a treadmill.   I have ordered Rx Nuclear stress test R/O ischemia, BMP in near future   # Hypertension controlled on Amlodipine 10mg daily, MAXZIDE 37.5-25mg daily, DASH Diet   # Hyperlipidemia 6/22 , , HDL 51, LDL 97 continue on Crestor 10mg daily, fasting lipid panel in near future.   Heart healthy diet

## 2023-11-06 ENCOUNTER — OFFICE VISIT (OUTPATIENT)
Dept: CARDIOLOGY CLINIC | Facility: CLINIC | Age: 63
End: 2023-11-06

## 2023-11-06 VITALS
SYSTOLIC BLOOD PRESSURE: 140 MMHG | WEIGHT: 261.2 LBS | DIASTOLIC BLOOD PRESSURE: 80 MMHG | HEART RATE: 77 BPM | OXYGEN SATURATION: 96 % | BODY MASS INDEX: 35.38 KG/M2 | HEIGHT: 72 IN

## 2023-11-06 DIAGNOSIS — I10 HYPERTENSION, UNSPECIFIED TYPE: ICD-10-CM

## 2023-11-06 DIAGNOSIS — R07.9 CHEST PAIN, UNSPECIFIED TYPE: Primary | ICD-10-CM

## 2023-11-06 DIAGNOSIS — E78.2 MIXED HYPERLIPIDEMIA: ICD-10-CM

## 2023-11-06 PROCEDURE — 93000 ELECTROCARDIOGRAM COMPLETE: CPT | Performed by: NURSE PRACTITIONER

## 2023-11-06 PROCEDURE — 99215 OFFICE O/P EST HI 40 MIN: CPT | Performed by: NURSE PRACTITIONER

## 2023-11-06 RX ORDER — MELATONIN
1000 DAILY
COMMUNITY

## 2023-11-14 ENCOUNTER — OFFICE VISIT (OUTPATIENT)
Dept: URGENT CARE | Age: 63
End: 2023-11-14
Payer: COMMERCIAL

## 2023-11-14 VITALS
TEMPERATURE: 98 F | SYSTOLIC BLOOD PRESSURE: 159 MMHG | RESPIRATION RATE: 20 BRPM | OXYGEN SATURATION: 97 % | HEART RATE: 87 BPM | DIASTOLIC BLOOD PRESSURE: 91 MMHG

## 2023-11-14 DIAGNOSIS — J06.9 VIRAL URI: Primary | ICD-10-CM

## 2023-11-14 PROCEDURE — 99213 OFFICE O/P EST LOW 20 MIN: CPT | Performed by: STUDENT IN AN ORGANIZED HEALTH CARE EDUCATION/TRAINING PROGRAM

## 2023-11-14 RX ORDER — AMOXICILLIN AND CLAVULANATE POTASSIUM 875; 125 MG/1; MG/1
1 TABLET, FILM COATED ORAL EVERY 12 HOURS SCHEDULED
Qty: 14 TABLET | Refills: 0 | Status: SHIPPED | OUTPATIENT
Start: 2023-11-17 | End: 2023-11-24

## 2023-11-14 NOTE — PATIENT INSTRUCTIONS
To help with congestion, I recommend taking guaifenesin (Mucinex) 600 to 1200 mg every 12 hours. It is important to take it with plenty of water. To help clear out the mucus and reduce post-nasal drip - use saline nasal spray or saline nasal rinse (with distilled or boiled water). To soothe sore throat, you may try warm tea with honey. You may take Tylenol or Motrin to help with fever/chills or muscle aches. Stay well hydrated and get plenty of rest.     If your symptoms are worsening or fail to improve in the coming days days, please return to see us or schedule with your PCP.     Nasal Saline Rinse:

## 2023-11-14 NOTE — PROGRESS NOTES
[facility]        NAME: Jose Manning is a 61 y.o. male  : 1960    MRN: [id number]  DATE: 2023  TIME: 6:56 PM    Assessment and Plan   Viral URI [J06.9]  1. Viral URI  amoxicillin-clavulanate (AUGMENTIN) 875-125 mg per tablet      Discussed that his symptoms are currently consistent with viral URI/sinusitis. Declines COVID testing. Discussed timing of symptoms with concern for viral versus bacterial and risk of antibiotics versus no benefit in a viral infection. Will provide paper prescription for treatment for bacterial sinusitis that he may take after day 7 of symptoms if he is not improving, he understands that if he does begin to the improve in the coming days as I expect he will, he will not fill the antibiotic prescription. In the meantime, continue with Mucinex twice daily, good hydration, add saline nasal spray. Patient Instructions       Follow up with PCP in 3-5 days. Proceed to  ER if symptoms worsen. Chief Complaint     Chief Complaint   Patient presents with    Cough     Sinus pressure ,post nasal drip and cough since Saturday. History of Present Illness       Patient presents for 4 days of symptoms including bilateral maxillary sinus pressure, postnasal drip, occasional cough with chest congestion. When he takes Flonase, this is helpful. Also taking Mucinex and Advil which are helpful. No fevers no chills, no shortness of breath. When he blows his nose, not much comes out. He has had sinus infections in the past treated with Augmentin, he is concerned for a sinus infection currently and was hoping for an antibiotic prescription. Review of Systems   Review of Systems   All other systems reviewed and are negative.         Current Medications       Current Outpatient Medications:     [START ON 2023] amoxicillin-clavulanate (AUGMENTIN) 875-125 mg per tablet, Take 1 tablet by mouth every 12 (twelve) hours for 7 days Do not start before November 17, 2023., Disp: 14 tablet, Rfl: 0    acetaminophen (TYLENOL) 325 mg tablet, Take 2 tablets (650 mg total) by mouth every 6 (six) hours (Patient not taking: Reported on 6/8/2022), Disp: 30 tablet, Rfl: 0    allopurinol (ZYLOPRIM) 100 mg tablet, 100 mg daily, Disp: , Rfl:     ALPRAZolam (XANAX) 0.25 mg tablet, Take by mouth as needed , Disp: , Rfl:     amLODIPine (NORVASC) 10 mg tablet, Take 10 mg by mouth daily , Disp: , Rfl:     b complex vitamins capsule, Take 1 capsule by mouth daily. , Disp: , Rfl:     cholecalciferol (VITAMIN D3) 1,000 units tablet, Take 1,000 Units by mouth daily, Disp: , Rfl:     gabapentin (NEURONTIN) 100 mg capsule, 100 mg daily, Disp: , Rfl:     Multiple Vitamin (MULTIVITAMIN) capsule, Take 1 capsule by mouth daily. , Disp: , Rfl:     Omega-3 Fatty Acids (FISH OIL) 1200 MG CAPS, Take 1 capsule by mouth daily, Disp: , Rfl:     omeprazole (PriLOSEC) 40 MG capsule, Take 1 capsule by mouth daily in the early morning , Disp: , Rfl:     rosuvastatin (CRESTOR) 10 MG tablet, TAKE 1 TABLET BY MOUTH EVERY DAY, Disp: 90 tablet, Rfl: 2    triamterene-hydrochlorothiazide (MAXZIDE-25) 37.5-25 mg per tablet, Take 1 tablet by mouth daily , Disp: , Rfl:     Current Allergies     Allergies as of 11/14/2023 - Reviewed 11/14/2023   Allergen Reaction Noted    Oxycodone Itching 11/28/2016            The following portions of the patient's history were reviewed and updated as appropriate: allergies, current medications, past family history, past medical history, past social history, past surgical history and problem list.     Past Medical History:   Diagnosis Date    Anxiety     Arthritis     Chronic GERD     Hypertension     Peripheral neuropathy        Past Surgical History:   Procedure Laterality Date    BACK SURGERY      lumbar 4-5    ELBOW SURGERY Right     Removal chips    JOINT REPLACEMENT Bilateral     Hip    LAMINECTOMY  2014    CA ARTHRP ACETBLR/PROX FEM PROSTC AGRFT/ALGRFT Right 01/12/2016 Procedure: ARTHROPLASTY HIP TOTAL ANTERIOR;  Surgeon: Maikel Sorenson DO;  [facility];  Service: Orthopedics    TN ARTHRP KNE CONDYLE&PLATU MEDIAL&LAT COMPARTMENTS Left 12/12/2016    Procedure: ARTHROPLASTY KNEE TOTAL;  Surgeon: Michele Vargas MD;  [facility];  Service: Orthopedics    TN ARTHRP KNE CONDYLE&PLATU MEDIAL&LAT COMPARTMENTS Right 06/01/2020    Procedure: ARTHROPLASTY KNEE TOTAL;  Surgeon: Michele Vargas MD;  [facility];  Service: Orthopedics    TN RINSJ RPTD BICEPS/TRICEPS TDN DSTL W/WO TDN GRF Left 12/04/2019    Procedure: DISTAL BICEPS TENDON REPAIR;  Surgeon: Lida Chavez MD;  [facility];  Service: Orthopedics       Family History   Problem Relation Age of Onset    Hypertension Mother          Medications have been verified. Objective   /91   Pulse 87   Temp 98 °F (36.7 °C) (Temporal)   Resp 20   SpO2 97%   No LMP for male patient. Physical Exam     Physical Exam  Vitals and nursing note reviewed. Constitutional:       General: He is not in acute distress. Appearance: Normal appearance. He is not ill-appearing or toxic-appearing. HENT:      Head: Normocephalic and atraumatic. Right Ear: Tympanic membrane and external ear normal.      Left Ear: Tympanic membrane and external ear normal.      Nose: Congestion present. No rhinorrhea. Comments: Chronic septal perforation     Mouth/Throat:      Mouth: Mucous membranes are moist.      Pharynx: Posterior oropharyngeal erythema (mild) present. Eyes:      General:         Right eye: No discharge. Left eye: No discharge. Extraocular Movements: Extraocular movements intact. Cardiovascular:      Rate and Rhythm: Normal rate. Rhythm irregular. Heart sounds: Murmur heard. Comments: (Recently established with cardiology)  Pulmonary:      Effort: Pulmonary effort is normal. No respiratory distress. Breath sounds: Normal breath sounds. No stridor. No wheezing, rhonchi or rales. Musculoskeletal:      Cervical back: Neck supple. Lymphadenopathy:      Cervical: No cervical adenopathy. Skin:     General: Skin is warm and dry. Findings: No rash. Neurological:      Mental Status: He is alert.       Gait: Gait normal.   Psychiatric:         Behavior: Behavior normal.

## 2023-11-14 NOTE — PROGRESS NOTES
[facility]        NAME: Edison Carlos is a 61 y.o. male  : 1960    MRN: [id number]  DATE: 2023  TIME: 6:42 PM    Assessment and Plan   No primary diagnosis found. No diagnosis found. Patient Instructions       Follow up with PCP in 3-5 days. Proceed to  ER if symptoms worsen. Chief Complaint     Chief Complaint   Patient presents with    Cough     Sinus pressure ,post nasal drip and cough since Saturday. History of Present Illness       Scratchy throat, sore throat    Mucinex with advil, helps some    No fever, chills,    B/l maxillary sinuses    Chest congestion  Cough - brings up some mucus in the morning. Review of Systems   Review of Systems      Current Medications       Current Outpatient Medications:     acetaminophen (TYLENOL) 325 mg tablet, Take 2 tablets (650 mg total) by mouth every 6 (six) hours (Patient not taking: Reported on 2022), Disp: 30 tablet, Rfl: 0    allopurinol (ZYLOPRIM) 100 mg tablet, 100 mg daily, Disp: , Rfl:     ALPRAZolam (XANAX) 0.25 mg tablet, Take by mouth as needed , Disp: , Rfl:     amLODIPine (NORVASC) 10 mg tablet, Take 10 mg by mouth daily , Disp: , Rfl:     b complex vitamins capsule, Take 1 capsule by mouth daily. , Disp: , Rfl:     cholecalciferol (VITAMIN D3) 1,000 units tablet, Take 1,000 Units by mouth daily, Disp: , Rfl:     gabapentin (NEURONTIN) 100 mg capsule, 100 mg daily, Disp: , Rfl:     Multiple Vitamin (MULTIVITAMIN) capsule, Take 1 capsule by mouth daily. , Disp: , Rfl:     Omega-3 Fatty Acids (FISH OIL) 1200 MG CAPS, Take 1 capsule by mouth daily, Disp: , Rfl:     omeprazole (PriLOSEC) 40 MG capsule, Take 1 capsule by mouth daily in the early morning , Disp: , Rfl:     rosuvastatin (CRESTOR) 10 MG tablet, TAKE 1 TABLET BY MOUTH EVERY DAY, Disp: 90 tablet, Rfl: 2    triamterene-hydrochlorothiazide (MAXZIDE-25) 37.5-25 mg per tablet, Take 1 tablet by mouth daily , Disp: , Rfl:     Current Allergies Allergies as of 11/14/2023 - Reviewed 11/14/2023   Allergen Reaction Noted    Oxycodone Itching 11/28/2016            The following portions of the patient's history were reviewed and updated as appropriate: allergies, current medications, past family history, past medical history, past social history, past surgical history and problem list.     Past Medical History:   Diagnosis Date    Anxiety     Arthritis     Chronic GERD     Hypertension     Peripheral neuropathy        Past Surgical History:   Procedure Laterality Date    BACK SURGERY      lumbar 4-5    ELBOW SURGERY Right     Removal chips    JOINT REPLACEMENT Bilateral     Hip    LAMINECTOMY  2014    [place] ARTHRP ACETBLR/PROX FEM PROSTC AGRFT/ALGRFT Right 01/12/2016    Procedure: ARTHROPLASTY HIP TOTAL ANTERIOR;  Surgeon: Mignon Li DO;  [facility];  Service: Orthopedics    LA ARTHRP KNE CONDYLE&PLATU MEDIAL&LAT COMPARTMENTS Left 12/12/2016    Procedure: ARTHROPLASTY KNEE TOTAL;  Surgeon: Macho Gilliland MD;  [facility];  Service: Orthopedics    LA ARTHRP KNE CONDYLE&PLATU MEDIAL&LAT COMPARTMENTS Right 06/01/2020    Procedure: ARTHROPLASTY KNEE TOTAL;  Surgeon: Macho Gilliland MD;  [facility];  Service: Orthopedics    LA RINSJ RPTD BICEPS/TRICEPS TDN DSTL W/WO TDN GRF Left 12/04/2019    Procedure: DISTAL BICEPS TENDON REPAIR;  Surgeon: Marilou Liu MD;  [facility];  Service: Orthopedics       Family History   Problem Relation Age of Onset    Hypertension Mother          Medications have been verified. Objective   /91   Pulse 87   Temp 98 °F (36.7 °C) (Temporal)   Resp 20   SpO2 97%   No LMP for male patient.        Physical Exam     Physical Exam

## 2023-11-21 NOTE — PROGRESS NOTES
Name: Sherin Falk      : 1960      MRN: [id number]  Encounter Provider: Araceli Pantoja MD  Encounter Date: 2023   [facility]    Assessment & Plan     1. Need for hepatitis C screening test    2. Encounter for immunization    3. Hypertension, unspecified type  Assessment & Plan:  Blood pressure controlled. Continue amlodipine, Maxzide. Orders:  -     CBC and differential; Future    4. Pain in both feet  Assessment & Plan:  Tingling pain at the bottom of b/l feet for many years  Has been on low dose gabapentin for 10+ years. Now has mild symptoms, 2/10. Can trial of increase gabapentin to 200mg qhs, then if needed increase to 300mg qhs then f/u with us. If no improvement with increased dose will then stop gabapentin. 5. Gastroesophageal reflux disease, unspecified whether esophagitis present  Assessment & Plan: On omeprazole for years 40mg  Symptoms are under control  Will reduce it to 20mg after pt completes current Rx      Orders:  -     omeprazole (PriLOSEC) 20 mg delayed release capsule; Take 1 capsule (20 mg total) by mouth daily    6. Chronic idiopathic gout involving toe without tophus, unspecified laterality  Assessment & Plan:  Gout is under control. No flareups over the past year. Has been on allopurinol for years. Check uric acid yearly. Orders:  -     Uric acid; Future    7. Hyperlipidemia, unspecified hyperlipidemia type  -     CBC and differential; Future    8. Elevated glucose  -     Hemoglobin A1C; Future    9. Anxiety  Assessment & Plan: On xanax as needed. Takes about 5 days a week at night for sleep             Subjective      HPI  Establish care  Doing well in general.  No major complaint.   Review of Systems    Current Outpatient Medications on File Prior to Visit   Medication Sig    allopurinol (ZYLOPRIM) 100 mg tablet 100 mg daily    ALPRAZolam (XANAX) 0.25 mg tablet Take by mouth as needed     amLODIPine (NORVASC) 10 mg tablet Take 10 mg by mouth daily     amoxicillin-clavulanate (AUGMENTIN) 875-125 mg per tablet Take 1 tablet by mouth every 12 (twelve) hours for 7 days Do not start before November 17, 2023. b complex vitamins capsule Take 1 capsule by mouth daily. cholecalciferol (VITAMIN D3) 1,000 units tablet Take 1,000 Units by mouth daily    gabapentin (NEURONTIN) 100 mg capsule 100 mg daily    Multiple Vitamin (MULTIVITAMIN) capsule Take 1 capsule by mouth daily.     Omega-3 Fatty Acids (FISH OIL) 1200 MG CAPS Take 1 capsule by mouth daily    rosuvastatin (CRESTOR) 10 MG tablet TAKE 1 TABLET BY MOUTH EVERY DAY    triamterene-hydrochlorothiazide (MAXZIDE-25) 37.5-25 mg per tablet Take 1 tablet by mouth daily     [DISCONTINUED] omeprazole (PriLOSEC) 40 MG capsule Take 1 capsule by mouth daily in the early morning     acetaminophen (TYLENOL) 325 mg tablet Take 2 tablets (650 mg total) by mouth every 6 (six) hours (Patient not taking: Reported on 6/8/2022)       Objective     /78   Pulse 89   Resp 16   Ht 6' (1.829 m)   Wt 117 kg (258 lb 3.2 oz)   SpO2 96%   BMI 35.02 kg/m²     Physical Exam  Brenda Garcia MD

## 2023-11-22 ENCOUNTER — OFFICE VISIT (OUTPATIENT)
Dept: INTERNAL MEDICINE CLINIC | Facility: CLINIC | Age: 63
End: 2023-11-22
Payer: COMMERCIAL

## 2023-11-22 VITALS
WEIGHT: 258.2 LBS | SYSTOLIC BLOOD PRESSURE: 138 MMHG | BODY MASS INDEX: 34.97 KG/M2 | RESPIRATION RATE: 16 BRPM | OXYGEN SATURATION: 96 % | HEART RATE: 89 BPM | DIASTOLIC BLOOD PRESSURE: 78 MMHG | HEIGHT: 72 IN

## 2023-11-22 DIAGNOSIS — M79.671 PAIN IN BOTH FEET: ICD-10-CM

## 2023-11-22 DIAGNOSIS — F41.9 ANXIETY: ICD-10-CM

## 2023-11-22 DIAGNOSIS — E78.5 HYPERLIPIDEMIA, UNSPECIFIED HYPERLIPIDEMIA TYPE: ICD-10-CM

## 2023-11-22 DIAGNOSIS — Z11.59 NEED FOR HEPATITIS C SCREENING TEST: Primary | ICD-10-CM

## 2023-11-22 DIAGNOSIS — I10 HYPERTENSION, UNSPECIFIED TYPE: ICD-10-CM

## 2023-11-22 DIAGNOSIS — R73.09 ELEVATED GLUCOSE: ICD-10-CM

## 2023-11-22 DIAGNOSIS — Z23 ENCOUNTER FOR IMMUNIZATION: ICD-10-CM

## 2023-11-22 DIAGNOSIS — M1A.0790 CHRONIC IDIOPATHIC GOUT INVOLVING TOE WITHOUT TOPHUS, UNSPECIFIED LATERALITY: ICD-10-CM

## 2023-11-22 DIAGNOSIS — M79.672 PAIN IN BOTH FEET: ICD-10-CM

## 2023-11-22 DIAGNOSIS — K21.9 GASTROESOPHAGEAL REFLUX DISEASE, UNSPECIFIED WHETHER ESOPHAGITIS PRESENT: ICD-10-CM

## 2023-11-22 PROBLEM — Z47.1 AFTERCARE FOLLOWING RIGHT KNEE JOINT REPLACEMENT SURGERY: Status: RESOLVED | Noted: 2020-12-23 | Resolved: 2023-11-22

## 2023-11-22 PROBLEM — Z47.89 AFTERCARE FOLLOWING SURGERY OF THE MUSCULOSKELETAL SYSTEM: Status: RESOLVED | Noted: 2019-12-09 | Resolved: 2023-11-22

## 2023-11-22 PROBLEM — Z96.651 AFTERCARE FOLLOWING RIGHT KNEE JOINT REPLACEMENT SURGERY: Status: RESOLVED | Noted: 2020-12-23 | Resolved: 2023-11-22

## 2023-11-22 PROCEDURE — 99204 OFFICE O/P NEW MOD 45 MIN: CPT | Performed by: GENERAL ACUTE CARE HOSPITAL

## 2023-11-22 RX ORDER — OMEPRAZOLE 20 MG/1
20 CAPSULE, DELAYED RELEASE ORAL DAILY
Qty: 90 CAPSULE | Refills: 0 | Status: SHIPPED | OUTPATIENT
Start: 2023-11-22

## 2023-11-22 NOTE — ASSESSMENT & PLAN NOTE
Gout is under control. No flareups over the past year. Has been on allopurinol for years. Check uric acid yearly.

## 2023-11-22 NOTE — ASSESSMENT & PLAN NOTE
On omeprazole for years 40mg  Symptoms are under control  Will reduce it to 20mg after pt completes current Rx

## 2023-11-22 NOTE — PATIENT INSTRUCTIONS
Increase gabapentin to 200mg every night  After 5-7 days, if needed can increase to 300mg every night.

## 2023-11-22 NOTE — ASSESSMENT & PLAN NOTE
Tingling pain at the bottom of b/l feet for many years  Has been on low dose gabapentin for 10+ years. Now has mild symptoms, 2/10. Can trial of increase gabapentin to 200mg qhs, then if needed increase to 300mg qhs then f/u with us. If no improvement with increased dose will then stop gabapentin.

## 2023-12-13 ENCOUNTER — OFFICE VISIT (OUTPATIENT)
Dept: INTERNAL MEDICINE CLINIC | Facility: CLINIC | Age: 63
End: 2023-12-13
Payer: COMMERCIAL

## 2023-12-13 VITALS
HEART RATE: 90 BPM | WEIGHT: 261 LBS | BODY MASS INDEX: 35.35 KG/M2 | HEIGHT: 72 IN | DIASTOLIC BLOOD PRESSURE: 84 MMHG | OXYGEN SATURATION: 97 % | SYSTOLIC BLOOD PRESSURE: 132 MMHG

## 2023-12-13 DIAGNOSIS — J06.9 ACUTE URI: Primary | ICD-10-CM

## 2023-12-13 DIAGNOSIS — H61.21 IMPACTED CERUMEN OF RIGHT EAR: ICD-10-CM

## 2023-12-13 PROCEDURE — 69210 REMOVE IMPACTED EAR WAX UNI: CPT | Performed by: INTERNAL MEDICINE

## 2023-12-13 PROCEDURE — 87636 SARSCOV2 & INF A&B AMP PRB: CPT | Performed by: INTERNAL MEDICINE

## 2023-12-13 PROCEDURE — 99213 OFFICE O/P EST LOW 20 MIN: CPT | Performed by: INTERNAL MEDICINE

## 2023-12-13 NOTE — PROGRESS NOTES
Name: Suyapa Howe      : 1960      MRN: [id number]  Encounter Provider: Shruti Parker MD  Encounter Date: 2023   [facility]: [facility]     1. Acute URI  Assessment & Plan:  -Likely viral in etiology  -Take Mucinex/Robitussin DM for cough and congestion  -Administer a saline nasal spray as needed for sinus congestion  -Take Tylenol or ibuprofen as needed for pain and/or fever  -Perform daily salt water gargles for sore throat      Orders:  -     Covid/Flu- Office Collect Normal    2. Impacted cerumen of right ear  Assessment & Plan:  -Cerumen impaction cleared using an ear curette. Orders:  -     Ear cerumen removal         Subjective      HPI  Patient presents as an acute visit complaining of cough and cold symptoms. He reports his symptoms started last night and include mild sore throat, body aches, shakes and chills. He denies any fever or shortness of breath. He has been using a saline nasal spray and Advil as needed. Ear cerumen removal    Date/Time: 2023 5:20 PM    Performed by: Shruti Parker MD  Authorized by: Shruti Parker MD  Universal Protocol:  Consent: Verbal consent obtained. Consent given by: patient  Patient identity confirmed: verbally with patient    Patient location:  Clinic  Procedure details:     Location:  R ear    Procedure type: curette      Approach:  Natural orifice  Post-procedure details:     Complication:  None    Hearing quality:  Normal    Patient tolerance of procedure: Tolerated well, no immediate complications        All other systems negative except for pertinent findings noted in HPI.        Current Outpatient Medications on File Prior to Visit   Medication Sig   • allopurinol (ZYLOPRIM) 100 mg tablet 100 mg daily   • ALPRAZolam (XANAX) 0.25 mg tablet Take by mouth as needed    • amLODIPine (NORVASC) 10 mg tablet Take 10 mg by mouth daily    • b complex vitamins capsule Take 1 capsule by mouth daily. • cholecalciferol (VITAMIN D3) 1,000 units tablet Take 1,000 Units by mouth daily   • gabapentin (NEURONTIN) 100 mg capsule 100 mg daily   • Multiple Vitamin (MULTIVITAMIN) capsule Take 1 capsule by mouth daily.    • Omega-3 Fatty Acids (FISH OIL) 1200 MG CAPS Take 1 capsule by mouth daily   • omeprazole (PriLOSEC) 20 mg delayed release capsule Take 1 capsule (20 mg total) by mouth daily   • rosuvastatin (CRESTOR) 10 MG tablet TAKE 1 TABLET BY MOUTH EVERY DAY   • triamterene-hydrochlorothiazide (MAXZIDE-25) 37.5-25 mg per tablet Take 1 tablet by mouth daily    • acetaminophen (TYLENOL) 325 mg tablet Take 2 tablets (650 mg total) by mouth every 6 (six) hours (Patient not taking: Reported on 6/8/2022)       Objective     /84   Pulse 90   Ht 6' (1.829 m)   Wt 118 kg (261 lb)   SpO2 97%   BMI 35.40 kg/m²     BP Readings from Last 3 Encounters:   12/13/23 132/84   11/22/23 138/78   11/14/23 159/91        Wt Readings from Last 3 Encounters:   12/13/23 118 kg (261 lb)   11/22/23 117 kg (258 lb 3.2 oz)   11/06/23 118 kg (261 lb 3.2 oz)       Physical Exam    General: NAD, Alert and oriented x3   HEENT: NCAT, EOMI, normal conjunctiva  Cardiovascular: RRR, normal S1 and S2, no m/r/g  Pulmonary: Normal respiratory effort, no wheezes, rales or rhonchi  GI: Soft, nontender, nondistended, normoactive bowel sounds  MSK: Normal bulk and tone  Neuro: Non-focal, ambulating without difficulty, non-antalgic gait  Extremities: No lower extremity edema  Skin: Normal skin color, no rashes     Papo Santos MD

## 2023-12-13 NOTE — ASSESSMENT & PLAN NOTE
-Likely viral in etiology  -Take Mucinex/Robitussin DM for cough and congestion  -Administer a saline nasal spray as needed for sinus congestion  -Take Tylenol or ibuprofen as needed for pain and/or fever  -Perform daily salt water gargles for sore throat

## 2023-12-13 NOTE — PATIENT INSTRUCTIONS
-Take Mucinex/Robitussin DM for cough and congestion  -Administer a saline nasal spray as needed for sinus congestion  -Take Tylenol or ibuprofen as needed for pain and/or fever  -Perform daily salt water gargles for sore throat

## 2023-12-14 DIAGNOSIS — U07.1 COVID-19: ICD-10-CM

## 2023-12-14 DIAGNOSIS — U07.1 COVID-19: Primary | ICD-10-CM

## 2023-12-14 LAB
FLUAV RNA RESP QL NAA+PROBE: NEGATIVE
FLUBV RNA RESP QL NAA+PROBE: NEGATIVE
SARS-COV-2 RNA RESP QL NAA+PROBE: POSITIVE

## 2023-12-14 RX ORDER — NIRMATRELVIR AND RITONAVIR 300-100 MG
3 KIT ORAL 2 TIMES DAILY
Qty: 30 TABLET | Refills: 0 | Status: SHIPPED | OUTPATIENT
Start: 2023-12-14 | End: 2023-12-14 | Stop reason: SDUPTHER

## 2023-12-14 NOTE — TELEPHONE ENCOUNTER
Pt returned our call. Providers result note given. Pt would like the Paxlovid sent to [facility] instead.

## 2023-12-15 RX ORDER — NIRMATRELVIR AND RITONAVIR 300-100 MG
3 KIT ORAL 2 TIMES DAILY
Qty: 30 TABLET | Refills: 0 | Status: SHIPPED | OUTPATIENT
Start: 2023-12-15 | End: 2023-12-20

## 2024-01-04 ENCOUNTER — LAB (OUTPATIENT)
Dept: LAB | Facility: CLINIC | Age: 64
End: 2024-01-04
Payer: COMMERCIAL

## 2024-01-04 DIAGNOSIS — E78.5 HYPERLIPIDEMIA, UNSPECIFIED HYPERLIPIDEMIA TYPE: ICD-10-CM

## 2024-01-04 DIAGNOSIS — I10 HYPERTENSION, UNSPECIFIED TYPE: ICD-10-CM

## 2024-01-04 DIAGNOSIS — R73.09 ELEVATED GLUCOSE: ICD-10-CM

## 2024-01-04 DIAGNOSIS — M1A.0790 CHRONIC IDIOPATHIC GOUT INVOLVING TOE WITHOUT TOPHUS, UNSPECIFIED LATERALITY: ICD-10-CM

## 2024-01-04 LAB
ANION GAP SERPL CALCULATED.3IONS-SCNC: 10 MMOL/L
BASOPHILS # BLD AUTO: 0.08 THOUSANDS/ÂΜL (ref 0–0.1)
BASOPHILS NFR BLD AUTO: 1 % (ref 0–1)
BUN SERPL-MCNC: 14 MG/DL (ref 5–25)
CALCIUM SERPL-MCNC: 9.1 MG/DL (ref 8.4–10.2)
CHLORIDE SERPL-SCNC: 102 MMOL/L (ref 96–108)
CHOLEST SERPL-MCNC: 147 MG/DL
CO2 SERPL-SCNC: 25 MMOL/L (ref 21–32)
CREAT SERPL-MCNC: 0.74 MG/DL (ref 0.6–1.3)
EOSINOPHIL # BLD AUTO: 0.23 THOUSAND/ÂΜL (ref 0–0.61)
EOSINOPHIL NFR BLD AUTO: 3 % (ref 0–6)
ERYTHROCYTE [DISTWIDTH] IN BLOOD BY AUTOMATED COUNT: 13.5 % (ref 11.6–15.1)
EST. AVERAGE GLUCOSE BLD GHB EST-MCNC: 140 MG/DL
GFR SERPL CREATININE-BSD FRML MDRD: 98 ML/MIN/1.73SQ M
GLUCOSE P FAST SERPL-MCNC: 91 MG/DL (ref 65–99)
HBA1C MFR BLD: 6.5 %
HCT VFR BLD AUTO: 40.5 % (ref 36.5–49.3)
HDLC SERPL-MCNC: 42 MG/DL
HGB BLD-MCNC: 13.4 G/DL (ref 12–17)
IMM GRANULOCYTES # BLD AUTO: 0.03 THOUSAND/UL (ref 0–0.2)
IMM GRANULOCYTES NFR BLD AUTO: 0 % (ref 0–2)
LDLC SERPL CALC-MCNC: 73 MG/DL (ref 0–100)
LYMPHOCYTES # BLD AUTO: 2.31 THOUSANDS/ÂΜL (ref 0.6–4.47)
LYMPHOCYTES NFR BLD AUTO: 30 % (ref 14–44)
MAGNESIUM SERPL-MCNC: 1.9 MG/DL (ref 1.9–2.7)
MCH RBC QN AUTO: 27.1 PG (ref 26.8–34.3)
MCHC RBC AUTO-ENTMCNC: 33.1 G/DL (ref 31.4–37.4)
MCV RBC AUTO: 82 FL (ref 82–98)
MONOCYTES # BLD AUTO: 0.81 THOUSAND/ÂΜL (ref 0.17–1.22)
MONOCYTES NFR BLD AUTO: 10 % (ref 4–12)
NEUTROPHILS # BLD AUTO: 4.3 THOUSANDS/ÂΜL (ref 1.85–7.62)
NEUTS SEG NFR BLD AUTO: 56 % (ref 43–75)
NONHDLC SERPL-MCNC: 105 MG/DL
NRBC BLD AUTO-RTO: 0 /100 WBCS
PLATELET # BLD AUTO: 304 THOUSANDS/UL (ref 149–390)
PMV BLD AUTO: 10.5 FL (ref 8.9–12.7)
POTASSIUM SERPL-SCNC: 3.4 MMOL/L (ref 3.5–5.3)
RBC # BLD AUTO: 4.95 MILLION/UL (ref 3.88–5.62)
SODIUM SERPL-SCNC: 137 MMOL/L (ref 135–147)
TRIGL SERPL-MCNC: 158 MG/DL
URATE SERPL-MCNC: 6.4 MG/DL (ref 3.5–8.5)
WBC # BLD AUTO: 7.76 THOUSAND/UL (ref 4.31–10.16)

## 2024-01-04 PROCEDURE — 83036 HEMOGLOBIN GLYCOSYLATED A1C: CPT

## 2024-01-04 PROCEDURE — 84550 ASSAY OF BLOOD/URIC ACID: CPT

## 2024-01-04 PROCEDURE — 85025 COMPLETE CBC W/AUTO DIFF WBC: CPT

## 2024-01-05 NOTE — RESULT ENCOUNTER NOTE
Looks like patient has appointment with me coming up, I do not see that I have ever seen him, will review in more detail at upcoming appt.

## 2024-01-08 ENCOUNTER — TELEPHONE (OUTPATIENT)
Dept: CARDIOLOGY CLINIC | Facility: CLINIC | Age: 64
End: 2024-01-08

## 2024-01-08 RX ORDER — CLOTRIMAZOLE AND BETAMETHASONE DIPROPIONATE 10; .64 MG/G; MG/G
CREAM TOPICAL
COMMUNITY
Start: 2023-11-24

## 2024-01-08 NOTE — TELEPHONE ENCOUNTER
----- Message from YARELY Duval sent at 1/8/2024  9:59 AM EST -----  Pleas call Sourav and inform him TC improved to 147, TG from 180 to 158 LD from 97 to 73, continue with medication diet and exercise     Called pt and lft msg re: improved TC (lipid panel).

## 2024-01-09 ENCOUNTER — OFFICE VISIT (OUTPATIENT)
Dept: INTERNAL MEDICINE CLINIC | Facility: CLINIC | Age: 64
End: 2024-01-09
Payer: COMMERCIAL

## 2024-01-09 ENCOUNTER — TELEPHONE (OUTPATIENT)
Age: 64
End: 2024-01-09

## 2024-01-09 VITALS
DIASTOLIC BLOOD PRESSURE: 84 MMHG | HEART RATE: 87 BPM | SYSTOLIC BLOOD PRESSURE: 142 MMHG | WEIGHT: 260.6 LBS | OXYGEN SATURATION: 98 % | BODY MASS INDEX: 35.34 KG/M2

## 2024-01-09 DIAGNOSIS — Z12.11 SCREENING FOR COLON CANCER: ICD-10-CM

## 2024-01-09 DIAGNOSIS — Z00.00 WELLNESS EXAMINATION: ICD-10-CM

## 2024-01-09 DIAGNOSIS — Z23 ENCOUNTER FOR IMMUNIZATION: ICD-10-CM

## 2024-01-09 DIAGNOSIS — Z12.5 SCREENING FOR PROSTATE CANCER: ICD-10-CM

## 2024-01-09 DIAGNOSIS — I10 PRIMARY HYPERTENSION: ICD-10-CM

## 2024-01-09 DIAGNOSIS — K21.9 GASTROESOPHAGEAL REFLUX DISEASE, UNSPECIFIED WHETHER ESOPHAGITIS PRESENT: ICD-10-CM

## 2024-01-09 DIAGNOSIS — Z11.4 SCREENING FOR HIV (HUMAN IMMUNODEFICIENCY VIRUS): ICD-10-CM

## 2024-01-09 DIAGNOSIS — E11.9 TYPE 2 DIABETES MELLITUS WITHOUT COMPLICATION, WITHOUT LONG-TERM CURRENT USE OF INSULIN (HCC): Primary | ICD-10-CM

## 2024-01-09 DIAGNOSIS — M1A.0790 CHRONIC IDIOPATHIC GOUT INVOLVING TOE WITHOUT TOPHUS, UNSPECIFIED LATERALITY: ICD-10-CM

## 2024-01-09 DIAGNOSIS — Z11.59 NEED FOR HEPATITIS C SCREENING TEST: ICD-10-CM

## 2024-01-09 PROBLEM — J06.9 ACUTE URI: Status: RESOLVED | Noted: 2023-12-13 | Resolved: 2024-01-09

## 2024-01-09 PROCEDURE — 99214 OFFICE O/P EST MOD 30 MIN: CPT | Performed by: INTERNAL MEDICINE

## 2024-01-09 PROCEDURE — 82570 ASSAY OF URINE CREATININE: CPT | Performed by: INTERNAL MEDICINE

## 2024-01-09 PROCEDURE — 82043 UR ALBUMIN QUANTITATIVE: CPT | Performed by: INTERNAL MEDICINE

## 2024-01-09 PROCEDURE — 99396 PREV VISIT EST AGE 40-64: CPT | Performed by: INTERNAL MEDICINE

## 2024-01-09 NOTE — PROGRESS NOTES
Name: Gary G Moritz      : 1960      MRN: [id number]  Encounter Provider: Juan Carlos Arrington MD  Encounter Date: 2024   Enc[facility] department: [facility]    Assessment & Plan     1. Type 2 diabetes mellitus without complication, without long-term current use of insulin (HCC)  Assessment & Plan:  A1c 6.5, continue with healthy diet and exercise, will check urine microalbumin to creatinine ratio and iris exam  Lab Results   Component Value Date    HGBA1C 6.5 (H) 2024     Orders:  -     IRIS Diabetic eye exam  -     Albumin / creatinine urine ratio    2. Need for hepatitis C screening test  -     Hepatitis C Antibody; Future    3. Encounter for immunization    4. Wellness examination  Assessment & Plan:  Discussed preventative health, cancer screening, immunizations, and safety issues.  I recommend screening colonoscopy.  I recommend yearly flu shot, patient already had.  I recommend Prevnar 20.  I recommend RSV vaccine at the pharmacy I recommend Tdap vaccination at the pharmacy.    I recommend getting the Shingrix shot to help prevent Shingles.  You can get it a pharmacy, and they can administer it there.  It is a two shot series with the second shot needed between 2-6 months after the first shot.  I would not recommend getting the shot before an important or fun event in case you were to have a reaction to the shot like a sore arm or flu-like symptoms.  I make the same recommendation about any shot, as people can have a reaction to any shot.      5. Primary hypertension  Assessment & Plan:  Borderline, continue meds along with healthy diet and exercise      6. Gastroesophageal reflux disease, unspecified whether esophagitis present  Assessment & Plan:  Continue omeprazole at the lowest effective dose, can try titrating down as tolerated, no problems with food getting stuck      7. Chronic idiopathic gout involving toe without tophus, unspecified laterality  Assessment &  Plan:  Recent uric acid good, continue allopurinol, no recent episodes of gout      8. Screening for HIV (human immunodeficiency virus)  -     HIV 1/2 AG/AB w Reflex [facility] for 2 yr old and above; Future    9. Screening for colon cancer  -     Ambulatory referral to Colorectal Surgery; Future    10. Screening for prostate cancer  -     PSA, Total Screen; Future           Subjective     Pt here for follow up, first time seeing me.      Wellness: Patient gets routine dental care, no smoking cigarettes, eats a healthy diet and exercises.  Patient is been trying to lose some weight      Review of Systems   Constitutional:  Negative for chills, fatigue and fever.   HENT:  Negative for congestion, nosebleeds, postnasal drip, sore throat and trouble swallowing.    Eyes:  Negative for pain.   Respiratory:  Negative for cough, chest tightness, shortness of breath and wheezing.    Cardiovascular:  Negative for chest pain, palpitations and leg swelling.   Gastrointestinal:  Negative for abdominal pain, constipation, diarrhea, nausea and vomiting.   Endocrine: Negative for polydipsia and polyuria.   Genitourinary:  Negative for dysuria, flank pain and hematuria.   Musculoskeletal:  Negative for arthralgias, back pain and myalgias.   Skin:  Negative for rash.   Neurological:  Negative for dizziness, tremors, light-headedness and headaches.   Hematological:  Does not bruise/bleed easily.   Psychiatric/Behavioral:  Negative for confusion and dysphoric mood. The patient is not nervous/anxious.        Past Medical History:   Diagnosis Date   • Acute URI    • Anxiety    • Arthritis    • Chronic GERD    • Hypertension    • Peripheral neuropathy    • Type 2 diabetes mellitus without complication, without long-term current use of insulin ([facility]) 01/09/2024     Past Surgical History:   Procedure Laterality Date   • BACK SURGERY      lumbar 4-5   • ELBOW SURGERY Right     Removal chips   • JOINT REPLACEMENT Bilateral     Hip   • LAMINECTOMY   2014   • [place] ARTHRP ACETBLR/PROX FEM PROSTC AGRFT/ALGRFT Right 01/12/2016    Procedure: ARTHROPLASTY HIP TOTAL ANTERIOR;  Surgeon: Malik Meyers DO;  [facility];  Service: Orthopedics   • [place] ARTHRP KNE CONDYLE&PLATU MEDIAL&LAT COMPARTMENTS Left 12/12/2016    Procedure: ARTHROPLASTY KNEE TOTAL;  Surgeon: Antoine Pena MD;  Location[facility];  Service: Orthopedics   • [place] ARTHRP KNE CONDYLE&PLATU MEDIAL&LAT COMPARTMENTS Right 06/01/2020    Procedure: ARTHROPLASTY KNEE TOTAL;  Surgeon: Antoine Pena MD;  [facility];  Service: Orthopedics   • [place] RINSJ RPTD BICEPS/TRICEPS TDN DSTL W/WO TDN GRF Left 12/04/2019    Procedure: DISTAL BICEPS TENDON REPAIR;  Surgeon: Chauncey Perea MD;  Location[facility];  Service: Orthopedics     Family History   Problem Relation Age of Onset   • Hypertension Mother      Social History     Socioeconomic History   • Marital status: /Civil Union     Spouse name: None   • Number of children: None   • Years of education: None   • Highest education level: None   Occupational History   • None   Tobacco Use   • Smoking status: Former     Current packs/day: 0.00     Types: Cigarettes     Quit date: 5/18/2020     Years since quitting: 3.6   • Smokeless tobacco: Never   • Tobacco comments:     social smoker   Vaping Use   • Vaping status: Never Used   Substance and Sexual Activity   • Alcohol use: Yes     Alcohol/week: 3.0 standard drinks of alcohol     Types: 3 Cans of beer per week   • Drug use: Not Currently     Types: Marijuana   • Sexual activity: None   Other Topics Concern   • None   Social History Narrative   • None     Social Determinants of Health     Financial Resource Strain: Not on file   Food Insecurity: Not on file   Transportation Needs: Not on file   Physical Activity: Not on file   Stress: Not on file   Social Connections: Not on file   Intimate Partner Violence: Not on file   Housing Stability: Not on file     Current Outpatient Medications  on File Prior to Visit   Medication Sig   • allopurinol (ZYLOPRIM) 100 mg tablet 100 mg daily   • ALPRAZolam (XANAX) 0.25 mg tablet Take by mouth as needed    • amLODIPine (NORVASC) 10 mg tablet Take 10 mg by mouth daily    • b complex vitamins capsule Take 1 capsule by mouth daily.   • cholecalciferol (VITAMIN D3) 1,000 units tablet Take 1,000 Units by mouth daily   • clotrimazole-betamethasone (LOTRISONE) 1-0.05 % cream apply to affected area twice a day for 2 weeks AND SURROUNDING AR...  (REFER TO PRESCRIPTION NOTES).   • gabapentin (NEURONTIN) 100 mg capsule 100 mg daily   • Multiple Vitamin (MULTIVITAMIN) capsule Take 1 capsule by mouth daily.   • Omega-3 Fatty Acids (FISH OIL) 1200 MG CAPS Take 1 capsule by mouth daily   • omeprazole (PriLOSEC) 20 mg delayed release capsule Take 1 capsule (20 mg total) by mouth daily   • rosuvastatin (CRESTOR) 10 MG tablet TAKE 1 TABLET BY MOUTH EVERY DAY   • triamterene-hydrochlorothiazide (MAXZIDE-25) 37.5-25 mg per tablet Take 1 tablet by mouth daily    • acetaminophen (TYLENOL) 325 mg tablet Take 2 tablets (650 mg total) by mouth every 6 (six) hours (Patient not taking: Reported on 6/8/2022)     Allergies   Allergen Reactions   • Oxycodone Itching     Immunization History   Administered Date(s) Administered   • COVID-19 PFIZER VACCINE 0.3 ML IM 04/11/2021, 05/07/2021       Objective     /84 (BP Location: Left arm, Patient Position: Sitting, Cuff Size: Standard)   Pulse 87   Wt 118 kg (260 lb 9.6 oz)   SpO2 98%   BMI 35.34 kg/m²   Patient's shoes and socks removed.    Right Foot/Ankle   Right Foot Inspection  Skin Exam: skin normal and skin intact. No dry skin, no warmth, no callus, no erythema, no maceration, no abnormal color, no pre-ulcer, no ulcer and no callus.     Toe Exam: ROM and strength within normal limits and right toe deformity. No swelling, no tenderness and erythema    Sensory   Monofilament testing: intact    Vascular  The right DP pulse is 2+.      Left Foot/Ankle  Left Foot Inspection  Skin Exam: skin normal and skin intact. No dry skin, no warmth, no erythema, no maceration, normal color, no pre-ulcer, no ulcer and no callus.     Toe Exam: ROM and strength within normal limits and left toe deformity. No swelling, no tenderness and no erythema.     Sensory   Monofilament testing: intact    Vascular  The left DP pulse is 2+.     Assign Risk Category  No deformity present  No loss of protective sensation  No weak pulses  Risk: 0     Physical Exam  Vitals reviewed.   Constitutional:       General: He is not in acute distress.     Appearance: Normal appearance. He is well-developed.   HENT:      Head: Normocephalic and atraumatic.      Right Ear: External ear normal.      Left Ear: External ear normal.   Eyes:      General: No scleral icterus.     Conjunctiva/sclera: Conjunctivae normal.   Neck:      Thyroid: No thyromegaly.      Trachea: No tracheal deviation.   Cardiovascular:      Rate and Rhythm: Normal rate and regular rhythm.      Pulses: no weak pulses          Dorsalis pedis pulses are 2+ on the right side and 2+ on the left side.      Heart sounds: Normal heart sounds. No murmur heard.  Pulmonary:      Effort: Pulmonary effort is normal. No respiratory distress.      Breath sounds: Normal breath sounds. No wheezing or rales.   Abdominal:      General: Bowel sounds are normal.      Palpations: Abdomen is soft.      Tenderness: There is no abdominal tenderness. There is no guarding or rebound.      Hernia: There is no hernia in the left inguinal area or right inguinal area.   Genitourinary:     Testes: Normal.   Musculoskeletal:      Cervical back: Normal range of motion and neck supple.      Right lower leg: No edema.      Left lower leg: No edema.   Feet:      Right foot:      Skin integrity: No ulcer, skin breakdown, erythema, warmth, callus or dry skin.      Left foot:      Skin integrity: No ulcer, skin breakdown, erythema, warmth, callus or dry  skin.   Lymphadenopathy:      Cervical: No cervical adenopathy.   Skin:     Coloration: Skin is not jaundiced or pale.   Neurological:      General: No focal deficit present.      Mental Status: He is alert and oriented to person, place, and time.   Psychiatric:         Mood and Affect: Mood normal.         Behavior: Behavior normal.         Thought Content: Thought content normal.         Judgment: Judgment normal.       Juan Carlos Arrington MD

## 2024-01-09 NOTE — ASSESSMENT & PLAN NOTE
Continue omeprazole at the lowest effective dose, can try titrating down as tolerated, no problems with food getting stuck

## 2024-01-09 NOTE — PATIENT INSTRUCTIONS
Problem List Items Addressed This Visit          Digestive    GERD (gastroesophageal reflux disease)     Continue omeprazole at the lowest effective dose, can try titrating down as tolerated, no problems with food getting stuck            Endocrine    Type 2 diabetes mellitus without complication, without long-term current use of insulin (HCC) - Primary     A1c 6.5, continue with healthy diet and exercise, will check urine microalbumin to creatinine ratio and iris exam  Lab Results   Component Value Date    HGBA1C 6.5 (H) 01/04/2024            Relevant Orders    IRIS Diabetic eye exam    Albumin / creatinine urine ratio       Cardiovascular and Mediastinum    Primary hypertension     Borderline, continue meds along with healthy diet and exercise            Musculoskeletal and Integument    Chronic idiopathic gout involving toe     Recent uric acid good, continue allopurinol, no recent episodes of gout            Other    Wellness examination     Discussed preventative health, cancer screening, immunizations, and safety issues.  I recommend screening colonoscopy.  I recommend yearly flu shot, patient already had.  I recommend Prevnar 20.  I recommend RSV vaccine at the pharmacy I recommend Tdap vaccination at the pharmacy.    I recommend getting the Shingrix shot to help prevent Shingles.  You can get it a pharmacy, and they can administer it there.  It is a two shot series with the second shot needed between 2-6 months after the first shot.  I would not recommend getting the shot before an important or fun event in case you were to have a reaction to the shot like a sore arm or flu-like symptoms.  I make the same recommendation about any shot, as people can have a reaction to any shot.          Other Visit Diagnoses       Need for hepatitis C screening test        Relevant Orders    Hepatitis C Antibody    Encounter for immunization        Screening for HIV (human immunodeficiency virus)        Relevant Orders     HIV 1/2 AG/AB w Reflex [facility] for 2 yr old and above    Screening for colon cancer        Relevant Orders    Ambulatory referral to Colorectal Surgery    Screening for prostate cancer        Relevant Orders    PSA, Total Screen

## 2024-01-09 NOTE — ASSESSMENT & PLAN NOTE
Discussed preventative health, cancer screening, immunizations, and safety issues.  I recommend screening colonoscopy.  I recommend yearly flu shot, patient already had.  I recommend Prevnar 20.  I recommend RSV vaccine at the pharmacy I recommend Tdap vaccination at the pharmacy.    I recommend getting the Shingrix shot to help prevent Shingles.  You can get it a pharmacy, and they can administer it there.  It is a two shot series with the second shot needed between 2-6 months after the first shot.  I would not recommend getting the shot before an important or fun event in case you were to have a reaction to the shot like a sore arm or flu-like symptoms.  I make the same recommendation about any shot, as people can have a reaction to any shot.

## 2024-01-09 NOTE — ASSESSMENT & PLAN NOTE
A1c 6.5, continue with healthy diet and exercise, will check urine microalbumin to creatinine ratio and iris exam  Lab Results   Component Value Date    HGBA1C 6.5 (H) 01/04/2024

## 2024-01-09 NOTE — TELEPHONE ENCOUNTER
Caller: Patient    Doctor: Jean    Reason for call: Had knee sx 2020. Has dental appt 1/10. Questioned if he should take an antibiotic? Please call patient to advise    Call back#: [phone]

## 2024-01-10 ENCOUNTER — TELEPHONE (OUTPATIENT)
Age: 64
End: 2024-01-10

## 2024-01-10 LAB
CREAT UR-MCNC: 130 MG/DL
MICROALBUMIN UR-MCNC: 8.5 MG/L
MICROALBUMIN/CREAT 24H UR: 7 MG/G CREATININE (ref 0–30)

## 2024-01-10 NOTE — TELEPHONE ENCOUNTER
Patient called in for urine results post OV 1/9/24. RN reviewed provider's comments urine albumin/creatinen. If provider has any additonal concerns for HgA1c of 6.5 in 1/4 results, please follow up with patient.

## 2024-01-10 NOTE — TELEPHONE ENCOUNTER
Patient returning call for lab results. Patient was warm transferred to CTS for further assistance.

## 2024-01-24 ENCOUNTER — TELEPHONE (OUTPATIENT)
Age: 64
End: 2024-01-24

## 2024-01-24 NOTE — TELEPHONE ENCOUNTER
Patient passed OA but stated he will call us back because he has to look at available dates from work.

## 2024-02-01 ENCOUNTER — TELEPHONE (OUTPATIENT)
Age: 64
End: 2024-02-01

## 2024-02-01 NOTE — TELEPHONE ENCOUNTER
Scheduled date of colonoscopy (as of today):02.23.24    Physician performing colonoscopy:Krista    Location of colonoscopy:[facility]    Bowel prep reviewed with patient:Davi/Frida    Instructions reviewed with patient by:Lsims and sent thru chart

## 2024-02-09 ENCOUNTER — TELEPHONE (OUTPATIENT)
Age: 64
End: 2024-02-09

## 2024-02-09 NOTE — TELEPHONE ENCOUNTER
Spoke w/PT to confirm 2/23/24 colonoscopy, PT has prep instructions. PT aware he will receive call with arrival time the day before procedure.

## 2024-02-11 PROBLEM — H61.21 IMPACTED CERUMEN OF RIGHT EAR: Status: RESOLVED | Noted: 2023-12-13 | Resolved: 2024-02-11

## 2024-02-23 ENCOUNTER — ANESTHESIA EVENT (OUTPATIENT)
Dept: GASTROENTEROLOGY | Facility: HOSPITAL | Age: 64
End: 2024-02-23

## 2024-02-23 ENCOUNTER — HOSPITAL ENCOUNTER (OUTPATIENT)
Dept: GASTROENTEROLOGY | Facility: HOSPITAL | Age: 64
Setting detail: OUTPATIENT SURGERY
End: 2024-02-23
Attending: COLON & RECTAL SURGERY
Payer: COMMERCIAL

## 2024-02-23 ENCOUNTER — ANESTHESIA (OUTPATIENT)
Dept: GASTROENTEROLOGY | Facility: HOSPITAL | Age: 64
End: 2024-02-23

## 2024-02-23 VITALS
DIASTOLIC BLOOD PRESSURE: 74 MMHG | TEMPERATURE: 97.5 F | RESPIRATION RATE: 16 BRPM | SYSTOLIC BLOOD PRESSURE: 108 MMHG | OXYGEN SATURATION: 98 % | HEART RATE: 68 BPM

## 2024-02-23 DIAGNOSIS — Z12.11 SCREENING FOR COLON CANCER: ICD-10-CM

## 2024-02-23 PROBLEM — E66.9 OBESITY (BMI 30-39.9): Status: ACTIVE | Noted: 2024-02-23

## 2024-02-23 LAB — GLUCOSE SERPL-MCNC: 103 MG/DL (ref 65–140)

## 2024-02-23 PROCEDURE — 88305 TISSUE EXAM BY PATHOLOGIST: CPT | Performed by: PATHOLOGY

## 2024-02-23 PROCEDURE — 45385 COLONOSCOPY W/LESION REMOVAL: CPT | Performed by: COLON & RECTAL SURGERY

## 2024-02-23 PROCEDURE — 82948 REAGENT STRIP/BLOOD GLUCOSE: CPT

## 2024-02-23 RX ORDER — PROPOFOL 10 MG/ML
INJECTION, EMULSION INTRAVENOUS AS NEEDED
Status: DISCONTINUED | OUTPATIENT
Start: 2024-02-23 | End: 2024-02-23

## 2024-02-23 RX ORDER — SODIUM CHLORIDE 9 MG/ML
100 INJECTION, SOLUTION INTRAVENOUS CONTINUOUS
Status: DISCONTINUED | OUTPATIENT
Start: 2024-02-23 | End: 2024-02-27 | Stop reason: HOSPADM

## 2024-02-23 RX ORDER — SODIUM CHLORIDE 9 MG/ML
INJECTION, SOLUTION INTRAVENOUS CONTINUOUS PRN
Status: DISCONTINUED | OUTPATIENT
Start: 2024-02-23 | End: 2024-02-23

## 2024-02-23 RX ORDER — LIDOCAINE HYDROCHLORIDE 10 MG/ML
INJECTION, SOLUTION EPIDURAL; INFILTRATION; INTRACAUDAL; PERINEURAL AS NEEDED
Status: DISCONTINUED | OUTPATIENT
Start: 2024-02-23 | End: 2024-02-23

## 2024-02-23 RX ORDER — SODIUM CHLORIDE 9 MG/ML
100 INJECTION, SOLUTION INTRAVENOUS CONTINUOUS
Status: CANCELLED | OUTPATIENT
Start: 2024-02-23

## 2024-02-23 RX ADMIN — SODIUM CHLORIDE: 9 INJECTION, SOLUTION INTRAVENOUS at 10:06

## 2024-02-23 RX ADMIN — PROPOFOL 200 MG: 10 INJECTION, EMULSION INTRAVENOUS at 10:06

## 2024-02-23 RX ADMIN — PROPOFOL 50 MG: 10 INJECTION, EMULSION INTRAVENOUS at 10:09

## 2024-02-23 RX ADMIN — PROPOFOL 50 MG: 10 INJECTION, EMULSION INTRAVENOUS at 10:16

## 2024-02-23 RX ADMIN — PROPOFOL 50 MG: 10 INJECTION, EMULSION INTRAVENOUS at 10:19

## 2024-02-23 RX ADMIN — PROPOFOL 50 MG: 10 INJECTION, EMULSION INTRAVENOUS at 10:31

## 2024-02-23 RX ADMIN — PROPOFOL 50 MG: 10 INJECTION, EMULSION INTRAVENOUS at 10:11

## 2024-02-23 RX ADMIN — LIDOCAINE HYDROCHLORIDE 50 MG: 10 INJECTION, SOLUTION EPIDURAL; INFILTRATION; INTRACAUDAL; PERINEURAL at 10:06

## 2024-02-23 RX ADMIN — PROPOFOL 50 MG: 10 INJECTION, EMULSION INTRAVENOUS at 10:13

## 2024-02-23 RX ADMIN — PROPOFOL 50 MG: 10 INJECTION, EMULSION INTRAVENOUS at 10:23

## 2024-02-23 RX ADMIN — PROPOFOL 50 MG: 10 INJECTION, EMULSION INTRAVENOUS at 10:27

## 2024-02-23 RX ADMIN — PROPOFOL 50 MG: 10 INJECTION, EMULSION INTRAVENOUS at 10:34

## 2024-02-23 NOTE — H&P
History and Physical   Colon and Rectal Surgery   Gary G Moritz 63 y.o. male MRN: [id number]  Unit/Bed#:  Encounter: [id number]  02/23/24   @NOW    No chief complaint on file.        History of Present Illness   HPI:  Gary G Moritz is a 63 y.o. male who presents for screening colonoscopy.      Historical Information   Past Medical History:   Diagnosis Date    Acute URI     Anxiety     Arthritis     Chronic GERD     Hypertension     Peripheral neuropathy     Type 2 diabetes mellitus without complication, without long-term current use of insulin ([facility]) 01/09/2024     Past Surgical History:   Procedure Laterality Date    BACK SURGERY      lumbar 4-5    ELBOW SURGERY Right     Removal chips    JOINT REPLACEMENT Bilateral     Hip    LAMINECTOMY  2014    CA ARTHRP ACETBLR/PROX FEM PROSTC AGRFT/ALGRFT Right 01/12/2016    Procedure: ARTHROPLASTY HIP TOTAL ANTERIOR;  Surgeon: Malik Meyers DO;  [facility];  Service: Orthopedics    CA ARTHRP KNE CONDYLE&PLATU MEDIAL&LAT COMPARTMENTS Left 12/12/2016    Procedure: ARTHROPLASTY KNEE TOTAL;  Surgeon: Antoine Pena MD;  Location: [facility];  Service: Orthopedics    CA ARTHRP KNE CONDYLE&PLATU MEDIAL&LAT COMPARTMENTS Right 06/01/2020    Procedure: ARTHROPLASTY KNEE TOTAL;  Surgeon: Antoine Pena MD;  [facility];  Service: Orthopedics    CA RINSJ RPTD BICEPS/TRICEPS TDN DSTL W/WO TDN GRF Left 12/04/2019    Procedure: DISTAL BICEPS TENDON REPAIR;  Surgeon: Chauncey Perea MD;  [facility];  Service: Orthopedics       Meds/Allergies     (Not in a hospital admission)        Current Outpatient Medications:     acetaminophen (TYLENOL) 325 mg tablet, Take 2 tablets (650 mg total) by mouth every 6 (six) hours (Patient not taking: Reported on 6/8/2022), Disp: 30 tablet, Rfl: 0    allopurinol (ZYLOPRIM) 100 mg tablet, 100 mg daily, Disp: , Rfl:     ALPRAZolam (XANAX) 0.25 mg tablet, Take by mouth as needed , Disp: , Rfl:     amLODIPine (NORVASC) 10 mg  tablet, Take 10 mg by mouth daily , Disp: , Rfl:     b complex vitamins capsule, Take 1 capsule by mouth daily., Disp: , Rfl:     cholecalciferol (VITAMIN D3) 1,000 units tablet, Take 1,000 Units by mouth daily, Disp: , Rfl:     clotrimazole-betamethasone (LOTRISONE) 1-0.05 % cream, apply to affected area twice a day for 2 weeks AND SURROUNDING AR...  (REFER TO PRESCRIPTION NOTES)., Disp: , Rfl:     gabapentin (NEURONTIN) 100 mg capsule, 100 mg daily, Disp: , Rfl:     Multiple Vitamin (MULTIVITAMIN) capsule, Take 1 capsule by mouth daily., Disp: , Rfl:     Omega-3 Fatty Acids (FISH OIL) 1200 MG CAPS, Take 1 capsule by mouth daily, Disp: , Rfl:     omeprazole (PriLOSEC) 20 mg delayed release capsule, Take 1 capsule (20 mg total) by mouth daily, Disp: 90 capsule, Rfl: 0    rosuvastatin (CRESTOR) 10 MG tablet, TAKE 1 TABLET BY MOUTH EVERY DAY, Disp: 90 tablet, Rfl: 2    triamterene-hydrochlorothiazide (MAXZIDE-25) 37.5-25 mg per tablet, Take 1 tablet by mouth daily , Disp: , Rfl:     Allergies   Allergen Reactions    Oxycodone Itching         Social History   Social History     Substance and Sexual Activity   Alcohol Use Yes    Alcohol/week: 3.0 standard drinks of alcohol    Types: 3 Cans of beer per week     Social History     Substance and Sexual Activity   Drug Use Not Currently    Types: Marijuana     Social History     Tobacco Use   Smoking Status Former    Current packs/day: 0.00    Types: Cigarettes    Quit date: 5/18/2020    Years since quitting: 3.7   Smokeless Tobacco Never   Tobacco Comments    social smoker         Family History:   Family History   Problem Relation Age of Onset    Hypertension Mother          Objective     Current Vitals:      No intake or output data in the 24 hours ending 02/23/24 0836    Physical Exam:  General:  Resting comfortably in bed   Eyes:Sclera anicteric  ENT: Trachea midline  Pulm:  Symmetric chest raise.  No respiratory Distress  CV:  Regular on monitor  Abdomen:  Soft NT  ND  Extremities:  No clubbing/ cyanosis/ edema    Lab Results: I have personally reviewed pertinent lab results.    Imaging: I have personally reviewed pertinent reports.        ASSESSMENT:  Gary G Moritz is a 63 y.o. male who presents for outpatient colonoscopy.      PLAN:  For colonoscopy    Risks/ Benefits reviewed to include but not limited to anesthesia, bleeding, missed lesions, and colonoscopic perforation requiring surgery.

## 2024-02-23 NOTE — ANESTHESIA POSTPROCEDURE EVALUATION
Post-Op Assessment Note    CV Status:  Stable  Pain Score: 0    Pain management: adequate       Mental Status:  Awake and somnolent   Hydration Status:  Stable   PONV Controlled:  None   Airway Patency:  Patent     Post Op Vitals Reviewed: Yes    No anethesia notable event occurred.    Staff: CRNA               BP   112/54 (78)   Temp      Pulse   71   Resp   18   SpO2   95% on RA

## 2024-02-23 NOTE — ANESTHESIA PREPROCEDURE EVALUATION
Procedure:  COLONOSCOPY    Relevant Problems   ANESTHESIA (within normal limits)   (-) History of anesthesia complications      CARDIO   (+) Primary hypertension      ENDO   (+) Type 2 diabetes mellitus without complication, without long-term current use of insulin (HCC)      GI/HEPATIC   (+) GERD (gastroesophageal reflux disease)      /RENAL (within normal limits)      HEMATOLOGY (within normal limits)      MUSCULOSKELETAL   (+) Chronic idiopathic gout involving toe      NEURO/PSYCH   (+) Anxiety      PULMONARY (within normal limits)      Other   (+) Obesity (BMI 30-39.9)        Physical Exam    Airway    Mallampati score: II  TM Distance: >3 FB  Neck ROM: full     Dental   No notable dental hx     Cardiovascular  Rhythm: regular, Rate: normal, Cardiovascular exam normal    Pulmonary  Pulmonary exam normal Breath sounds clear to auscultation    Other Findings        Anesthesia Plan  ASA Score- 3     Anesthesia Type- IV sedation with anesthesia with ASA Monitors.         Additional Monitors:     Airway Plan:            Plan Factors-Exercise tolerance (METS): >4 METS.    Chart reviewed. EKG reviewed. Imaging results reviewed. Existing labs reviewed. Patient summary reviewed.    Patient is not a current smoker.  Patient did not smoke on day of surgery.            Induction- intravenous.    Postoperative Plan-     Informed Consent- Anesthetic plan and risks discussed with patient and spouse.  I personally reviewed this patient with the CRNA. Discussed and agreed on the Anesthesia Plan with the CRNA..            NPO and allergies verified.  Patient took amlodipine and Maxzide this morning, 2/23/24.  Preprocedure glucose was 103.    Plan:  IV sedation, GA backup    Benefits and risks of sedation were discussed with the patient including possibility of recall under sedation and the potential for conversion to general anesthesia if necessary.  All questions were answered.  Anesthesia consent was obtained from the  patient.

## 2024-02-24 DIAGNOSIS — K21.9 GASTROESOPHAGEAL REFLUX DISEASE, UNSPECIFIED WHETHER ESOPHAGITIS PRESENT: ICD-10-CM

## 2024-02-24 RX ORDER — OMEPRAZOLE 20 MG/1
20 CAPSULE, DELAYED RELEASE ORAL DAILY
Qty: 90 CAPSULE | Refills: 1 | Status: SHIPPED | OUTPATIENT
Start: 2024-02-24

## 2024-02-26 PROCEDURE — 88305 TISSUE EXAM BY PATHOLOGIST: CPT | Performed by: PATHOLOGY

## 2024-03-20 ENCOUNTER — APPOINTMENT (OUTPATIENT)
Dept: LAB | Facility: HOSPITAL | Age: 64
End: 2024-03-20

## 2024-03-20 DIAGNOSIS — Z00.8 HEALTH EXAMINATION IN POPULATION SURVEY: ICD-10-CM

## 2024-03-20 LAB
CHOLEST SERPL-MCNC: 136 MG/DL
EST. AVERAGE GLUCOSE BLD GHB EST-MCNC: 128 MG/DL
HBA1C MFR BLD: 6.1 %
HDLC SERPL-MCNC: 50 MG/DL
LDLC SERPL CALC-MCNC: 69 MG/DL (ref 0–100)
NONHDLC SERPL-MCNC: 86 MG/DL
TRIGL SERPL-MCNC: 84 MG/DL

## 2024-03-20 PROCEDURE — 36415 COLL VENOUS BLD VENIPUNCTURE: CPT

## 2024-03-20 PROCEDURE — 80061 LIPID PANEL: CPT

## 2024-03-20 PROCEDURE — 83036 HEMOGLOBIN GLYCOSYLATED A1C: CPT

## 2024-04-01 ENCOUNTER — OFFICE VISIT (OUTPATIENT)
Dept: FAMILY MEDICINE CLINIC | Facility: CLINIC | Age: 64
End: 2024-04-01
Payer: COMMERCIAL

## 2024-04-01 VITALS
HEIGHT: 72 IN | HEART RATE: 76 BPM | RESPIRATION RATE: 16 BRPM | WEIGHT: 244 LBS | DIASTOLIC BLOOD PRESSURE: 80 MMHG | BODY MASS INDEX: 33.05 KG/M2 | OXYGEN SATURATION: 97 % | SYSTOLIC BLOOD PRESSURE: 120 MMHG

## 2024-04-01 DIAGNOSIS — E79.0 HYPERURICEMIA: ICD-10-CM

## 2024-04-01 DIAGNOSIS — G57.93 NEUROPATHY OF BOTH FEET: ICD-10-CM

## 2024-04-01 DIAGNOSIS — R60.0 BILATERAL LEG EDEMA: ICD-10-CM

## 2024-04-01 DIAGNOSIS — I10 PRIMARY HYPERTENSION: ICD-10-CM

## 2024-04-01 DIAGNOSIS — Z12.5 PROSTATE CANCER SCREENING: ICD-10-CM

## 2024-04-01 DIAGNOSIS — E66.9 OBESITY (BMI 30-39.9): ICD-10-CM

## 2024-04-01 DIAGNOSIS — R73.01 IMPAIRED FASTING GLUCOSE: Primary | ICD-10-CM

## 2024-04-01 DIAGNOSIS — Z23 NEED FOR PROPHYLACTIC VACCINATION AGAINST STREPTOCOCCUS PNEUMONIAE (PNEUMOCOCCUS): ICD-10-CM

## 2024-04-01 DIAGNOSIS — E78.2 MIXED HYPERLIPIDEMIA: ICD-10-CM

## 2024-04-01 PROBLEM — M79.672 PAIN IN BOTH FEET: Status: RESOLVED | Noted: 2023-11-22 | Resolved: 2024-04-01

## 2024-04-01 PROBLEM — Z86.010 HISTORY OF COLON POLYPS: Status: ACTIVE | Noted: 2024-04-01

## 2024-04-01 PROBLEM — Z86.0100 HISTORY OF COLON POLYPS: Status: ACTIVE | Noted: 2024-04-01

## 2024-04-01 PROBLEM — M1A.0790 CHRONIC IDIOPATHIC GOUT INVOLVING TOE: Status: RESOLVED | Noted: 2023-11-22 | Resolved: 2024-04-01

## 2024-04-01 PROBLEM — M79.671 PAIN IN BOTH FEET: Status: RESOLVED | Noted: 2023-11-22 | Resolved: 2024-04-01

## 2024-04-01 PROCEDURE — 90677 PCV20 VACCINE IM: CPT | Performed by: FAMILY MEDICINE

## 2024-04-01 PROCEDURE — 99204 OFFICE O/P NEW MOD 45 MIN: CPT | Performed by: FAMILY MEDICINE

## 2024-04-01 PROCEDURE — 90471 IMMUNIZATION ADMIN: CPT | Performed by: FAMILY MEDICINE

## 2024-04-01 RX ORDER — GABAPENTIN 300 MG/1
300 CAPSULE ORAL
Qty: 90 CAPSULE | Refills: 1 | Status: SHIPPED | OUTPATIENT
Start: 2024-04-01

## 2024-04-01 NOTE — ASSESSMENT & PLAN NOTE
Hypertension is well-controlled with blood pressure 120/80.  Patient is on amlodipine as well as Dyazide.  Has been on both these medications for years.  Triamterene/hydrochlorothiazide can contribute to hyperuricemia.  Amlodipine can contribute to lower extremity peripheral edema.  Ideally I would discontinue both of those medications and start him on angiotensin receptor blocker such as losartan or Diovan.  No change at this time

## 2024-04-01 NOTE — ASSESSMENT & PLAN NOTE
Last uric acid level was at 6.4 in January.  He is on allopurinol 100 mg daily.  He did stop drinking beer.  Thiazide could contribute to hyperuricemia.  Continue on allopurinol 100 mg daily for now and will test his uric acid level in June.  If his uric acid level is low then may consider discontinuation of allopurinol

## 2024-04-01 NOTE — PROGRESS NOTES
Subjective:      Patient ID: Gary G Moritz is a 63 y.o. male.    63-year-old male presents as new patient to the practice.  Patient has history of hypertension, hyperuricemia, DJD of multiple sites, history of hip arthroplasties, knee arthroplasties.  Patient was previously seen by primary care physicians from [facility] but now works for [employer].  Establish with [facility] internal medicine and was diagnosed with diabetes mellitus type 2 after having hemoglobin A1c at 6.5% back in January.  Patient was never given any instructions for management and was not placed on medications.  He was simply given diagnosis.  Patient has cut down on carbohydrates including beer, liquor, breads and is eating a  diet and has lost 16 pounds.  He did perform lipid panel and hemoglobin A1c for caring starts with you program and his A1c improved to 6.1%.  He is on no medications.  He does have neuropathy of bilateral feet which she has had for years for which she does take gabapentin.  This does help at night when he is laying in bed which is when his neuropathy is most bothersome.  Patient has been on amlodipine as well as Dyazide for years.  Back in 2020 he did have gouty flare with hyperuricemia with uric acid level of 8.3.  He has been on allopurinol ever since        Past Medical History:   Diagnosis Date   • Acute URI    • Anxiety    • Arthritis    • Chronic GERD    • Hypertension    • Peripheral neuropathy    • Type 2 diabetes mellitus without complication, without long-term current use of insulin ([facility]) 01/09/2024       Family History   Problem Relation Age of Onset   • Hypertension Mother        Past Surgical History:   Procedure Laterality Date   • BACK SURGERY      lumbar 4-5   • ELBOW SURGERY Right     Removal chips   • JOINT REPLACEMENT Bilateral     Hip   • LAMINECTOMY  2014   • VA ARTHRP ACETBLR/PROX FEM PROSTC AGRFT/ALGRFT Right 01/12/2016    Procedure: ARTHROPLASTY HIP TOTAL ANTERIOR;  Surgeon:  Malik Meyers, ;  Location: [facility];  Service: Orthopedics   • OK ARTHRP KNE CONDYLE&PLATU MEDIAL&LAT COMPARTMENTS Left 12/12/2016    Procedure: ARTHROPLASTY KNEE TOTAL;  Surgeon: Antoine Pena MD;  Location[facility];  Service: Orthopedics   • OK ARTHRP KNE CONDYLE&PLATU MEDIAL&LAT COMPARTMENTS Right 06/01/2020    Procedure: ARTHROPLASTY KNEE TOTAL;  Surgeon: Antoine Pena MD;  Location[facility];  Service: Orthopedics   • OK RINSJ RPTD BICEPS/TRICEPS TDN DSTL W/WO TDN GRF Left 12/04/2019    Procedure: DISTAL BICEPS TENDON REPAIR;  Surgeon: Chauncey Perea MD;  [facility];  Service: Orthopedics        reports that he quit smoking about 3 years ago. His smoking use included cigarettes. He has never used smokeless tobacco. He reports current alcohol use of about 3.0 standard drinks of alcohol per week. He reports that he does not currently use drugs after having used the following drugs: Marijuana.      Current Outpatient Medications:   •  gabapentin (NEURONTIN) 300 mg capsule, Take 1 capsule (300 mg total) by mouth daily at bedtime, Disp: 90 capsule, Rfl: 1  •  allopurinol (ZYLOPRIM) 100 mg tablet, 100 mg daily, Disp: , Rfl:   •  ALPRAZolam (XANAX) 0.25 mg tablet, Take by mouth as needed , Disp: , Rfl:   •  amLODIPine (NORVASC) 10 mg tablet, Take 10 mg by mouth daily , Disp: , Rfl:   •  b complex vitamins capsule, Take 1 capsule by mouth daily., Disp: , Rfl:   •  cholecalciferol (VITAMIN D3) 1,000 units tablet, Take 1,000 Units by mouth daily, Disp: , Rfl:   •  clotrimazole-betamethasone (LOTRISONE) 1-0.05 % cream, apply to affected area twice a day for 2 weeks AND SURROUNDING AR...  (REFER TO PRESCRIPTION NOTES)., Disp: , Rfl:   •  Multiple Vitamin (MULTIVITAMIN) capsule, Take 1 capsule by mouth daily., Disp: , Rfl:   •  Omega-3 Fatty Acids (FISH OIL) 1200 MG CAPS, Take 1 capsule by mouth daily, Disp: , Rfl:   •  omeprazole (PriLOSEC) 20 mg delayed release capsule, take 1 capsule by  mouth once daily, Disp: 90 capsule, Rfl: 1  •  rosuvastatin (CRESTOR) 10 MG tablet, TAKE 1 TABLET BY MOUTH EVERY DAY, Disp: 90 tablet, Rfl: 2  •  triamterene-hydrochlorothiazide (MAXZIDE-25) 37.5-25 mg per tablet, Take 1 tablet by mouth daily , Disp: , Rfl:     The following portions of the patient's history were reviewed and updated as appropriate: allergies, current medications, past family history, past medical history, past social history, past surgical history and problem list.    Review of Systems   Constitutional:  Positive for unexpected weight change (Has lost weight with dietary modification).   HENT: Negative.     Eyes: Negative.    Respiratory: Negative.     Cardiovascular:  Positive for leg swelling (Lower extremity edema).   Gastrointestinal: Negative.    Endocrine: Negative.    Genitourinary: Negative.    Musculoskeletal: Negative.    Skin: Negative.    Allergic/Immunologic: Negative.    Neurological:  Positive for numbness (Bilateral feet).   Hematological: Negative.    Psychiatric/Behavioral: Negative.     All other systems reviewed and are negative.          Objective:    /80   Pulse 76   Resp 16   Ht 6' (1.829 m)   Wt 111 kg (244 lb)   SpO2 97%   BMI 33.09 kg/m²      Physical Exam  Vitals and nursing note reviewed.   Constitutional:       General: He is not in acute distress.     Appearance: Normal appearance. He is well-developed. He is obese. He is not ill-appearing.   HENT:      Head: Normocephalic and atraumatic.      Right Ear: Tympanic membrane, ear canal and external ear normal.      Left Ear: Tympanic membrane, ear canal and external ear normal.      Nose: Nose normal.      Mouth/Throat:      Mouth: Mucous membranes are moist.   Eyes:      Extraocular Movements: Extraocular movements intact.      Conjunctiva/sclera: Conjunctivae normal.      Pupils: Pupils are equal, round, and reactive to light.   Cardiovascular:      Rate and Rhythm: Normal rate and regular rhythm.       Pulses: Normal pulses.      Heart sounds: Normal heart sounds. No murmur heard.  Pulmonary:      Effort: Pulmonary effort is normal.      Breath sounds: Normal breath sounds.   Abdominal:      General: Abdomen is flat. Bowel sounds are normal.      Palpations: Abdomen is soft.   Musculoskeletal:         General: Normal range of motion.      Cervical back: Normal range of motion and neck supple.      Right lower leg: Edema (1+ pitting to mid shin) present.      Left lower leg: Edema (1+ pitting to mid shin) present.   Skin:     General: Skin is warm and dry.   Neurological:      General: No focal deficit present.      Mental Status: He is alert and oriented to person, place, and time.   Psychiatric:         Mood and Affect: Mood normal.         Behavior: Behavior normal.         Thought Content: Thought content normal.         Judgment: Judgment normal.           Recent Results (from the past 1008 hour(s))   Fingerstick Glucose (POCT)    Collection Time: 02/23/24  9:44 AM   Result Value Ref Range    POC Glucose 103 65 - 140 mg/dl   Tissue Exam    Collection Time: 02/23/24 10:13 AM   Result Value Ref Range    Case Report       Surgical Pathology Report                         Case: [id number]                                  Authorizing Provider:  Tuan Velasco MD   Collected:           02/23/2024 1013              Ordering Location:     [facility]      Received:            02/23/2024 1135                                     Hospital Endoscopy                                                           Pathologist:           Mary Ann Charles MD                                                                    Specimens:   A) - Polyp, Colorectal, cecal polyp, hot snare                                                      B) - Polyp, Colorectal, transverse colon polyp, hot snare                                           C) - Polyp, Colorectal, sigmoid colon polyps x6, hot snare                                  Final Diagnosis       A. Polyp, cecal polyp, hot snare:  -   Fragments of tubular adenoma.  -   Negative for high grade dysplasia and malignancy.   -   Resection margins are not visualized.    B. Polyp, transverse colon polyp, hot snare:  -   Fragments of tubular adenoma.  -   Negative for high grade dysplasia and malignancy.   -   Deep resection margin is negative for adenomatous epithelium.    -   Mucosal resection margins are not visualized.    C. Polyp, sigmoid colon polyps x6, hot snare:  -   Tubular adenoma (0.6 cm). Resection margins are negative for adenomatous epithelium.  -   Tubular adenoma (0.8 cm). Resection margins are negative for adenomatous epithelium.  -   Tubular adenoma (1.0 cm). Resection margins are negative for adenomatous epithelium.  -   Tubulovillous adenoma (1.5 cm). Resection margins are negative for adenomatous epithelium.  -   Tubulovillous adenoma (1.8 cm). Resection margins are negative for adenomatous epithelium.  -   Tubulovillous adenoma (2.0 cm). Resection margins are negative for adenomatous epithelium.  -   Negative for high grade dysplasia and malignancy.       Interpretation performed at [facility] [address] [address]       Additional Information       All reported additional testing was performed with appropriately reactive controls.  These tests were developed and their performance characteristics determined by [facility] or appropriate performing facility, though some tests may be performed on tissues which have not been validated for performance characteristics (such as staining performed on alcohol exposed cell blocks and decalcified tissues).  Results should be interpreted with caution and in the context of the patients’ clinical condition. These tests may not be cleared or approved by the U.S. Food and Drug Administration, though the FDA has determined that such clearance or approval is not necessary. These tests are used  "for clinical purposes and they should not be regarded as investigational or for research. This laboratory has been approved by [place] [address], designated as a high-complexity laboratory and is qualified to perform these tests.  .      Synoptic Checklist          COLON/RECTUM POLYP FORM - GI - All Specimens          :    Adenoma(s)      Gross Description       A. The specimen is received in formalin, labeled with the patient's name and hospital number, and is designated \" cecal polyp\".  The specimen consists of 3 tan soft tissue fragments measuring range from 0.2-0.4 cm in greatest dimension.  Also present in container are present for particle/debris.  Entirely submitted. One screened cassette.  B. The specimen is received in formalin, labeled with the patient's name and hospital number, and is designated \" transverse colon polyp\".  The specimen consists of 2 tan soft tissue fragments measuring 0.3 and 0.6 cm in greatest dimension.  Entirely submitted. One screened cassette.  C. The specimen is received in formalin, labeled with the patient's name and hospital number, and is designated \" sigmoid colon polyp x 6\".  The specimen consists of 6 tan-brown, pedunculated, friable polyps measuring in range from 0.5-2 cm in greatest dimension.  Each polyp is inked blue at the presumed margin of resection and is serially sectioned.  Entirely submitted.  13 screened cassette.    C1:Polyp measuring 0.6 x 0.5 x 0.3 cm, bisected.  C2:Polyp measuring 0.8 x 0.5 x 0.4 cm, bisected  C3:Polyp measuring 1 x 0.7 x 0.6 cm, bisected.  C4-6:Polyp measuring 1.5 x 1.3 x 1.2 cm, trisected  C 7-10:Polyp measuring 1.8 x 1.2 x 1 cm, serially sectioned  C 11-13:Polyp measuring 2 x 1.2 x 1.1 cm, trisected.    Note: The estimated total formalin fixation time based upon information provided by the submitting clinician and the standard processing schedule is under 72 hours.      [place]     Hemoglobin A1C    Collection Time: 03/20/24  9:40 AM   Result Value " Ref Range    Hemoglobin A1C 6.1 (H) Normal 4.0-5.6%; PreDiabetic 5.7-6.4%; Diabetic >=6.5%; Glycemic control for adults with diabetes <7.0% %     mg/dl   Lipid panel    Collection Time: 03/20/24  9:40 AM   Result Value Ref Range    Cholesterol 136 See Comment mg/dL    Triglycerides 84 See Comment mg/dL    HDL, Direct 50 >=40 mg/dL    LDL Calculated 69 0 - 100 mg/dL    Non-HDL-Chol (CHOL-HDL) 86 mg/dl       Assessment/Plan:    Primary hypertension  Hypertension is well-controlled with blood pressure 120/80.  Patient is on amlodipine as well as Dyazide.  Has been on both these medications for years.  Triamterene/hydrochlorothiazide can contribute to hyperuricemia.  Amlodipine can contribute to lower extremity peripheral edema.  Ideally I would discontinue both of those medications and start him on angiotensin receptor blocker such as losartan or Diovan.  No change at this time    Impaired fasting glucose  Most recent hemoglobin A1c at 6.1%.  Patient has lost weight.  I would not consider him to be a diabetic.  That diagnosis was removed from his chart.  Patient will continue to work on losing weight and will continue to watch dietary intake of carbohydrates.  Repeat A1c in 2 months    Neuropathy of both feet  Patient states that the neuropathy has been present since having hip surgery.  More bothersome at night.  Patient still experiences it despite being on gabapentin 100 mg.  Increase dose to 300 mg at bedtime.    Bilateral leg edema  Could be related to his calcium channel blocker or it could also be related to his history of hip and knee surgery on both sides.  Only way to tell would be to discontinue amlodipine    Obesity (BMI 30-39.9)  Patient is working on losing weight.  He and his wife are watching dietary intake of carbohydrates.  He is physically active with work    Prostate cancer screening  Screening PSA to be done with next set of labs    Need for prophylactic vaccination against Streptococcus  pneumoniae (pneumococcus)  Prevnar 20 provided in the office    Hyperuricemia  Last uric acid level was at 6.4 in January.  He is on allopurinol 100 mg daily.  He did stop drinking beer.  Thiazide could contribute to hyperuricemia.  Continue on allopurinol 100 mg daily for now and will test his uric acid level in June.  If his uric acid level is low then may consider discontinuation of allopurinol          Problem List Items Addressed This Visit        Cardiovascular and Mediastinum    Primary hypertension     Hypertension is well-controlled with blood pressure 120/80.  Patient is on amlodipine as well as Dyazide.  Has been on both these medications for years.  Triamterene/hydrochlorothiazide can contribute to hyperuricemia.  Amlodipine can contribute to lower extremity peripheral edema.  Ideally I would discontinue both of those medications and start him on angiotensin receptor blocker such as losartan or Diovan.  No change at this time         Relevant Orders    CBC and differential    TSH, 3rd generation with Free T4 reflex       Endocrine    Impaired fasting glucose - Primary     Most recent hemoglobin A1c at 6.1%.  Patient has lost weight.  I would not consider him to be a diabetic.  That diagnosis was removed from his chart.  Patient will continue to work on losing weight and will continue to watch dietary intake of carbohydrates.  Repeat A1c in 2 months         Relevant Orders    Hemoglobin A1C       Nervous and Auditory    Neuropathy of both feet     Patient states that the neuropathy has been present since having hip surgery.  More bothersome at night.  Patient still experiences it despite being on gabapentin 100 mg.  Increase dose to 300 mg at bedtime.         Relevant Medications    gabapentin (NEURONTIN) 300 mg capsule       Surgery/Wound/Pain    Bilateral leg edema     Could be related to his calcium channel blocker or it could also be related to his history of hip and knee surgery on both sides.  Only  way to tell would be to discontinue amlodipine            Other    Hyperuricemia     Last uric acid level was at 6.4 in January.  He is on allopurinol 100 mg daily.  He did stop drinking beer.  Thiazide could contribute to hyperuricemia.  Continue on allopurinol 100 mg daily for now and will test his uric acid level in June.  If his uric acid level is low then may consider discontinuation of allopurinol         Relevant Orders    Uric acid    Need for prophylactic vaccination against Streptococcus pneumoniae (pneumococcus)     Prevnar 20 provided in the office         Relevant Orders    Pneumococcal Conjugate Vaccine 20-valent (Pcv20) (Completed)    Obesity (BMI 30-39.9)     Patient is working on losing weight.  He and his wife are watching dietary intake of carbohydrates.  He is physically active with work         Prostate cancer screening     Screening PSA to be done with next set of labs         Relevant Orders    PSA, Total Screen   Other Visit Diagnoses     Mixed hyperlipidemia        Relevant Orders    Comprehensive metabolic panel    Lipid panel

## 2024-04-01 NOTE — ASSESSMENT & PLAN NOTE
Patient is working on losing weight.  He and his wife are watching dietary intake of carbohydrates.  He is physically active with work

## 2024-04-01 NOTE — ASSESSMENT & PLAN NOTE
Could be related to his calcium channel blocker or it could also be related to his history of hip and knee surgery on both sides.  Only way to tell would be to discontinue amlodipine

## 2024-04-01 NOTE — ASSESSMENT & PLAN NOTE
Most recent hemoglobin A1c at 6.1%.  Patient has lost weight.  I would not consider him to be a diabetic.  That diagnosis was removed from his chart.  Patient will continue to work on losing weight and will continue to watch dietary intake of carbohydrates.  Repeat A1c in 2 months

## 2024-04-01 NOTE — ASSESSMENT & PLAN NOTE
Patient states that the neuropathy has been present since having hip surgery.  More bothersome at night.  Patient still experiences it despite being on gabapentin 100 mg.  Increase dose to 300 mg at bedtime.

## 2024-05-01 PROBLEM — Z12.5 PROSTATE CANCER SCREENING: Status: RESOLVED | Noted: 2024-04-01 | Resolved: 2024-05-01

## 2024-06-04 ENCOUNTER — TELEPHONE (OUTPATIENT)
Age: 64
End: 2024-06-04

## 2024-06-04 NOTE — TELEPHONE ENCOUNTER
Patient called in and wanted to make sure lab orders were ordered. Patient was made aware that fasting labs were ordered. NFA needed at this time.

## 2024-06-11 ENCOUNTER — APPOINTMENT (OUTPATIENT)
Dept: LAB | Facility: CLINIC | Age: 64
End: 2024-06-11
Payer: COMMERCIAL

## 2024-06-11 DIAGNOSIS — I10 PRIMARY HYPERTENSION: ICD-10-CM

## 2024-06-11 DIAGNOSIS — E78.2 MIXED HYPERLIPIDEMIA: ICD-10-CM

## 2024-06-11 DIAGNOSIS — Z12.5 PROSTATE CANCER SCREENING: ICD-10-CM

## 2024-06-11 DIAGNOSIS — R73.01 IMPAIRED FASTING GLUCOSE: ICD-10-CM

## 2024-06-11 DIAGNOSIS — E79.0 HYPERURICEMIA: ICD-10-CM

## 2024-06-11 LAB
ALBUMIN SERPL BCP-MCNC: 4.4 G/DL (ref 3.5–5)
ALP SERPL-CCNC: 51 U/L (ref 34–104)
ALT SERPL W P-5'-P-CCNC: 25 U/L (ref 7–52)
ANION GAP SERPL CALCULATED.3IONS-SCNC: 6 MMOL/L (ref 4–13)
AST SERPL W P-5'-P-CCNC: 21 U/L (ref 13–39)
BASOPHILS # BLD AUTO: 0.09 THOUSANDS/ÂΜL (ref 0–0.1)
BASOPHILS NFR BLD AUTO: 2 % (ref 0–1)
BILIRUB SERPL-MCNC: 0.85 MG/DL (ref 0.2–1)
BUN SERPL-MCNC: 15 MG/DL (ref 5–25)
CALCIUM SERPL-MCNC: 9.2 MG/DL (ref 8.4–10.2)
CHLORIDE SERPL-SCNC: 108 MMOL/L (ref 96–108)
CHOLEST SERPL-MCNC: 143 MG/DL
CO2 SERPL-SCNC: 25 MMOL/L (ref 21–32)
CREAT SERPL-MCNC: 0.75 MG/DL (ref 0.6–1.3)
EOSINOPHIL # BLD AUTO: 0.25 THOUSAND/ÂΜL (ref 0–0.61)
EOSINOPHIL NFR BLD AUTO: 5 % (ref 0–6)
ERYTHROCYTE [DISTWIDTH] IN BLOOD BY AUTOMATED COUNT: 16.6 % (ref 11.6–15.1)
EST. AVERAGE GLUCOSE BLD GHB EST-MCNC: 128 MG/DL
GFR SERPL CREATININE-BSD FRML MDRD: 97 ML/MIN/1.73SQ M
GLUCOSE P FAST SERPL-MCNC: 129 MG/DL (ref 65–99)
HBA1C MFR BLD: 6.1 %
HCT VFR BLD AUTO: 43.8 % (ref 36.5–49.3)
HDLC SERPL-MCNC: 54 MG/DL
HGB BLD-MCNC: 14 G/DL (ref 12–17)
IMM GRANULOCYTES # BLD AUTO: 0.02 THOUSAND/UL (ref 0–0.2)
IMM GRANULOCYTES NFR BLD AUTO: 0 % (ref 0–2)
LDLC SERPL CALC-MCNC: 75 MG/DL (ref 0–100)
LYMPHOCYTES # BLD AUTO: 1.8 THOUSANDS/ÂΜL (ref 0.6–4.47)
LYMPHOCYTES NFR BLD AUTO: 32 % (ref 14–44)
MCH RBC QN AUTO: 26.3 PG (ref 26.8–34.3)
MCHC RBC AUTO-ENTMCNC: 32 G/DL (ref 31.4–37.4)
MCV RBC AUTO: 82 FL (ref 82–98)
MONOCYTES # BLD AUTO: 0.73 THOUSAND/ÂΜL (ref 0.17–1.22)
MONOCYTES NFR BLD AUTO: 13 % (ref 4–12)
NEUTROPHILS # BLD AUTO: 2.7 THOUSANDS/ÂΜL (ref 1.85–7.62)
NEUTS SEG NFR BLD AUTO: 48 % (ref 43–75)
NONHDLC SERPL-MCNC: 89 MG/DL
NRBC BLD AUTO-RTO: 0 /100 WBCS
PLATELET # BLD AUTO: 289 THOUSANDS/UL (ref 149–390)
PMV BLD AUTO: 10.3 FL (ref 8.9–12.7)
POTASSIUM SERPL-SCNC: 4 MMOL/L (ref 3.5–5.3)
PROT SERPL-MCNC: 7.1 G/DL (ref 6.4–8.4)
PSA SERPL-MCNC: 0.69 NG/ML (ref 0–4)
RBC # BLD AUTO: 5.32 MILLION/UL (ref 3.88–5.62)
SODIUM SERPL-SCNC: 139 MMOL/L (ref 135–147)
TRIGL SERPL-MCNC: 71 MG/DL
TSH SERPL DL<=0.05 MIU/L-ACNC: 3.28 UIU/ML (ref 0.45–4.5)
URATE SERPL-MCNC: 6 MG/DL (ref 3.5–8.5)
WBC # BLD AUTO: 5.59 THOUSAND/UL (ref 4.31–10.16)

## 2024-06-11 PROCEDURE — 80061 LIPID PANEL: CPT

## 2024-06-11 PROCEDURE — 83036 HEMOGLOBIN GLYCOSYLATED A1C: CPT

## 2024-06-11 PROCEDURE — G0103 PSA SCREENING: HCPCS

## 2024-06-11 PROCEDURE — 80053 COMPREHEN METABOLIC PANEL: CPT

## 2024-06-11 PROCEDURE — 84443 ASSAY THYROID STIM HORMONE: CPT

## 2024-06-11 PROCEDURE — 84550 ASSAY OF BLOOD/URIC ACID: CPT

## 2024-06-11 PROCEDURE — 85025 COMPLETE CBC W/AUTO DIFF WBC: CPT

## 2024-06-11 PROCEDURE — 36415 COLL VENOUS BLD VENIPUNCTURE: CPT

## 2024-06-17 ENCOUNTER — OFFICE VISIT (OUTPATIENT)
Dept: FAMILY MEDICINE CLINIC | Facility: CLINIC | Age: 64
End: 2024-06-17
Payer: COMMERCIAL

## 2024-06-17 VITALS
HEART RATE: 74 BPM | HEIGHT: 72 IN | OXYGEN SATURATION: 98 % | BODY MASS INDEX: 32.1 KG/M2 | SYSTOLIC BLOOD PRESSURE: 122 MMHG | DIASTOLIC BLOOD PRESSURE: 62 MMHG | WEIGHT: 237 LBS

## 2024-06-17 DIAGNOSIS — E79.0 HYPERURICEMIA: ICD-10-CM

## 2024-06-17 DIAGNOSIS — E78.2 MIXED HYPERLIPIDEMIA: ICD-10-CM

## 2024-06-17 DIAGNOSIS — R60.0 BILATERAL LEG EDEMA: Primary | ICD-10-CM

## 2024-06-17 DIAGNOSIS — I10 PRIMARY HYPERTENSION: ICD-10-CM

## 2024-06-17 DIAGNOSIS — F41.9 ANXIETY: ICD-10-CM

## 2024-06-17 DIAGNOSIS — R73.01 IMPAIRED FASTING GLUCOSE: ICD-10-CM

## 2024-06-17 PROCEDURE — 99214 OFFICE O/P EST MOD 30 MIN: CPT | Performed by: FAMILY MEDICINE

## 2024-06-17 RX ORDER — LOSARTAN POTASSIUM 50 MG/1
50 TABLET ORAL DAILY
Qty: 90 TABLET | Refills: 1 | Status: SHIPPED | OUTPATIENT
Start: 2024-06-17

## 2024-06-17 RX ORDER — ALPRAZOLAM 0.25 MG/1
0.25 TABLET ORAL 3 TIMES DAILY PRN
Qty: 90 TABLET | Refills: 1 | Status: SHIPPED | OUTPATIENT
Start: 2024-06-17

## 2024-06-17 NOTE — ASSESSMENT & PLAN NOTE
His blood pressure is well-controlled however I would like to get him off of thiazide and amlodipine.  Amlodipine could contribute to his lower extremity edema and the thiazide could contribute to hyperuricemia.  Discontinue both medications    -Patient will be started on losartan 50 mg once daily.  He will keep track of blood pressure readings either through blood pressure checks at local pharmacy, supermarket or even doctors offices that he visits.  May need to be on higher dosage of losartan    Reevaluate in 3 months in the office

## 2024-06-17 NOTE — ASSESSMENT & PLAN NOTE
Taking as needed alprazolam.  Refill provided.  Mostly takes that at night to help with insomnia    Prior to prescribing the controlled substance, a patient search was performed on the [place] prescription drug monitoring program web site.  There was no evidence of diversion or misuse.  Prescription provided

## 2024-06-17 NOTE — ASSESSMENT & PLAN NOTE
Discontinue amlodipine.  Lower extremity edema could be caused by his calcium channel blocker.  Will also discontinue thiazide

## 2024-06-17 NOTE — ASSESSMENT & PLAN NOTE
Patient remains on allopurinol 100 mg daily.  No recent gouty flares.  Last uric acid level at 6.0.  Discontinue thiazide diuretic and repeat uric acid level in 3 months

## 2024-07-15 ENCOUNTER — OFFICE VISIT (OUTPATIENT)
Dept: OBGYN CLINIC | Facility: OTHER | Age: 64
End: 2024-07-15
Payer: COMMERCIAL

## 2024-07-15 VITALS
DIASTOLIC BLOOD PRESSURE: 77 MMHG | HEART RATE: 59 BPM | HEIGHT: 72 IN | SYSTOLIC BLOOD PRESSURE: 133 MMHG | BODY MASS INDEX: 32.51 KG/M2 | WEIGHT: 240 LBS

## 2024-07-15 DIAGNOSIS — M70.22 OLECRANON BURSITIS OF LEFT ELBOW: Primary | ICD-10-CM

## 2024-07-15 DIAGNOSIS — M25.522 PAIN IN LEFT ELBOW: ICD-10-CM

## 2024-07-15 PROCEDURE — 99203 OFFICE O/P NEW LOW 30 MIN: CPT | Performed by: ORTHOPAEDIC SURGERY

## 2024-07-15 NOTE — PROGRESS NOTES
I Chauncey Perea MD personally examined the patient and reviewed the history provided.  I agree with the note and the assessment and plan by .         Assessment  Diagnoses and all orders for this visit:    Olecranon bursitis of left elbow    Pain in left elbow        Discussion and Plan:  We discussed that the current recommendation is that we do not drain olecranon bursitis due to increased risk for infection and its propensity for self resolution without intervention.  These are very common and are generally self-limiting.  We recommend an elbow pad and compression to help alleviate this.  He can try heat, as well.  If he develops an infection as indicated by erythema or fevers, we would need to aspirate the olecranon bursa to test the fluid and send it for analysis in the lab for antibiotic decision-making.    PRN follow-up.    Subjective:   Patient ID: Gary G Moritz is a 63 y.o. male who presents to the office today for a new issue of left elbow pain.        He states that this has been ongoing for 4-6 weeks without any specific known inciting injury.  He also notes swelling over the elbow.  He denies associated numbness or tingling.  He denies feelings of fevers or chills.  No erythema or drainage.  He works as a  for [employer] and the elbow is uncomfortable when he is working.  He does have a history of left distal biceps tendon repair on 12/4/19.  He is doing very well from this.            The following portions of the patient's history were reviewed and updated as appropriate: allergies, current medications, past family history, past medical history, past social history, past surgical history and problem list.    Review of Systems   Constitutional:  Negative for chills and fever.   HENT:  Negative for ear pain and sore throat.    Eyes:  Negative for pain and visual disturbance.   Respiratory:  Negative for cough and shortness of breath.    Cardiovascular:  Negative for chest pain and  palpitations.   Gastrointestinal:  Negative for abdominal pain and vomiting.   Musculoskeletal:  Positive for arthralgias (left elbow discomfort).   Skin:  Negative for color change and rash.   All other systems reviewed and are negative.      Objective:  /77 (BP Location: Left arm, Patient Position: Sitting, Cuff Size: Adult)   Pulse 59   Ht 6' (1.829 m) Comment: verbal  Wt 109 kg (240 lb)   BMI 32.55 kg/m²       Left Elbow Exam     Tenderness   The patient is experiencing no tenderness.     Range of Motion   Extension:  normal   Flexion:  normal     Muscle Strength   The patient has normal left elbow strength.    Other   Erythema: absent  Sensation: normal  Pulse: present    Comments:  Fullness noted over the olecranon bursa            Physical Exam  Vitals and nursing note reviewed.   Constitutional:       Appearance: Normal appearance. He is well-developed.   HENT:      Head: Normocephalic and atraumatic.      Right Ear: External ear normal.      Left Ear: External ear normal.      Nose: Nose normal.   Eyes:      General: No scleral icterus.     Extraocular Movements: Extraocular movements intact.      Conjunctiva/sclera: Conjunctivae normal.   Cardiovascular:      Rate and Rhythm: Normal rate.   Pulmonary:      Effort: Pulmonary effort is normal. No respiratory distress.   Musculoskeletal:      Cervical back: Normal range of motion and neck supple.      Comments: See Ortho exam   Skin:     General: Skin is warm and dry.   Neurological:      General: No focal deficit present.      Mental Status: He is alert and oriented to person, place, and time.   Psychiatric:         Behavior: Behavior normal.           I have personally reviewed pertinent films in PACS and my interpretation is as follows.    Elbow X-rays 10/3/2019:  No acute osseous abnormality.        Scribe Attestation      I,:  Magnolia Pinon PA-C am acting as a scribe while in the presence of the attending physician.:       I,:  Chauncey  Zaheer Perea MD personally performed the services described in this documentation    as scribed in my presence.:

## 2024-09-17 ENCOUNTER — APPOINTMENT (OUTPATIENT)
Dept: LAB | Facility: CLINIC | Age: 64
End: 2024-09-17
Payer: COMMERCIAL

## 2024-09-17 DIAGNOSIS — Z11.4 SCREENING FOR HIV (HUMAN IMMUNODEFICIENCY VIRUS): ICD-10-CM

## 2024-09-17 DIAGNOSIS — Z11.59 NEED FOR HEPATITIS C SCREENING TEST: ICD-10-CM

## 2024-09-17 DIAGNOSIS — E79.0 HYPERURICEMIA: ICD-10-CM

## 2024-09-17 LAB — URATE SERPL-MCNC: 6.7 MG/DL (ref 3.5–8.5)

## 2024-09-17 PROCEDURE — 84550 ASSAY OF BLOOD/URIC ACID: CPT

## 2024-09-17 PROCEDURE — 36415 COLL VENOUS BLD VENIPUNCTURE: CPT

## 2024-09-26 ENCOUNTER — OFFICE VISIT (OUTPATIENT)
Dept: FAMILY MEDICINE CLINIC | Facility: CLINIC | Age: 64
End: 2024-09-26
Payer: COMMERCIAL

## 2024-09-26 VITALS
SYSTOLIC BLOOD PRESSURE: 110 MMHG | HEIGHT: 72 IN | HEART RATE: 71 BPM | DIASTOLIC BLOOD PRESSURE: 80 MMHG | OXYGEN SATURATION: 98 % | BODY MASS INDEX: 32.64 KG/M2 | WEIGHT: 241 LBS

## 2024-09-26 DIAGNOSIS — Z12.5 PROSTATE CANCER SCREENING: ICD-10-CM

## 2024-09-26 DIAGNOSIS — I10 PRIMARY HYPERTENSION: Primary | ICD-10-CM

## 2024-09-26 DIAGNOSIS — R60.0 BILATERAL LEG EDEMA: ICD-10-CM

## 2024-09-26 DIAGNOSIS — E79.0 HYPERURICEMIA: ICD-10-CM

## 2024-09-26 DIAGNOSIS — E78.2 MIXED HYPERLIPIDEMIA: ICD-10-CM

## 2024-09-26 DIAGNOSIS — R73.01 IMPAIRED FASTING GLUCOSE: ICD-10-CM

## 2024-09-26 PROCEDURE — 99214 OFFICE O/P EST MOD 30 MIN: CPT | Performed by: FAMILY MEDICINE

## 2024-09-26 NOTE — ASSESSMENT & PLAN NOTE
Patient was taken off of amlodipine and thiazide diuretic and started on losartan.  Tolerating losartan well.  No gouty flares.  No longer experiencing lower extremity edema.  Hypertension remains adequately controlled

## 2024-09-26 NOTE — PROGRESS NOTES
Ambulatory Visit  Name: Gary G Moritz      : 1960      MRN: [id number]  Encounter Provider: Malik Mullins DO  Encounter Date: 2024   Encounter department: [facility]    Assessment & Plan  Primary hypertension  Patient was taken off of amlodipine and thiazide diuretic and started on losartan.  Tolerating losartan well.  No gouty flares.  No longer experiencing lower extremity edema.  Hypertension remains adequately controlled         Impaired fasting glucose  Continue to watch dietary intake of carbohydrates.  Repeat hemoglobin A1c to be done in          Hyperuricemia  Remains on allopurinol 100 mg once daily.  No gouty flares.  Uric acid level at 6.7.  I had discontinued hydrochlorothiazide.  Patient will continue on allopurinol 100 mg once daily.  Repeat uric acid level to be done in          Bilateral leg edema  Lower extremity edema has completely resolved with discontinuation of calcium channel blocker         Prostate cancer screening  Screening PSA to be done in     Orders:    PSA, Total Screen; Future    Mixed hyperlipidemia       History of Present Illness     3-month follow-up in regards to hypertension.  Taken off of amlodipine and thiazide diuretic due to lower extremity edema as well as hyperuricemia.  No recent gouty flares.  He was started on losartan 50 mg once daily.  Was seen at orthopedic surgery office in July.  Tolerating medication very well.  No side effects.  Feels well.        History obtained from : patient  Review of Systems   Constitutional:  Positive for unexpected weight change (Weight is about the same).   HENT: Negative.     Eyes: Negative.    Respiratory: Negative.     Cardiovascular:  Negative for leg swelling (No lower extremity edema whatsoever).   Gastrointestinal: Negative.    Endocrine: Negative.    Genitourinary: Negative.    Musculoskeletal: Negative.    Skin: Negative.    Allergic/Immunologic: Negative.    Neurological:   Positive for numbness (Bilateral feet).   Hematological: Negative.    Psychiatric/Behavioral: Negative.     All other systems reviewed and are negative.    Medical History Reviewed by provider this encounter:  Problems       Current Outpatient Medications on File Prior to Visit   Medication Sig Dispense Refill    allopurinol (ZYLOPRIM) 100 mg tablet 100 mg daily      ALPRAZolam (XANAX) 0.25 mg tablet Take 1 tablet (0.25 mg total) by mouth 3 (three) times a day as needed for anxiety or sleep 90 tablet 1    b complex vitamins capsule Take 1 capsule by mouth daily.      cholecalciferol (VITAMIN D3) 1,000 units tablet Take 1,000 Units by mouth daily      clotrimazole-betamethasone (LOTRISONE) 1-0.05 % cream apply to affected area twice a day for 2 weeks AND SURROUNDING AR...  (REFER TO PRESCRIPTION NOTES).      gabapentin (NEURONTIN) 300 mg capsule Take 1 capsule (300 mg total) by mouth daily at bedtime 90 capsule 1    losartan (COZAAR) 50 mg tablet Take 1 tablet (50 mg total) by mouth daily 90 tablet 1    Multiple Vitamin (MULTIVITAMIN) capsule Take 1 capsule by mouth daily.      Omega-3 Fatty Acids (FISH OIL) 1200 MG CAPS Take 1 capsule by mouth daily      omeprazole (PriLOSEC) 20 mg delayed release capsule take 1 capsule by mouth once daily 90 capsule 1    rosuvastatin (CRESTOR) 10 MG tablet TAKE 1 TABLET BY MOUTH EVERY DAY 90 tablet 2     No current facility-administered medications on file prior to visit.          Objective     There were no vitals taken for this visit.    Physical Exam  Vitals and nursing note reviewed.   Constitutional:       General: He is not in acute distress.     Appearance: Normal appearance. He is obese. He is not ill-appearing.   Neck:      Vascular: No carotid bruit.   Cardiovascular:      Rate and Rhythm: Normal rate and regular rhythm.      Pulses: Normal pulses.      Heart sounds: Normal heart sounds.   Musculoskeletal:      Cervical back: Normal range of motion and neck supple.       Right lower leg: No edema.      Left lower leg: No edema.   Neurological:      Mental Status: He is alert.           Recent Results (from the past 1512 hour(s))   Uric acid    Collection Time: 09/17/24  6:23 AM   Result Value Ref Range    Uric Acid 6.7 3.5 - 8.5 mg/dL

## 2024-09-26 NOTE — ASSESSMENT & PLAN NOTE
Remains on allopurinol 100 mg once daily.  No gouty flares.  Uric acid level at 6.7.  I had discontinued hydrochlorothiazide.  Patient will continue on allopurinol 100 mg once daily.  Repeat uric acid level to be done in [place]

## 2024-09-26 NOTE — ASSESSMENT & PLAN NOTE
Lower extremity edema has completely resolved with discontinuation of calcium channel blocker

## 2024-09-26 NOTE — ASSESSMENT & PLAN NOTE
Continue to watch dietary intake of carbohydrates.  Repeat hemoglobin A1c to be done in June

## 2024-10-26 PROBLEM — Z12.5 PROSTATE CANCER SCREENING: Status: RESOLVED | Noted: 2024-04-01 | Resolved: 2024-10-26

## 2024-11-04 DIAGNOSIS — G57.93 NEUROPATHY OF BOTH FEET: ICD-10-CM

## 2024-11-05 RX ORDER — GABAPENTIN 300 MG/1
CAPSULE ORAL
Qty: 90 CAPSULE | Refills: 1 | Status: SHIPPED | OUTPATIENT
Start: 2024-11-05

## 2024-11-26 DIAGNOSIS — F41.9 ANXIETY: ICD-10-CM

## 2024-11-26 NOTE — TELEPHONE ENCOUNTER
Reason for call:   [x] Refill   [] Prior Auth  [] Other:     Office:   [x] PCP/Provider - Malik Mullins DO   [] Specialty/Provider -     Medication: ALPRAZolam (XANAX) 0.25 mg tablet     Dose/Frequency:     0.25 mg, Oral, 3 times daily PRN       Quantity: 90    Pharmacy: [facility] - [place], [place] - [address]     Does the patient have enough for 3 days?   [x] Yes   [] No - Send as HP to POD

## 2024-12-02 RX ORDER — ALPRAZOLAM 0.25 MG/1
0.25 TABLET ORAL 3 TIMES DAILY PRN
Qty: 90 TABLET | Refills: 0 | Status: SHIPPED | OUTPATIENT
Start: 2024-12-02

## 2025-02-11 DIAGNOSIS — F41.9 ANXIETY: ICD-10-CM

## 2025-02-12 RX ORDER — ALPRAZOLAM 0.25 MG/1
0.25 TABLET ORAL 3 TIMES DAILY PRN
Qty: 90 TABLET | Refills: 0 | Status: SHIPPED | OUTPATIENT
Start: 2025-02-12

## 2025-02-25 DIAGNOSIS — I10 PRIMARY HYPERTENSION: ICD-10-CM

## 2025-02-26 RX ORDER — LOSARTAN POTASSIUM 50 MG/1
50 TABLET ORAL DAILY
Qty: 90 TABLET | Refills: 1 | Status: SHIPPED | OUTPATIENT
Start: 2025-02-26

## 2025-05-13 NOTE — PROGRESS NOTES
Sourav was seen today for consult, anal fissure and rectal bleeding.    Diagnoses and all orders for this visit:    Anal pain       Anal pain  Patient presents for evaluation of anorectal pain.  Patient notes it started a few months ago.  He is overdue for colonoscopy for history of multiple tubulovillous adenomas.  Examination shows a perianal skin tear.  This appears to be more distal than I would expect for true anal fissure in the setting of chronic pruritus ani.  I will treat him with topical Calmoseptine.  He is overdue for colonoscopy we will schedule that for him today.  Risks and benefits of colonoscopy reviewed with patient to include, but not be limited to: anesthesia, bleeding, missed lesion and perforation requiring surgery.  Patient consents to procedure.      HPI    Gary G Moritz presents with concerns of anal fissure.     Last colonoscopy performed on 2/23/2024, with a 1 year recall.     Past Medical History:   Diagnosis Date    Acute URI 12/13/2023    Anxiety     Arthritis     Chronic GERD     Hypertension     Peripheral neuropathy      Past Surgical History:   Procedure Laterality Date    BACK SURGERY      lumbar 4-5    ELBOW SURGERY Right     Removal chips    JOINT REPLACEMENT Bilateral     Hip    LAMINECTOMY  2014    GA ARTHRP ACETBLR/PROX FEM PROSTC AGRFT/ALGRFT Right 01/12/2016    Procedure: ARTHROPLASTY HIP TOTAL ANTERIOR;  Surgeon: Malik Meyers DO;  Location: [facility];  Service: Orthopedics    GA ARTHRP KNE CONDYLE&PLATU MEDIAL&LAT COMPARTMENTS Left 12/12/2016    Procedure: ARTHROPLASTY KNEE TOTAL;  Surgeon: Antoine Pena MD;  Location: [facility];  Service: Orthopedics    GA ARTHRP KNE CONDYLE&PLATU MEDIAL&LAT COMPARTMENTS Right 06/01/2020    Procedure: ARTHROPLASTY KNEE TOTAL;  Surgeon: Antoine Pena MD;  Location: [facility];  Service: Orthopedics    GA RINSJ RPTD BICEPS/TRICEPS TDN DSTL W/WO TDN GRF Left 12/04/2019    Procedure: DISTAL BICEPS TENDON REPAIR;  Surgeon: Chauncey  MD Eliazar;  Location: [facility];  Service: Orthopedics       Current Outpatient Medications:     allopurinol (ZYLOPRIM) 100 mg tablet, 100 mg in the morning., Disp: , Rfl:     ALPRAZolam (XANAX) 0.25 mg tablet, Take 1 tablet (0.25 mg total) by mouth 3 (three) times a day as needed for anxiety or sleep, Disp: 90 tablet, Rfl: 0    b complex vitamins capsule, Take 1 capsule by mouth in the morning., Disp: , Rfl:     cholecalciferol (VITAMIN D3) 1,000 units tablet, Take 1,000 Units by mouth in the morning., Disp: , Rfl:     clotrimazole-betamethasone (LOTRISONE) 1-0.05 % cream, , Disp: , Rfl:     gabapentin (NEURONTIN) 300 mg capsule, Take 1 capsule (300 mg total) by mouth daily atbedtime, Disp: 90 capsule, Rfl: 1    losartan (COZAAR) 50 mg tablet, Take 1 tablet (50 mg total) by mouth daily, Disp: 90 tablet, Rfl: 1    Multiple Vitamin (MULTIVITAMIN) capsule, Take 1 capsule by mouth in the morning., Disp: , Rfl:     Omega-3 Fatty Acids (FISH OIL) 1200 MG CAPS, Take 1 capsule by mouth in the morning., Disp: , Rfl:     omeprazole (PriLOSEC) 20 mg delayed release capsule, take 1 capsule by mouth once daily, Disp: 90 capsule, Rfl: 1    rosuvastatin (CRESTOR) 10 MG tablet, TAKE 1 TABLET BY MOUTH EVERY DAY, Disp: 90 tablet, Rfl: 2  Allergies as of 05/15/2025 - Reviewed 05/15/2025   Allergen Reaction Noted    Oxycodone Itching 11/28/2016     Review of Systems  Vitals:    05/15/25 1348   BP: 136/74     Physical Exam  Constitutional:       Appearance: Normal appearance.   HENT:      Head: Normocephalic and atraumatic.      Mouth/Throat:      Mouth: Mucous membranes are dry.     Eyes:      Extraocular Movements: Extraocular movements intact.      Pupils: Pupils are equal, round, and reactive to light.     Abdominal:      General: Abdomen is flat.   Genitourinary:     Comments: Anterior off midline perianal skin tear.  This is external to the anal verge and off midline.    Skin:     General: Skin is warm.     Neurological:       General: No focal deficit present.      Mental Status: He is alert and oriented to person, place, and time.     Psychiatric:         Mood and Affect: Mood normal.         Behavior: Behavior normal.         Thought Content: Thought content normal.         Judgment: Judgment normal.

## 2025-05-15 ENCOUNTER — OFFICE VISIT (OUTPATIENT)
Age: 65
End: 2025-05-15
Payer: COMMERCIAL

## 2025-05-15 ENCOUNTER — PREP FOR PROCEDURE (OUTPATIENT)
Age: 65
End: 2025-05-15

## 2025-05-15 ENCOUNTER — TELEPHONE (OUTPATIENT)
Age: 65
End: 2025-05-15

## 2025-05-15 VITALS — SYSTOLIC BLOOD PRESSURE: 136 MMHG | DIASTOLIC BLOOD PRESSURE: 74 MMHG | BODY MASS INDEX: 33.31 KG/M2 | WEIGHT: 245.6 LBS

## 2025-05-15 DIAGNOSIS — K62.89 ANAL PAIN: Primary | ICD-10-CM

## 2025-05-15 DIAGNOSIS — K60.2 ANAL FISSURE: Primary | ICD-10-CM

## 2025-05-15 PROCEDURE — 99213 OFFICE O/P EST LOW 20 MIN: CPT | Performed by: COLON & RECTAL SURGERY

## 2025-05-15 NOTE — TELEPHONE ENCOUNTER
OV 5/15/2025 fissure, BMI 33    Last colon 2/2024 1 yr recall    Scheduled 6/17/2025 at [facility] w/DB    Paperwork handed to pt

## 2025-05-15 NOTE — LETTER
Gary G Moritz  [address] [facility] [address]        ------------------------------------------------------------------------------------------------------------    Colonoscopy Preparation: DULCOLAX & MIRALAX    [facility] - [address] [place] [address] - Aruna Bryant - Entrance A - 1st FL    Performing Physician: Dr. Velasco    DATE OF PROCEDURE: 6/17/2025    The OR/GI Lab will contact you the evening prior to your procedure with your exact arrival time.    We kindly ask that you immediately notify us of any need to cancel or reschedule your procedure.  _________________________________________________________________    WEEK BEFORE YOUR PROCEDURE:  Do not take Iron tablets for one week.  5 days prior to the appointment AVOID vegetables and fruits with skins or seeds, nuts, corn, popcorn, and whole grain breads.  Purchase: One (1) 238-gram container of Miralax (polyethylene glycol 3350), four (4) 5 mg Dulcolax (bisacodyl) tablets, and 64-ounces of Gatorade (sports drink) - no red, orange, or purple. These may be purchased at any pharmacy without a prescription. Generic products are permissible.  Arrange responsible transportation for day of the procedure.  DAY BEFORE THE PROCEDURE:  Clear liquids only for entire day prior. Nothing red, orange or purple.  You MAY have:  Soda  Water  Broth Gatorade  Jell-O  Popsicles Coffee/Tea without milk/creamer   YOU MAY NOT HAVE:  Solid Foods  Milk and milk products  Juice with pulp  BOWEL PREPARATION: Includes: One (1) 238-gram container of Miralax (polyethylene glycol 3350), four (4) 5 mg Dulcolax (bisacodyl) tablets, and 64-ounces of Gatorade (sports drink). Preparation may be refrigerated. Entire bowel prep should be completed.            Colonoscopy Preparation: DULCOLAX & MIRALAX    Afternoon before the procedure (2:00 pm - 5:00 pm):  Take two (2) 5 mg Dulcolax laxative tablets.    Evening before procedure (6:00 pm):  Mix entire container of Miralax  with 64-ounces of Gatorade and shake until all medication is dissolved.  Begin drinking solution. Drink an eight (8) ounce cup every 10-15 minutes until you have consumed half (32 ounces) of the solution. Refrigerate remaining solution.    Night before the procedure (8:00 pm):  Take two (2) 5 mg Dulcolax laxative tablets.    Beginning 5 hours before your procedure:  Drink the remaining amount of prepared solution (32 ounces). Drink an eight (8) ounce cup every 10-15 minutes until you have consumed the remaining solution.    Bowel prep should be completed 4 hours prior to your procedure time.    NOTHING TO EAT OR DRINK AFTER MIDNIGHT - EXCEPT YOUR BOWEL PREP    DAY OF PROCEDURE:  You may brush your teeth.  Leave all jewelry at home. Take out any facial piercings, if able.  Please arrive for your procedure as indicated by the OR / GI Lab / Endoscopy Unit. The hospital will contact you the day before with your exact arrival time.  Make sure you have arranged ahead of time for a responsible adult (18 or older) to accompany and drive you home after the procedure. Please discuss any transportation concerns with our staff prior to your procedure.  The effects of the anesthesia can persist for 24 hours. After receiving the sedation, you must exercise caution before engaging in any activity that could harm yourself and others (such as driving a car). Do not make any important decisions or do not drink any alcoholic beverages during this time period.        Colonoscopy Preparation: DULCOLAX & MIRALAX    After your procedure, you may have anything you'd like to eat or drink. You will probably want to start with something light. Please include plenty of fluids. Avoid items that cause gas such as sodas and salads.      NOTHING TO EAT OR DRINK (INCLUDING CHEWING GUM) AFTER 12 MIDNIGHT PRIOR TO YOUR PROCEDURE EXCEPT YOUR BOWEL PREP.    For any questions or concerns related to your bowel preparation or pre-procedure instructions,  please contact our office.  Thank you for choosing [facility] Gastroenterology or [facility] Colon & Rectal Surgery!                [phone] Gastroenterology  [phone] Colon & Rectal Surgery

## 2025-05-15 NOTE — ASSESSMENT & PLAN NOTE
Patient presents for evaluation of anorectal pain.  Patient notes it started a few months ago.  He is overdue for colonoscopy for history of multiple tubulovillous adenomas.  Examination shows a perianal skin tear.  This appears to be more distal than I would expect for true anal fissure in the setting of chronic pruritus ani.  I will treat him with topical Calmoseptine.  He is overdue for colonoscopy we will schedule that for him today.  Risks and benefits of colonoscopy reviewed with patient to include, but not be limited to: anesthesia, bleeding, missed lesion and perforation requiring surgery.  Patient consents to procedure.

## 2025-06-03 ENCOUNTER — APPOINTMENT (OUTPATIENT)
Dept: LAB | Facility: CLINIC | Age: 65
End: 2025-06-03
Attending: FAMILY MEDICINE
Payer: COMMERCIAL

## 2025-06-03 ENCOUNTER — TELEPHONE (OUTPATIENT)
Age: 65
End: 2025-06-03

## 2025-06-03 DIAGNOSIS — E78.2 MIXED HYPERLIPIDEMIA: ICD-10-CM

## 2025-06-03 DIAGNOSIS — E79.0 HYPERURICEMIA: ICD-10-CM

## 2025-06-03 DIAGNOSIS — I10 PRIMARY HYPERTENSION: ICD-10-CM

## 2025-06-03 DIAGNOSIS — Z12.5 PROSTATE CANCER SCREENING: ICD-10-CM

## 2025-06-03 DIAGNOSIS — R73.01 IMPAIRED FASTING GLUCOSE: ICD-10-CM

## 2025-06-03 LAB
ALBUMIN SERPL BCG-MCNC: 4.6 G/DL (ref 3.5–5)
ALP SERPL-CCNC: 48 U/L (ref 34–104)
ALT SERPL W P-5'-P-CCNC: 20 U/L (ref 7–52)
ANION GAP SERPL CALCULATED.3IONS-SCNC: 7 MMOL/L (ref 4–13)
AST SERPL W P-5'-P-CCNC: 16 U/L (ref 13–39)
BASOPHILS # BLD AUTO: 0.07 THOUSANDS/ÂΜL (ref 0–0.1)
BASOPHILS NFR BLD AUTO: 1 % (ref 0–1)
BILIRUB SERPL-MCNC: 0.9 MG/DL (ref 0.2–1)
BUN SERPL-MCNC: 13 MG/DL (ref 5–25)
CALCIUM SERPL-MCNC: 9.5 MG/DL (ref 8.4–10.2)
CHLORIDE SERPL-SCNC: 104 MMOL/L (ref 96–108)
CHOLEST SERPL-MCNC: 147 MG/DL (ref ?–200)
CO2 SERPL-SCNC: 29 MMOL/L (ref 21–32)
CREAT SERPL-MCNC: 0.75 MG/DL (ref 0.6–1.3)
EOSINOPHIL # BLD AUTO: 0.19 THOUSAND/ÂΜL (ref 0–0.61)
EOSINOPHIL NFR BLD AUTO: 3 % (ref 0–6)
ERYTHROCYTE [DISTWIDTH] IN BLOOD BY AUTOMATED COUNT: 13.2 % (ref 11.6–15.1)
EST. AVERAGE GLUCOSE BLD GHB EST-MCNC: 117 MG/DL
GFR SERPL CREATININE-BSD FRML MDRD: 96 ML/MIN/1.73SQ M
GLUCOSE P FAST SERPL-MCNC: 119 MG/DL (ref 65–99)
HBA1C MFR BLD: 5.7 %
HCT VFR BLD AUTO: 43.4 % (ref 36.5–49.3)
HDLC SERPL-MCNC: 50 MG/DL
HGB BLD-MCNC: 14.7 G/DL (ref 12–17)
IMM GRANULOCYTES # BLD AUTO: 0.03 THOUSAND/UL (ref 0–0.2)
IMM GRANULOCYTES NFR BLD AUTO: 1 % (ref 0–2)
LDLC SERPL CALC-MCNC: 72 MG/DL (ref 0–100)
LYMPHOCYTES # BLD AUTO: 1.45 THOUSANDS/ÂΜL (ref 0.6–4.47)
LYMPHOCYTES NFR BLD AUTO: 24 % (ref 14–44)
MCH RBC QN AUTO: 30.4 PG (ref 26.8–34.3)
MCHC RBC AUTO-ENTMCNC: 33.9 G/DL (ref 31.4–37.4)
MCV RBC AUTO: 90 FL (ref 82–98)
MONOCYTES # BLD AUTO: 0.68 THOUSAND/ÂΜL (ref 0.17–1.22)
MONOCYTES NFR BLD AUTO: 11 % (ref 4–12)
NEUTROPHILS # BLD AUTO: 3.63 THOUSANDS/ÂΜL (ref 1.85–7.62)
NEUTS SEG NFR BLD AUTO: 60 % (ref 43–75)
NONHDLC SERPL-MCNC: 97 MG/DL
NRBC BLD AUTO-RTO: 0 /100 WBCS
PLATELET # BLD AUTO: 237 THOUSANDS/UL (ref 149–390)
PMV BLD AUTO: 10.5 FL (ref 8.9–12.7)
POTASSIUM SERPL-SCNC: 3.8 MMOL/L (ref 3.5–5.3)
PROT SERPL-MCNC: 7.1 G/DL (ref 6.4–8.4)
PSA SERPL-MCNC: 0.36 NG/ML (ref 0–4)
RBC # BLD AUTO: 4.84 MILLION/UL (ref 3.88–5.62)
SODIUM SERPL-SCNC: 140 MMOL/L (ref 135–147)
TRIGL SERPL-MCNC: 123 MG/DL (ref ?–150)
TSH SERPL DL<=0.05 MIU/L-ACNC: 3.87 UIU/ML (ref 0.45–4.5)
URATE SERPL-MCNC: 5.8 MG/DL (ref 3.5–8.5)
WBC # BLD AUTO: 6.05 THOUSAND/UL (ref 4.31–10.16)

## 2025-06-03 PROCEDURE — 80061 LIPID PANEL: CPT

## 2025-06-03 PROCEDURE — G0103 PSA SCREENING: HCPCS

## 2025-06-03 PROCEDURE — 84443 ASSAY THYROID STIM HORMONE: CPT

## 2025-06-03 PROCEDURE — 83036 HEMOGLOBIN GLYCOSYLATED A1C: CPT

## 2025-06-03 PROCEDURE — 84550 ASSAY OF BLOOD/URIC ACID: CPT

## 2025-06-03 PROCEDURE — 85025 COMPLETE CBC W/AUTO DIFF WBC: CPT

## 2025-06-03 PROCEDURE — 80053 COMPREHEN METABOLIC PANEL: CPT

## 2025-06-03 PROCEDURE — 36415 COLL VENOUS BLD VENIPUNCTURE: CPT

## 2025-06-03 NOTE — TELEPHONE ENCOUNTER
Patient called in stated had blood work done and would like to know results, Not reviewed yet, once reviewed patient would like a call back. Thank you.

## 2025-06-06 ENCOUNTER — RESULTS FOLLOW-UP (OUTPATIENT)
Dept: FAMILY MEDICINE CLINIC | Facility: CLINIC | Age: 65
End: 2025-06-06

## 2025-06-09 ENCOUNTER — OFFICE VISIT (OUTPATIENT)
Dept: FAMILY MEDICINE CLINIC | Facility: CLINIC | Age: 65
End: 2025-06-09
Payer: COMMERCIAL

## 2025-06-09 VITALS
OXYGEN SATURATION: 98 % | HEART RATE: 82 BPM | WEIGHT: 245 LBS | BODY MASS INDEX: 33.18 KG/M2 | RESPIRATION RATE: 16 BRPM | SYSTOLIC BLOOD PRESSURE: 124 MMHG | HEIGHT: 72 IN | DIASTOLIC BLOOD PRESSURE: 68 MMHG

## 2025-06-09 DIAGNOSIS — Z00.00 ANNUAL PHYSICAL EXAM: Primary | ICD-10-CM

## 2025-06-09 DIAGNOSIS — E79.0 HYPERURICEMIA: ICD-10-CM

## 2025-06-09 DIAGNOSIS — E66.9 OBESITY (BMI 30-39.9): ICD-10-CM

## 2025-06-09 DIAGNOSIS — I10 PRIMARY HYPERTENSION: ICD-10-CM

## 2025-06-09 DIAGNOSIS — E66.811 CLASS 1 OBESITY WITHOUT SERIOUS COMORBIDITY WITH BODY MASS INDEX (BMI) OF 30.0 TO 30.9 IN ADULT, UNSPECIFIED OBESITY TYPE: ICD-10-CM

## 2025-06-09 DIAGNOSIS — R73.01 IMPAIRED FASTING GLUCOSE: ICD-10-CM

## 2025-06-09 DIAGNOSIS — E78.2 MIXED HYPERLIPIDEMIA: ICD-10-CM

## 2025-06-09 DIAGNOSIS — G57.93 NEUROPATHY OF BOTH FEET: ICD-10-CM

## 2025-06-09 DIAGNOSIS — Z12.5 PROSTATE CANCER SCREENING: ICD-10-CM

## 2025-06-09 PROBLEM — R60.0 BILATERAL LEG EDEMA: Status: RESOLVED | Noted: 2024-04-01 | Resolved: 2025-06-09

## 2025-06-09 PROCEDURE — 99213 OFFICE O/P EST LOW 20 MIN: CPT | Performed by: FAMILY MEDICINE

## 2025-06-09 PROCEDURE — 99396 PREV VISIT EST AGE 40-64: CPT | Performed by: FAMILY MEDICINE

## 2025-06-09 RX ORDER — TIRZEPATIDE 2.5 MG/.5ML
2.5 INJECTION, SOLUTION SUBCUTANEOUS WEEKLY
Qty: 2 ML | Refills: 0 | Status: SHIPPED | OUTPATIENT
Start: 2025-06-09 | End: 2025-07-07

## 2025-06-09 RX ORDER — TIRZEPATIDE 10 MG/.5ML
10 INJECTION, SOLUTION SUBCUTANEOUS WEEKLY
Qty: 2 ML | Refills: 0 | Status: SHIPPED | OUTPATIENT
Start: 2025-09-09

## 2025-06-09 RX ORDER — GABAPENTIN 300 MG/1
300 CAPSULE ORAL
Qty: 90 CAPSULE | Refills: 1 | Status: SHIPPED | OUTPATIENT
Start: 2025-06-09

## 2025-06-09 RX ORDER — ALLOPURINOL 100 MG/1
100 TABLET ORAL DAILY
Qty: 90 TABLET | Refills: 1 | Status: SHIPPED | OUTPATIENT
Start: 2025-06-09

## 2025-06-09 RX ORDER — TIRZEPATIDE 5 MG/.5ML
5 INJECTION, SOLUTION SUBCUTANEOUS WEEKLY
Qty: 2 ML | Refills: 0 | Status: SHIPPED | OUTPATIENT
Start: 2025-07-09

## 2025-06-09 RX ORDER — TIRZEPATIDE 7.5 MG/.5ML
7.5 INJECTION, SOLUTION SUBCUTANEOUS WEEKLY
Qty: 2 ML | Refills: 0 | Status: SHIPPED | OUTPATIENT
Start: 2025-08-09

## 2025-06-09 NOTE — ASSESSMENT & PLAN NOTE
Patient remains on allopurinol 100 mg daily.  No recent gouty flares.  Uric acid level is actually improved since being off hydrochlorothiazide.  Repeat uric acid level in 1 year.  I did place prescription for refill and will assume management

## 2025-06-09 NOTE — PROGRESS NOTES
Subjective:      Patient ID: Gary G Moritz is a 64 y.o. male.    64-year-old male presents for annual physical examination and follow-up of chronic conditions.  Overall the patient feels well.  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  I did take him off of hydrochlorothiazide.  No significant lower extremity edema and his uric acid level improved even further.  No recent gouty flares.  Patient is interested in GLP-1 to assist with weight loss.  He and his wife have been watching her diet.  He tries to get in as much exercise as he can with his work schedule        Past Medical History[1]    Family History[2]    Past Surgical History[3]     reports that he quit smoking about 5 years ago. His smoking use included cigarettes. He has never used smokeless tobacco. He reports current alcohol use of about 3.0 standard drinks of alcohol per week. He reports that he does not currently use drugs after having used the following drugs: Marijuana.    Current Medications[4]    The following portions of the patient's history were reviewed and updated as appropriate: allergies, current medications, past family history, past medical history, past social history, past surgical history and problem list.    Review of Systems   Constitutional:  Positive for unexpected weight change (Weight is about the same).   HENT: Negative.     Eyes: Negative.    Respiratory: Negative.     Cardiovascular:  Negative for leg swelling (No lower extremity edema whatsoever).   Gastrointestinal: Negative.    Endocrine: Negative.    Genitourinary: Negative.    Musculoskeletal: Negative.    Skin: Negative.    Allergic/Immunologic: Negative.    Neurological:  Positive for numbness (Bilateral feet).   Hematological: Negative.    Psychiatric/Behavioral: Negative.     All other systems reviewed and are negative.          Objective:    /68   Pulse 82   Resp 16   Ht 6' (1.829 m)   Wt 111 kg (245 lb)   SpO2 98%   BMI  33.23 kg/m²      Physical Exam  Vitals and nursing note reviewed.   Constitutional:       General: He is not in acute distress.     Appearance: Normal appearance. He is well-developed. He is obese. He is not ill-appearing.   HENT:      Head: Normocephalic and atraumatic.      Right Ear: Tympanic membrane, ear canal and external ear normal.      Left Ear: Tympanic membrane, ear canal and external ear normal.      Nose: Nose normal.      Mouth/Throat:      Mouth: Mucous membranes are moist.     Eyes:      Extraocular Movements: Extraocular movements intact.      Conjunctiva/sclera: Conjunctivae normal.      Pupils: Pupils are equal, round, and reactive to light.       Cardiovascular:      Rate and Rhythm: Normal rate and regular rhythm.      Pulses: Normal pulses.      Heart sounds: Normal heart sounds. No murmur heard.  Pulmonary:      Effort: Pulmonary effort is normal.      Breath sounds: Normal breath sounds.   Abdominal:      General: Abdomen is flat. Bowel sounds are normal.      Palpations: Abdomen is soft.     Musculoskeletal:         General: Normal range of motion.      Cervical back: Normal range of motion and neck supple.     Skin:     General: Skin is warm and dry.     Neurological:      General: No focal deficit present.      Mental Status: He is alert and oriented to person, place, and time.     Psychiatric:         Mood and Affect: Mood normal.         Behavior: Behavior normal.         Thought Content: Thought content normal.         Judgment: Judgment normal.           Recent Results (from the past 6 weeks)   CBC and differential    Collection Time: 06/03/25  6:04 AM   Result Value Ref Range    WBC 6.05 4.31 - 10.16 Thousand/uL    RBC 4.84 3.88 - 5.62 Million/uL    Hemoglobin 14.7 12.0 - 17.0 g/dL    Hematocrit 43.4 36.5 - 49.3 %    MCV 90 82 - 98 fL    MCH 30.4 26.8 - 34.3 pg    MCHC 33.9 31.4 - 37.4 g/dL    RDW 13.2 11.6 - 15.1 %    MPV 10.5 8.9 - 12.7 fL    Platelets 237 149 - 390 Thousands/uL     nRBC 0 /100 WBCs    Segmented % 60 43 - 75 %    Immature Grans % 1 0 - 2 %    Lymphocytes % 24 14 - 44 %    Monocytes % 11 4 - 12 %    Eosinophils Relative 3 0 - 6 %    Basophils Relative 1 0 - 1 %    Absolute Neutrophils 3.63 1.85 - 7.62 Thousands/µL    Absolute Immature Grans 0.03 0.00 - 0.20 Thousand/uL    Absolute Lymphocytes 1.45 0.60 - 4.47 Thousands/µL    Absolute Monocytes 0.68 0.17 - 1.22 Thousand/µL    Eosinophils Absolute 0.19 0.00 - 0.61 Thousand/µL    Basophils Absolute 0.07 0.00 - 0.10 Thousands/µL   Comprehensive metabolic panel    Collection Time: 06/03/25  6:04 AM   Result Value Ref Range    Sodium 140 135 - 147 mmol/L    Potassium 3.8 3.5 - 5.3 mmol/L    Chloride 104 96 - 108 mmol/L    CO2 29 21 - 32 mmol/L    ANION GAP 7 4 - 13 mmol/L    BUN 13 5 - 25 mg/dL    Creatinine 0.75 0.60 - 1.30 mg/dL    Glucose, Fasting 119 (H) 65 - 99 mg/dL    Calcium 9.5 8.4 - 10.2 mg/dL    AST 16 13 - 39 U/L    ALT 20 7 - 52 U/L    Alkaline Phosphatase 48 34 - 104 U/L    Total Protein 7.1 6.4 - 8.4 g/dL    Albumin 4.6 3.5 - 5.0 g/dL    Total Bilirubin 0.90 0.20 - 1.00 mg/dL    eGFR 96 ml/min/1.73sq m   Hemoglobin A1C    Collection Time: 06/03/25  6:04 AM   Result Value Ref Range    Hemoglobin A1C 5.7 (H) Normal 4.0-5.6%; PreDiabetic 5.7-6.4%; Diabetic >=6.5%; Glycemic control for adults with diabetes <7.0% %     mg/dl   Lipid panel    Collection Time: 06/03/25  6:04 AM   Result Value Ref Range    Cholesterol 147 See Comment mg/dL    Triglycerides 123 See Comment mg/dL    HDL, Direct 50 >=40 mg/dL    LDL Calculated 72 0 - 100 mg/dL    Non-HDL-Chol (CHOL-HDL) 97 mg/dl   TSH, 3rd generation with Free T4 reflex    Collection Time: 06/03/25  6:04 AM   Result Value Ref Range    TSH 3RD GENERATON 3.865 0.450 - 4.500 uIU/mL   Uric acid    Collection Time: 06/03/25  6:04 AM   Result Value Ref Range    Uric Acid 5.8 3.5 - 8.5 mg/dL   PSA, Total Screen    Collection Time: 06/03/25  6:04 AM   Result Value Ref Range    PSA  0.362 0.000 - 4.000 ng/mL       Assessment/Plan:    Obesity (BMI 30-39.9)  Prior Authorization Clinical Questions for Weight Management Pharmacotherapy    1. Does the patient have a contrainidcation to medication prescribed for weight management?: No  2. Does the patient have a diagnosis of obesity, confirmed by a BMI greater than or equal to 30 kg/m^2?: Yes  3. Does the patient have a BMI of greater than or equal to 27 kg/m^2 with at least one weight-related comorbidity/risk factor/complication (e.g. diabetes, dyslipidemia, coronary artery disease)?: Yes  4. Weight-related co-morbidities/risk factors: prediabetes  7. Has the patient been on a weight loss regimen of low-calorie diet, increased physical activity, and lifestyle modifications for a minimum of 6 months?: Yes  8. Has the patient completed a comprehensive weight loss program (ie, Weight Watchers, Noom, Bariatrics, other virgil on phone)? If so, what?: No  9. Does the patient have a history of type 2 diabetes?: No  10. Has the member tried and failed other weight loss medication within the past 12 months?: No  11. Will the member use requested medication in combination with another GLP agonist or weight loss drug?: No  12. Is the medication a controlled substance?: No     Baseline weight (in pounds): 245 lbs  Current weight (in pounds): 245 lbs  Weight loss percentage: 0%       Patient had previously tried to lose weight through dietary modification and exercise and has been somewhat unsuccessful.  Patient is interested in GLP-1.  Gradual titration of Zepbound sent to pharmacy.  Will need to  gradual titration every month.  Explained side effects to the patient.  If he remains on GLP-1 agonist for over 6 months then we will need hemoglobin A1c    - Target weight would be 200 pounds    Primary hypertension  Hypertension remains very well-controlled on losartan 50 mg once daily.  Continue same.  Since being taken off of amlodipine he has had no lower  extremity edema and is no longer on thiazide diuretic.    - Reevaluate hypertension in 1 year    Impaired fasting glucose  Hemoglobin A1c at 5.7%.  Continue to watch dietary intake of carbohydrates.  I will be placing him on GLP-1 for obesity which actually should bring down his fasting glucose as well.  Repeat A1c will need to be done in 6 months if he remains on GLP-1 agonist    Neuropathy of both feet  Patient remains on gabapentin 300 mg at bedtime.  Doing well at this dosage.    Annual physical exam  Annual physical examination performed    - Patient is present on screenings    - I did make recommendation for shingles vaccination.  He and his wife have discussed that.  When he decides to proceed with that he would receive  that series here in the office    Hyperuricemia  Patient remains on allopurinol 100 mg daily.  No recent gouty flares.  Uric acid level is actually improved since being off hydrochlorothiazide.  Repeat uric acid level in 1 year.  I did place prescription for refill and will assume management          Problem List Items Addressed This Visit        Cardiovascular and Mediastinum    Primary hypertension    Hypertension remains very well-controlled on losartan 50 mg once daily.  Continue same.  Since being taken off of amlodipine he has had no lower extremity edema and is no longer on thiazide diuretic.    - Reevaluate hypertension in 1 year         Relevant Orders    CBC and differential       Endocrine    Impaired fasting glucose    Hemoglobin A1c at 5.7%.  Continue to watch dietary intake of carbohydrates.  I will be placing him on GLP-1 for obesity which actually should bring down his fasting glucose as well.  Repeat A1c will need to be done in 6 months if he remains on GLP-1 agonist         Relevant Orders    Hemoglobin A1C       Nervous and Auditory    Neuropathy of both feet    Patient remains on gabapentin 300 mg at bedtime.  Doing well at this dosage.         Relevant Medications     gabapentin (NEURONTIN) 300 mg capsule       Other    Annual physical exam - Primary    Annual physical examination performed    - Patient is present on screenings    - I did make recommendation for shingles vaccination.  He and his wife have discussed that.  When he decides to proceed with that he would receive  that series here in the office         Hyperuricemia    Patient remains on allopurinol 100 mg daily.  No recent gouty flares.  Uric acid level is actually improved since being off hydrochlorothiazide.  Repeat uric acid level in 1 year.  I did place prescription for refill and will assume management         Relevant Medications    allopurinol (ZYLOPRIM) 100 mg tablet    Other Relevant Orders    Uric acid    Obesity (BMI 30-39.9)    Prior Authorization Clinical Questions for Weight Management Pharmacotherapy    1. Does the patient have a contrainidcation to medication prescribed for weight management?: No  2. Does the patient have a diagnosis of obesity, confirmed by a BMI greater than or equal to 30 kg/m^2?: Yes  3. Does the patient have a BMI of greater than or equal to 27 kg/m^2 with at least one weight-related comorbidity/risk factor/complication (e.g. diabetes, dyslipidemia, coronary artery disease)?: Yes  4. Weight-related co-morbidities/risk factors: prediabetes  7. Has the patient been on a weight loss regimen of low-calorie diet, increased physical activity, and lifestyle modifications for a minimum of 6 months?: Yes  8. Has the patient completed a comprehensive weight loss program (ie, Weight Watchers, Noom, Bariatrics, other virgil on phone)? If so, what?: No  9. Does the patient have a history of type 2 diabetes?: No  10. Has the member tried and failed other weight loss medication within the past 12 months?: No  11. Will the member use requested medication in combination with another GLP agonist or weight loss drug?: No  12. Is the medication a controlled substance?: No     Baseline weight (in  pounds): 245 lbs  Current weight (in pounds): 245 lbs  Weight loss percentage: 0%       Patient had previously tried to lose weight through dietary modification and exercise and has been somewhat unsuccessful.  Patient is interested in GLP-1.  Gradual titration of Zepbound sent to pharmacy.  Will need to  gradual titration every month.  Explained side effects to the patient.  If he remains on GLP-1 agonist for over 6 months then we will need hemoglobin A1c    - Target weight would be 200 pounds         Relevant Medications    tirzepatide (Zepbound) 5 mg/0.5 mL auto-injector (Start on 7/9/2025)    tirzepatide (Zepbound) 7.5 mg/0.5 mL auto-injector (Start on 8/9/2025)    tirzepatide (Zepbound) 10 mg/0.5 mL auto-injector (Start on 9/9/2025)   Other Visit Diagnoses       Class 1 obesity without serious comorbidity with body mass index (BMI) of 30.0 to 30.9 in adult, unspecified obesity type        Relevant Medications    tirzepatide (Zepbound) 2.5 mg/0.5 mL auto-injector    Other Relevant Orders    TSH, 3rd generation with Free T4 reflex      Prostate cancer screening        Relevant Orders    PSA, Total Screen      Mixed hyperlipidemia        Relevant Orders    Comprehensive metabolic panel    Lipid panel                 [1]  Past Medical History:  Diagnosis Date   • Acute URI 12/13/2023   • Anxiety    • Arthritis    • Bilateral leg edema 04/01/2024   • Chronic GERD    • Hypertension    • Peripheral neuropathy    [2]  Family History  Problem Relation Name Age of Onset   • Hypertension Mother     [3]  Past Surgical History:  Procedure Laterality Date   • BACK SURGERY      lumbar 4-5   • ELBOW SURGERY Right     Removal chips   • JOINT REPLACEMENT Bilateral     Hip   • LAMINECTOMY  2014   • ID ARTHRP ACETBLR/PROX FEM PROSTC AGRFT/ALGRFT Right 01/12/2016    Procedure: ARTHROPLASTY HIP TOTAL ANTERIOR;  Surgeon: Malik Meyers DO;  Location: [facility];  Service: Orthopedics   • ID ARTHRP KNE CONDYLE&PLATU  MEDIAL&LAT COMPARTMENTS Left 12/12/2016    Procedure: ARTHROPLASTY KNEE TOTAL;  Surgeon: Antoine Pena MD;  Location: [facility];  Service: Orthopedics   • LA ARTHRP KNE CONDYLE&PLATU MEDIAL&LAT COMPARTMENTS Right 06/01/2020    Procedure: ARTHROPLASTY KNEE TOTAL;  Surgeon: Antoine Pena MD;  Location: [facility];  Service: Orthopedics   • LA RINSJ RPTD BICEPS/TRICEPS TDN DSTL W/WO TDN GRF Left 12/04/2019    Procedure: DISTAL BICEPS TENDON REPAIR;  Surgeon: Chauncey Perea MD;  Location: [facility];  Service: Orthopedics   [4]    Current Outpatient Medications:   •  allopurinol (ZYLOPRIM) 100 mg tablet, Take 1 tablet (100 mg total) by mouth in the morning., Disp: 90 tablet, Rfl: 1  •  ALPRAZolam (XANAX) 0.25 mg tablet, Take 1 tablet (0.25 mg total) by mouth 3 (three) times a day as needed for anxiety or sleep, Disp: 90 tablet, Rfl: 0  •  b complex vitamins capsule, Take 1 capsule by mouth in the morning., Disp: , Rfl:   •  cholecalciferol (VITAMIN D3) 1,000 units tablet, Take 1,000 Units by mouth in the morning., Disp: , Rfl:   •  clotrimazole-betamethasone (LOTRISONE) 1-0.05 % cream, , Disp: , Rfl:   •  gabapentin (NEURONTIN) 300 mg capsule, Take 1 capsule (300 mg total) by mouth daily at bedtime, Disp: 90 capsule, Rfl: 1  •  losartan (COZAAR) 50 mg tablet, Take 1 tablet (50 mg total) by mouth daily, Disp: 90 tablet, Rfl: 1  •  Multiple Vitamin (MULTIVITAMIN) capsule, Take 1 capsule by mouth in the morning., Disp: , Rfl:   •  Omega-3 Fatty Acids (FISH OIL) 1200 MG CAPS, Take 1 capsule by mouth in the morning., Disp: , Rfl:   •  omeprazole (PriLOSEC) 20 mg delayed release capsule, take 1 capsule by mouth once daily, Disp: 90 capsule, Rfl: 1  •  rosuvastatin (CRESTOR) 10 MG tablet, TAKE 1 TABLET BY MOUTH EVERY DAY, Disp: 90 tablet, Rfl: 2  •  [START ON 9/9/2025] tirzepatide (Zepbound) 10 mg/0.5 mL auto-injector, Inject 0.5 mL (10 mg total) under the skin once a week Do not start before September 9, 2025.,  Disp: 2 mL, Rfl: 0  •  tirzepatide (Zepbound) 2.5 mg/0.5 mL auto-injector, Inject 0.5 mL (2.5 mg total) under the skin once a week for 28 days, Disp: 2 mL, Rfl: 0  •  [START ON 7/9/2025] tirzepatide (Zepbound) 5 mg/0.5 mL auto-injector, Inject 0.5 mL (5 mg total) under the skin once a week Do not start before July 9, 2025., Disp: 2 mL, Rfl: 0  •  [START ON 8/9/2025] tirzepatide (Zepbound) 7.5 mg/0.5 mL auto-injector, Inject 0.5 mL (7.5 mg total) under the skin once a week Do not start before August 9, 2025., Disp: 2 mL, Rfl: 0

## 2025-06-09 NOTE — ASSESSMENT & PLAN NOTE
Patient remains on gabapentin 300 mg at bedtime.  Doing well at this dosage.   oriented to person, place and time

## 2025-06-09 NOTE — ASSESSMENT & PLAN NOTE
Hemoglobin A1c at 5.7%.  Continue to watch dietary intake of carbohydrates.  I will be placing him on GLP-1 for obesity which actually should bring down his fasting glucose as well.  Repeat A1c will need to be done in 6 months if he remains on GLP-1 agonist

## 2025-06-09 NOTE — ASSESSMENT & PLAN NOTE
Annual physical examination performed    - Patient is present on screenings    - I did make recommendation for shingles vaccination.  He and his wife have discussed that.  When he decides to proceed with that he would receive  that series here in the office

## 2025-06-09 NOTE — ASSESSMENT & PLAN NOTE
Prior Authorization Clinical Questions for Weight Management Pharmacotherapy    1. Does the patient have a contrainidcation to medication prescribed for weight management?: No  2. Does the patient have a diagnosis of obesity, confirmed by a BMI greater than or equal to 30 kg/m^2?: Yes  3. Does the patient have a BMI of greater than or equal to 27 kg/m^2 with at least one weight-related comorbidity/risk factor/complication (e.g. diabetes, dyslipidemia, coronary artery disease)?: Yes  4. Weight-related co-morbidities/risk factors: prediabetes  7. Has the patient been on a weight loss regimen of low-calorie diet, increased physical activity, and lifestyle modifications for a minimum of 6 months?: Yes  8. Has the patient completed a comprehensive weight loss program (ie, Weight Watchers, Noom, Bariatrics, other virgil on phone)? If so, what?: No  9. Does the patient have a history of type 2 diabetes?: No  10. Has the member tried and failed other weight loss medication within the past 12 months?: No  11. Will the member use requested medication in combination with another GLP agonist or weight loss drug?: No  12. Is the medication a controlled substance?: No     Baseline weight (in pounds): 245 lbs  Current weight (in pounds): 245 lbs  Weight loss percentage: 0%       Patient had previously tried to lose weight through dietary modification and exercise and has been somewhat unsuccessful.  Patient is interested in GLP-1.  Gradual titration of Zepbound sent to pharmacy.  Will need to  gradual titration every month.  Explained side effects to the patient.  If he remains on GLP-1 agonist for over 6 months then we will need hemoglobin A1c    - Target weight would be 200 pounds

## 2025-06-09 NOTE — ASSESSMENT & PLAN NOTE
Hypertension remains very well-controlled on losartan 50 mg once daily.  Continue same.  Since being taken off of amlodipine he has had no lower extremity edema and is no longer on thiazide diuretic.    - Reevaluate hypertension in 1 year

## 2025-06-10 ENCOUNTER — TELEPHONE (OUTPATIENT)
Dept: FAMILY MEDICINE CLINIC | Facility: CLINIC | Age: 65
End: 2025-06-10

## 2025-06-10 NOTE — TELEPHONE ENCOUNTER
"PA has been started for the patient by the pharmacy for Zepbound 2.5mg/0.5ml Auto-injectors     Login to [email or web address] and click \"enter a key\".    Enter the patient's last name, date of birth and the key.    Key: [id number]    Complete the PA and click \"send to plan\" for approval.      PA request scanned in.  "

## 2025-06-10 NOTE — TELEPHONE ENCOUNTER
PA for  (Zepbound) 5 mg/0.5 mLSUBMITTED to [employer]     via      [x]S[employer]s-Case ID # [id number]     [x]PA sent as URGENT    All office notes, labs and other pertaining documents and studies sent. Clinical questions answered. Awaiting determination from insurance company.     Turnaround time for your insurance to make a decision on your Prior Authorization can take 7-21 business days.

## 2025-06-12 NOTE — TELEPHONE ENCOUNTER
PA for  (Zepbound) 5 mg/0.5 mL APPROVED     Date(s) approved Светлана 10, 2025 to Светлана 10, 2026     Case #[id number]     Patient advised by          [x]MyChart Message  []Phone call   [x]LMOM  []L/M to call office as no active Communication consent on file  []Unable to leave detailed message as VM not approved on Communication consent       Pharmacy advised by    [x]Fax  []Phone call  []Secure Chat    Specialty Pharmacy    []     Approval letter scanned into [employer] Yes

## 2025-06-17 ENCOUNTER — ANESTHESIA EVENT (OUTPATIENT)
Dept: GASTROENTEROLOGY | Facility: HOSPITAL | Age: 65
End: 2025-06-17
Payer: COMMERCIAL

## 2025-06-17 ENCOUNTER — ANESTHESIA (OUTPATIENT)
Dept: GASTROENTEROLOGY | Facility: HOSPITAL | Age: 65
End: 2025-06-17
Payer: COMMERCIAL

## 2025-06-17 ENCOUNTER — HOSPITAL ENCOUNTER (OUTPATIENT)
Dept: GASTROENTEROLOGY | Facility: HOSPITAL | Age: 65
Setting detail: OUTPATIENT SURGERY
Discharge: HOME | End: 2025-06-17
Attending: COLON & RECTAL SURGERY
Payer: COMMERCIAL

## 2025-06-17 VITALS
HEIGHT: 72 IN | HEART RATE: 82 BPM | SYSTOLIC BLOOD PRESSURE: 138 MMHG | WEIGHT: 245 LBS | RESPIRATION RATE: 16 BRPM | BODY MASS INDEX: 33.18 KG/M2 | DIASTOLIC BLOOD PRESSURE: 69 MMHG | OXYGEN SATURATION: 96 % | TEMPERATURE: 97.7 F

## 2025-06-17 DIAGNOSIS — K60.2 ANAL FISSURE: ICD-10-CM

## 2025-06-17 DIAGNOSIS — K62.89 ANAL PAIN: Primary | ICD-10-CM

## 2025-06-17 PROCEDURE — 88305 TISSUE EXAM BY PATHOLOGIST: CPT | Performed by: STUDENT IN AN ORGANIZED HEALTH CARE EDUCATION/TRAINING PROGRAM

## 2025-06-17 PROCEDURE — 45380 COLONOSCOPY AND BIOPSY: CPT | Performed by: COLON & RECTAL SURGERY

## 2025-06-17 RX ORDER — PROPOFOL 10 MG/ML
INJECTION, EMULSION INTRAVENOUS AS NEEDED
Status: DISCONTINUED | OUTPATIENT
Start: 2025-06-17 | End: 2025-06-17

## 2025-06-17 RX ORDER — FLUOCINONIDE 0.5 MG/G
OINTMENT TOPICAL 2 TIMES DAILY
Qty: 60 G | Refills: 0 | Status: SHIPPED | OUTPATIENT
Start: 2025-06-17

## 2025-06-17 RX ORDER — LIDOCAINE HYDROCHLORIDE 10 MG/ML
0.5 INJECTION, SOLUTION EPIDURAL; INFILTRATION; INTRACAUDAL; PERINEURAL ONCE AS NEEDED
Status: CANCELLED | OUTPATIENT
Start: 2025-06-17

## 2025-06-17 RX ORDER — SODIUM CHLORIDE 9 MG/ML
INJECTION, SOLUTION INTRAVENOUS CONTINUOUS PRN
Status: DISCONTINUED | OUTPATIENT
Start: 2025-06-17 | End: 2025-06-17

## 2025-06-17 RX ORDER — LIDOCAINE HYDROCHLORIDE 10 MG/ML
INJECTION, SOLUTION EPIDURAL; INFILTRATION; INTRACAUDAL; PERINEURAL AS NEEDED
Status: DISCONTINUED | OUTPATIENT
Start: 2025-06-17 | End: 2025-06-17

## 2025-06-17 RX ORDER — SODIUM CHLORIDE 9 MG/ML
25 INJECTION, SOLUTION INTRAVENOUS CONTINUOUS
Status: CANCELLED | OUTPATIENT
Start: 2025-06-17

## 2025-06-17 RX ADMIN — PROPOFOL 40 MG: 10 INJECTION, EMULSION INTRAVENOUS at 10:25

## 2025-06-17 RX ADMIN — Medication 40 MG: at 10:20

## 2025-06-17 RX ADMIN — PROPOFOL 50 MG: 10 INJECTION, EMULSION INTRAVENOUS at 10:20

## 2025-06-17 RX ADMIN — PROPOFOL 50 MG: 10 INJECTION, EMULSION INTRAVENOUS at 10:23

## 2025-06-17 RX ADMIN — PROPOFOL 50 MG: 10 INJECTION, EMULSION INTRAVENOUS at 10:29

## 2025-06-17 RX ADMIN — PROPOFOL 40 MG: 10 INJECTION, EMULSION INTRAVENOUS at 10:27

## 2025-06-17 RX ADMIN — PROPOFOL 170 MG: 10 INJECTION, EMULSION INTRAVENOUS at 10:17

## 2025-06-17 RX ADMIN — PROPOFOL 50 MG: 10 INJECTION, EMULSION INTRAVENOUS at 10:31

## 2025-06-17 RX ADMIN — PROPOFOL 50 MG: 10 INJECTION, EMULSION INTRAVENOUS at 10:21

## 2025-06-17 RX ADMIN — SODIUM CHLORIDE: 0.9 INJECTION, SOLUTION INTRAVENOUS at 10:13

## 2025-06-17 RX ADMIN — LIDOCAINE HYDROCHLORIDE 50 MG: 10 INJECTION, SOLUTION EPIDURAL; INFILTRATION; INTRACAUDAL; PERINEURAL at 10:17

## 2025-06-17 NOTE — H&P
History and Physical   Colon and Rectal Surgery   Gary G Moritz 64 y.o. male MRN: [id number]  Unit/Bed#:  Encounter: [id number]  06/17/25   @[facility]    No chief complaint on file.        History of Present Illness   HPI:  Gary G Moritz is a 64 y.o. male who presents for surveillance of personal history of colon polyps.      Historical Information   Past Medical History[1]  Past Surgical History[2]    Meds/Allergies     Not in a hospital admission.    Current Medications[3]    Allergies[4]      Social History   Social History     Substance and Sexual Activity   Alcohol Use Yes    Alcohol/week: 3.0 standard drinks of alcohol    Types: 3 Cans of beer per week     Social History     Substance and Sexual Activity   Drug Use Not Currently    Types: Marijuana     Tobacco Use History[5]      Family History: Family History[6]      Objective     Current Vitals:      No intake or output data in the 24 hours ending 06/17/25 0923    Physical Exam:  General:  Resting comfortably in bed   Eyes:Sclera anicteric  ENT: Trachea midline  Pulm:  Symmetric chest raise.  No respiratory Distress  CV:  Regular on monitor  Abdomen:  Soft NT ND  Extremities:  No clubbing/ cyanosis/ edema    Lab Results: I have personally reviewed pertinent lab results.    Imaging: Results Review Statement: No pertinent imaging studies reviewed.      ASSESSMENT:  Gary G Moritz is a 64 y.o. male who presents for outpatient colonoscopy.      PLAN:  For colonoscopy    Risks/ Benefits reviewed to include but not limited to anesthesia, bleeding, missed lesions, and colonoscopic perforation requiring surgery.           [1]   Past Medical History:  Diagnosis Date    Acute URI 12/13/2023    Anxiety     Arthritis     Bilateral leg edema 04/01/2024    Chronic GERD     Hypertension     Peripheral neuropathy    [2]   Past Surgical History:  Procedure Laterality Date    BACK SURGERY      lumbar 4-5    ELBOW SURGERY Right     Removal chips    JOINT REPLACEMENT Bilateral     Hip     LAMINECTOMY  2014    DE ARTHRP ACETBLR/PROX FEM PROSTC AGRFT/ALGRFT Right 01/12/2016    Procedure: ARTHROPLASTY HIP TOTAL ANTERIOR;  Surgeon: Malik Meyers DO;  Location: [facility];  Service: Orthopedics    DE ARTHRP KNE CONDYLE&PLATU MEDIAL&LAT COMPARTMENTS Left 12/12/2016    Procedure: ARTHROPLASTY KNEE TOTAL;  Surgeon: Antoine Pena MD;  Location: [facility];  Service: Orthopedics    DE ARTHRP KNE CONDYLE&PLATU MEDIAL&LAT COMPARTMENTS Right 06/01/2020    Procedure: ARTHROPLASTY KNEE TOTAL;  Surgeon: Antoine Pena MD;  Location: [facility];  Service: Orthopedics    DE RINSJ RPTD BICEPS/TRICEPS TDN DSTL W/WO TDN GRF Left 12/04/2019    Procedure: DISTAL BICEPS TENDON REPAIR;  Surgeon: Chauncey Perea MD;  Location: [facility];  Service: Orthopedics   [3]   Current Outpatient Medications:     allopurinol (ZYLOPRIM) 100 mg tablet, Take 1 tablet (100 mg total) by mouth in the morning., Disp: 90 tablet, Rfl: 1    ALPRAZolam (XANAX) 0.25 mg tablet, Take 1 tablet (0.25 mg total) by mouth 3 (three) times a day as needed for anxiety or sleep, Disp: 90 tablet, Rfl: 0    b complex vitamins capsule, Take 1 capsule by mouth in the morning., Disp: , Rfl:     cholecalciferol (VITAMIN D3) 1,000 units tablet, Take 1,000 Units by mouth in the morning., Disp: , Rfl:     clotrimazole-betamethasone (LOTRISONE) 1-0.05 % cream, , Disp: , Rfl:     gabapentin (NEURONTIN) 300 mg capsule, Take 1 capsule (300 mg total) by mouth daily at bedtime, Disp: 90 capsule, Rfl: 1    losartan (COZAAR) 50 mg tablet, Take 1 tablet (50 mg total) by mouth daily, Disp: 90 tablet, Rfl: 1    Multiple Vitamin (MULTIVITAMIN) capsule, Take 1 capsule by mouth in the morning., Disp: , Rfl:     Omega-3 Fatty Acids (FISH OIL) 1200 MG CAPS, Take 1 capsule by mouth in the morning., Disp: , Rfl:     omeprazole (PriLOSEC) 20 mg delayed release capsule, take 1 capsule by mouth once daily, Disp: 90 capsule, Rfl: 1    rosuvastatin (CRESTOR) 10 MG tablet, TAKE  1 TABLET BY MOUTH EVERY DAY, Disp: 90 tablet, Rfl: 2    [START ON 2025] tirzepatide (Zepbound) 10 mg/0.5 mL auto-injector, Inject 0.5 mL (10 mg total) under the skin once a week Do not start before 2025., Disp: 2 mL, Rfl: 0    tirzepatide (Zepbound) 2.5 mg/0.5 mL auto-injector, Inject 0.5 mL (2.5 mg total) under the skin once a week for 28 days, Disp: 2 mL, Rfl: 0    [START ON 2025] tirzepatide (Zepbound) 5 mg/0.5 mL auto-injector, Inject 0.5 mL (5 mg total) under the skin once a week Do not start before 2025., Disp: 2 mL, Rfl: 0    [START ON 2025] tirzepatide (Zepbound) 7.5 mg/0.5 mL auto-injector, Inject 0.5 mL (7.5 mg total) under the skin once a week Do not start before 2025., Disp: 2 mL, Rfl: 0  [4]   Allergies  Allergen Reactions    Oxycodone Itching   [5]   Social History  Tobacco Use   Smoking Status Former    Current packs/day: 0.00    Types: Cigarettes    Quit date: 2020    Years since quittin.0   Smokeless Tobacco Never   Tobacco Comments    social smoker   [6]   Family History  Problem Relation Name Age of Onset    Hypertension Mother

## 2025-06-17 NOTE — ANESTHESIA PREPROCEDURE EVALUATION
Procedure:  COLONOSCOPY    Relevant Problems   CARDIO   (+) Primary hypertension      GI/HEPATIC   (+) GERD (gastroesophageal reflux disease)      NEURO/PSYCH   (+) Anxiety      Denies recent fever, cough or other symptom of upper respiratory tract infection.    Confirmed NPO appropriate    [place] 2b with Mac 3 on 1/12/16    Physical Exam    Airway     Mallampati score: IV  TM Distance: >3 FB  Neck ROM: full  Mouth opening: >= 4 cm      Cardiovascular      Dental   No notable dental hx     Pulmonary      Neurological    He appears awake, alert and oriented x3.      Other Findings        Anesthesia Plan  ASA Score- 2     Anesthesia Type- IV sedation with anesthesia with ASA Monitors.         Additional Monitors:     Airway Plan: natural airway.           Plan Factors-    Chart reviewed.   Existing labs reviewed.     Patient is not a current smoker (Quit 3-4 years ago).  Patient did not smoke on day of surgery.            Induction- intravenous.    Postoperative Plan- .   Monitoring Plan - Monitoring plan - standard ASA monitoring  Post Operative Pain Plan - non-opiod analgesics        Informed Consent- Anesthetic plan and risks discussed with patient.        NPO Status:  Vitals Value Taken Time   Date of last liquid 06/17/25 06/17/25 09:48   Time of last liquid 0400 06/17/25 09:48   Date of last solid 06/15/25 06/17/25 09:48   Time of last solid 1800 06/17/25 09:48

## 2025-06-18 ENCOUNTER — RESULTS FOLLOW-UP (OUTPATIENT)
Dept: OTHER | Facility: HOSPITAL | Age: 65
End: 2025-06-18

## 2025-06-18 PROCEDURE — 88305 TISSUE EXAM BY PATHOLOGIST: CPT | Performed by: STUDENT IN AN ORGANIZED HEALTH CARE EDUCATION/TRAINING PROGRAM

## 2025-07-10 DIAGNOSIS — E66.811 CLASS 1 OBESITY WITHOUT SERIOUS COMORBIDITY WITH BODY MASS INDEX (BMI) OF 30.0 TO 30.9 IN ADULT, UNSPECIFIED OBESITY TYPE: ICD-10-CM

## 2025-07-11 RX ORDER — TIRZEPATIDE 2.5 MG/.5ML
2.5 INJECTION, SOLUTION SUBCUTANEOUS WEEKLY
Qty: 2 ML | Refills: 0 | OUTPATIENT
Start: 2025-07-11 | End: 2025-08-08

## 2025-07-11 NOTE — TELEPHONE ENCOUNTER
Every successive dose is at the pharmacy.  The next month's dose is at the pharmacy.  Already ordered.

## 2025-07-14 DIAGNOSIS — F41.9 ANXIETY: ICD-10-CM

## 2025-07-14 RX ORDER — ALPRAZOLAM 0.25 MG
0.25 TABLET ORAL 3 TIMES DAILY PRN
Qty: 90 TABLET | Refills: 0 | Status: SHIPPED | OUTPATIENT
Start: 2025-07-14

## (undated) DEVICE — TRAY FOLEY 16FR URIMETER SURESTEP

## (undated) DEVICE — CASSETTE DRAPE: Brand: UNBRANDED

## (undated) DEVICE — SCD SEQUENTIAL COMPRESSION COMFORT SLEEVE MEDIUM KNEE LENGTH: Brand: KENDALL SCD

## (undated) DEVICE — 3M™ STERI-STRIP™ REINFORCED ADHESIVE SKIN CLOSURES, R1547, 1/2 IN X 4 IN (12 MM X 100 MM), 6 STRIPS/ENVELOPE: Brand: 3M™ STERI-STRIP™

## (undated) DEVICE — COOL TEMP PAD

## (undated) DEVICE — SILVER-COATED ANTIMICROBIAL BARRIER DRESSING: Brand: ACTICOAT   4" X 8"

## (undated) DEVICE — DRAPE C-ARM X-RAY

## (undated) DEVICE — RECIPROCATING BLADE, DOUBLE SIDED (70.0 X 0.96 X 12.5MM)

## (undated) DEVICE — ANTIBACTERIAL UNDYED BRAIDED (POLYGLACTIN 910), SYNTHETIC ABSORBABLE SUTURE: Brand: COATED VICRYL

## (undated) DEVICE — STOCKINETTE REGULAR

## (undated) DEVICE — DRAPE SHEET X-LG

## (undated) DEVICE — PADDING CAST 4 IN  COTTON STRL

## (undated) DEVICE — CHLORAPREP HI-LITE 26ML ORANGE

## (undated) DEVICE — DUAL CUT SAGITTAL BLADE

## (undated) DEVICE — CAPIT KNEE ATTUNE FB CEMENT - DEPUY

## (undated) DEVICE — CUFF TOURNIQUET 18 X 4 IN QUICK CONNECT DISP 1 BLADDER

## (undated) DEVICE — PLUMEPEN PRO 10FT

## (undated) DEVICE — NO-SCRATCH ™ SMALL WHITNEY CURETTE ™ IS A SINGLE-USE, PLASTIC CURETTE FOR QUICKLY APPLYING, MANIPULATING AND REMOVING BONE CEMENT DURING HIP AND KNEE REPLACEMENT SURGERY. THE PLASTIC IS SOFTER THAN STEEL INSTRUMENTS, REDUCING THE RISK OF DAMAGING THE PROSTHESIS WITH METAL INSTRUMENTS.  THE CURETTE’S 6MM TIP REMOVES EXCESS CEMENT FROM REPLACEMENT HIPS AND KNEES. EASY-TO-MANEUVER, THE SMALL BLUE CURETTE LETS YOU REMOVE CEMENT FROM ALL EDGES OF THE PROSTHESIS.NO-SCRATCH WHITNEY SMALL CURETTE FEATURES:SAFER THAN STEEL- MADE OF PLASTIC - STURDY YET SOFTER THAN SURGICAL STEEL.HANDIER- EACH TOOL HAS A MOLDED-IN THUMB INDENTATION INSTANTLY ORIENTING THE TOOL.- EASIER TO MANEUVER IN HARD TO SEE PLACES.- COLOR-CODED FOR EASY IDENTIFICATION.FASTER- COMES INDIVIDUALLY PACKAGED IN STERILE, PEEL OPEN POUCH, READY TO GO.- APPLIES, MANIPULATES, OR REMOVES CEMENT WITH FINGERTIP PRECISION.ECONOMICAL- THE COST OF A SINGLE REVISION DWARFS THE COST OF A SINGLE-USE CURETTE. - DISPOSABLE – THERE’S NO NEED TO WASTE TIME REMOVING HARDENED CEMENT OR RE-STERILIZING TOOLS.- LESS EXPENSIVE TO BUY AND INVENTORY - ORDER ONLY THE TOOL YOU USE.- PACKAGED 25 INDIVIDUALLY WRAPPED TOOLS TO A CARTON FOR CONVENIENT SHELF STORAGE.: Brand: WHITNEY NO-SCRATCH CURETTE (SMALL)

## (undated) DEVICE — BULB SYRINGE,IRRIGATION WITH PROTECTIVE CAP: Brand: DOVER

## (undated) DEVICE — TUBING SUCTION 5MM X 12 FT

## (undated) DEVICE — CUFF TOURNIQUET 30 X 4 IN QUICK CONNECT DISP 1BLA

## (undated) DEVICE — JP 3-SPRING RES W/10FR PVC DRAIN/TR: Brand: CARDINAL HEALTH

## (undated) DEVICE — IMPERVIOUS STOCKINETTE: Brand: DEROYAL

## (undated) DEVICE — INTENDED FOR TISSUE SEPARATION, AND OTHER PROCEDURES THAT REQUIRE A SHARP SURGICAL BLADE TO PUNCTURE OR CUT.: Brand: BARD-PARKER SAFETY BLADES SIZE 10, STERILE

## (undated) DEVICE — SPINNING CEMENT MIXING BOWL

## (undated) DEVICE — LIGHT HANDLE COVER SLEEVE DISP BLUE STELLAR

## (undated) DEVICE — ACE WRAP 6 IN STERILE

## (undated) DEVICE — GLOVE SRG BIOGEL 7.5

## (undated) DEVICE — GAUZE SPONGES,16 PLY: Brand: CURITY

## (undated) DEVICE — ACE WRAP 6 IN UNSTERILE

## (undated) DEVICE — SUT VICRYL PLUS 1 CTB-1 36 IN VCPB947H

## (undated) DEVICE — DRAPE EQUIPMENT RF WAND

## (undated) DEVICE — DRESSING EXUDERM SATIN HYDROCOLLOID 4 X 4 IN

## (undated) DEVICE — SUT VICRYL PLUS 2-0 CTB-1 27 IN VCPB259H

## (undated) DEVICE — STERILE ALLENTOWN HAND PACK: Brand: CARDINAL HEALTH

## (undated) DEVICE — GLOVE INDICATOR PI UNDERGLOVE SZ 8.5 BLUE

## (undated) DEVICE — SUT MONOCRYL 4-0 PS-2 27 IN Y426H

## (undated) DEVICE — INTENDED FOR TISSUE SEPARATION, AND OTHER PROCEDURES THAT REQUIRE A SHARP SURGICAL BLADE TO PUNCTURE OR CUT.: Brand: BARD-PARKER SAFETY BLADES SIZE 15, STERILE

## (undated) DEVICE — GLOVE SRG BIOGEL 8

## (undated) DEVICE — OCCLUSIVE GAUZE STRIP,3% BISMUTH TRIBROMOPHENATE IN PETROLATUM BLEND: Brand: XEROFORM

## (undated) DEVICE — THE SIMPULSE SOLO SYSTEM WITH ULTREX RETRACTABLE SPLASH SHIELD TIP: Brand: SIMPULSE SOLO

## (undated) DEVICE — GLOVE INDICATOR PI UNDERGLOVE SZ 7.5 BLUE

## (undated) DEVICE — ACE WRAP 4 IN UNSTERILE

## (undated) DEVICE — SPONGE PVP SCRUB WING STERILE

## (undated) DEVICE — BETHLEHEM UNIV TOTAL KNEE, KIT: Brand: CARDINAL HEALTH

## (undated) DEVICE — SPONGE GAUZE 4 X 9

## (undated) DEVICE — ABDOMINAL PAD: Brand: DERMACEA

## (undated) DEVICE — HOOD: Brand: FLYTE, SURGICOOL

## (undated) DEVICE — 3M™ IOBAN™ 2 ANTIMICROBIAL INCISE DRAPE 6650EZ: Brand: IOBAN™ 2

## (undated) DEVICE — COBAN 4 IN STERILE